# Patient Record
Sex: FEMALE | Race: OTHER | Employment: OTHER | URBAN - METROPOLITAN AREA
[De-identification: names, ages, dates, MRNs, and addresses within clinical notes are randomized per-mention and may not be internally consistent; named-entity substitution may affect disease eponyms.]

---

## 2021-07-21 ENCOUNTER — OFFICE VISIT (OUTPATIENT)
Dept: FAMILY MEDICINE CLINIC | Facility: CLINIC | Age: 51
End: 2021-07-21
Payer: COMMERCIAL

## 2021-07-21 VITALS
WEIGHT: 182.19 LBS | OXYGEN SATURATION: 97 % | DIASTOLIC BLOOD PRESSURE: 82 MMHG | HEART RATE: 76 BPM | HEIGHT: 61 IN | BODY MASS INDEX: 34.4 KG/M2 | SYSTOLIC BLOOD PRESSURE: 118 MMHG

## 2021-07-21 DIAGNOSIS — R73.09 ELEVATED GLUCOSE: ICD-10-CM

## 2021-07-21 DIAGNOSIS — Z11.59 NEED FOR HEPATITIS C SCREENING TEST: ICD-10-CM

## 2021-07-21 DIAGNOSIS — Z12.31 SCREENING MAMMOGRAM, ENCOUNTER FOR: ICD-10-CM

## 2021-07-21 DIAGNOSIS — R63.5 WEIGHT GAIN: ICD-10-CM

## 2021-07-21 DIAGNOSIS — Z78.0 POSTMENOPAUSAL: ICD-10-CM

## 2021-07-21 DIAGNOSIS — Z91.89 AT RISK FOR OSTEOPOROSIS: ICD-10-CM

## 2021-07-21 DIAGNOSIS — Z00.00 PREVENTATIVE HEALTH CARE: Primary | ICD-10-CM

## 2021-07-21 DIAGNOSIS — M54.16 LUMBAR RADICULOPATHY: ICD-10-CM

## 2021-07-21 DIAGNOSIS — E78.5 HYPERLIPIDEMIA, UNSPECIFIED HYPERLIPIDEMIA TYPE: ICD-10-CM

## 2021-07-21 PROCEDURE — 99205 OFFICE O/P NEW HI 60 MIN: CPT | Performed by: INTERNAL MEDICINE

## 2021-07-21 PROCEDURE — 3008F BODY MASS INDEX DOCD: CPT | Performed by: INTERNAL MEDICINE

## 2021-07-21 PROCEDURE — 3079F DIAST BP 80-89 MM HG: CPT | Performed by: INTERNAL MEDICINE

## 2021-07-21 PROCEDURE — 3074F SYST BP LT 130 MM HG: CPT | Performed by: INTERNAL MEDICINE

## 2021-07-21 RX ORDER — OMEPRAZOLE 20 MG/1
20 CAPSULE, DELAYED RELEASE ORAL DAILY
COMMUNITY
End: 2021-08-09

## 2021-07-21 RX ORDER — GEMFIBROZIL 600 MG/1
TABLET, FILM COATED ORAL
COMMUNITY
Start: 2021-03-17 | End: 2021-07-31

## 2021-07-21 RX ORDER — PSEUDOEPHED/ACETAMINOPH/DIPHEN 30MG-500MG
TABLET ORAL
COMMUNITY
Start: 2021-04-14 | End: 2021-09-21 | Stop reason: ALTCHOICE

## 2021-07-21 NOTE — PROGRESS NOTES
[facility] 8  New Patient History and Physical      HPI:   Patient presents with:  Leg Pain: Left leg pain since [address] is a 52year old female presenting for:  Establish care.    Has  has a pa taking: Reported on 7/21/2021)     • ACETAMINOPHEN EXTRA STRENGTH 500 MG Oral Tab DOS TABLETAS 2 VECES AL ALBA PARA DOLOR Y FIBRE (Patient not taking: Reported on 7/21/2021)        PMH:  Past Medical History:   Diagnosis Date   • Esophageal reflux    • High /82 (BP Location: Right arm, Patient Position: Sitting, Cuff Size: adult)   Pulse 76   Ht 5' 1\" (1.549 m)   Wt 182 lb 3.2 oz (82.6 kg)   SpO2 97%   BMI 34.43 kg/m²  Estimated body mass index is 34.43 kg/m² as calculated from the following:    Dalila Thought content normal.         Judgment: Judgment normal.             ASSESSMENT AND PLAN:   Patient is a 52year old female who presents primarily presents for:    (Z00.00) Preventative health care  (primary encounter diagnosis)  Plan: TSH+FREE T4, CBC W the above recommendations and agrees to the above plan. Reasurrance and education provided. All questions answered.   Notified to call with any questions, complications, allergies, or worsening or changing symptoms as well as any side effects or complicati

## 2021-07-22 ENCOUNTER — LAB ENCOUNTER (OUTPATIENT)
Dept: LAB | Facility: HOSPITAL | Age: 51
End: 2021-07-22
Attending: INTERNAL MEDICINE
Payer: COMMERCIAL

## 2021-07-22 DIAGNOSIS — Z91.89 AT RISK FOR OSTEOPOROSIS: ICD-10-CM

## 2021-07-22 DIAGNOSIS — R63.5 WEIGHT GAIN: ICD-10-CM

## 2021-07-22 DIAGNOSIS — Z78.0 POSTMENOPAUSAL: ICD-10-CM

## 2021-07-22 DIAGNOSIS — Z00.00 PREVENTATIVE HEALTH CARE: ICD-10-CM

## 2021-07-22 LAB
ALBUMIN SERPL-MCNC: 3.4 G/DL (ref 3.4–5)
ALBUMIN/GLOB SERPL: 0.9 {RATIO} (ref 1–2)
ALP LIVER SERPL-CCNC: 117 U/L
ALT SERPL-CCNC: 27 U/L
ANION GAP SERPL CALC-SCNC: 7 MMOL/L (ref 0–18)
AST SERPL-CCNC: 16 U/L (ref 15–37)
BASOPHILS # BLD AUTO: 0.05 X10(3) UL (ref 0–0.2)
BASOPHILS NFR BLD AUTO: 0.5 %
BILIRUB SERPL-MCNC: 0.4 MG/DL (ref 0.1–2)
BUN BLD-MCNC: 13 MG/DL (ref 7–18)
BUN/CREAT SERPL: 20.3 (ref 10–20)
CALCIUM BLD-MCNC: 8.4 MG/DL (ref 8.5–10.1)
CHLORIDE SERPL-SCNC: 108 MMOL/L (ref 98–112)
CHOLEST SMN-MCNC: 146 MG/DL (ref ?–200)
CO2 SERPL-SCNC: 26 MMOL/L (ref 21–32)
CREAT BLD-MCNC: 0.64 MG/DL
DEPRECATED RDW RBC AUTO: 39.4 FL (ref 35.1–46.3)
EOSINOPHIL # BLD AUTO: 0.12 X10(3) UL (ref 0–0.7)
EOSINOPHIL NFR BLD AUTO: 1.2 %
ERYTHROCYTE [DISTWIDTH] IN BLOOD BY AUTOMATED COUNT: 12.3 % (ref 11–15)
EST. AVERAGE GLUCOSE BLD GHB EST-MCNC: 105 MG/DL (ref 68–126)
GLOBULIN PLAS-MCNC: 3.8 G/DL (ref 2.8–4.4)
GLUCOSE BLD-MCNC: 83 MG/DL (ref 70–99)
HBA1C MFR BLD HPLC: 5.3 % (ref ?–5.7)
HCT VFR BLD AUTO: 39.8 %
HCV AB SERPL QL IA: NONREACTIVE
HDLC SERPL-MCNC: 34 MG/DL (ref 40–59)
HGB BLD-MCNC: 13.1 G/DL
IMM GRANULOCYTES # BLD AUTO: 0.06 X10(3) UL (ref 0–1)
IMM GRANULOCYTES NFR BLD: 0.6 %
LDLC SERPL CALC-MCNC: 55 MG/DL (ref ?–100)
LYMPHOCYTES # BLD AUTO: 2.38 X10(3) UL (ref 1–4)
LYMPHOCYTES NFR BLD AUTO: 23.4 %
M PROTEIN MFR SERPL ELPH: 7.2 G/DL (ref 6.4–8.2)
MCH RBC QN AUTO: 28.8 PG (ref 26–34)
MCHC RBC AUTO-ENTMCNC: 32.9 G/DL (ref 31–37)
MCV RBC AUTO: 87.5 FL
MONOCYTES # BLD AUTO: 0.57 X10(3) UL (ref 0.1–1)
MONOCYTES NFR BLD AUTO: 5.6 %
NEUTROPHILS # BLD AUTO: 7.01 X10 (3) UL (ref 1.5–7.7)
NEUTROPHILS # BLD AUTO: 7.01 X10(3) UL (ref 1.5–7.7)
NEUTROPHILS NFR BLD AUTO: 68.7 %
NONHDLC SERPL-MCNC: 112 MG/DL (ref ?–130)
OSMOLALITY SERPL CALC.SUM OF ELEC: 291 MOSM/KG (ref 275–295)
PATIENT FASTING Y/N/NP: YES
PATIENT FASTING Y/N/NP: YES
PLATELET # BLD AUTO: 429 10(3)UL (ref 150–450)
POTASSIUM SERPL-SCNC: 3.7 MMOL/L (ref 3.5–5.1)
RBC # BLD AUTO: 4.55 X10(6)UL
SODIUM SERPL-SCNC: 141 MMOL/L (ref 136–145)
T4 FREE SERPL-MCNC: 1 NG/DL (ref 0.8–1.7)
TRIGL SERPL-MCNC: 376 MG/DL (ref 30–149)
TSI SER-ACNC: 1.31 MIU/ML (ref 0.36–3.74)
VLDLC SERPL CALC-MCNC: 55 MG/DL (ref 0–30)
WBC # BLD AUTO: 10.2 X10(3) UL (ref 4–11)

## 2021-07-22 PROCEDURE — 82306 VITAMIN D 25 HYDROXY: CPT

## 2021-07-22 PROCEDURE — 86803 HEPATITIS C AB TEST: CPT | Performed by: INTERNAL MEDICINE

## 2021-07-22 PROCEDURE — 84443 ASSAY THYROID STIM HORMONE: CPT

## 2021-07-22 PROCEDURE — 80053 COMPREHEN METABOLIC PANEL: CPT

## 2021-07-22 PROCEDURE — 80061 LIPID PANEL: CPT | Performed by: INTERNAL MEDICINE

## 2021-07-22 PROCEDURE — 36415 COLL VENOUS BLD VENIPUNCTURE: CPT | Performed by: INTERNAL MEDICINE

## 2021-07-22 PROCEDURE — 83036 HEMOGLOBIN GLYCOSYLATED A1C: CPT | Performed by: INTERNAL MEDICINE

## 2021-07-22 PROCEDURE — 85025 COMPLETE CBC W/AUTO DIFF WBC: CPT | Performed by: INTERNAL MEDICINE

## 2021-07-22 PROCEDURE — 84439 ASSAY OF FREE THYROXINE: CPT

## 2021-07-23 LAB — 25(OH)D3 SERPL-MCNC: 19.9 NG/ML (ref 30–100)

## 2021-08-09 ENCOUNTER — OFFICE VISIT (OUTPATIENT)
Dept: FAMILY MEDICINE CLINIC | Facility: CLINIC | Age: 51
End: 2021-08-09
Payer: COMMERCIAL

## 2021-08-09 VITALS
DIASTOLIC BLOOD PRESSURE: 72 MMHG | BODY MASS INDEX: 34 KG/M2 | SYSTOLIC BLOOD PRESSURE: 118 MMHG | HEART RATE: 79 BPM | TEMPERATURE: 97 F | WEIGHT: 182 LBS | OXYGEN SATURATION: 97 %

## 2021-08-09 DIAGNOSIS — M79.89 LIMB SWELLING: ICD-10-CM

## 2021-08-09 DIAGNOSIS — R51.9 NONINTRACTABLE HEADACHE, UNSPECIFIED CHRONICITY PATTERN, UNSPECIFIED HEADACHE TYPE: Primary | ICD-10-CM

## 2021-08-09 PROCEDURE — 99214 OFFICE O/P EST MOD 30 MIN: CPT | Performed by: INTERNAL MEDICINE

## 2021-08-09 PROCEDURE — 3078F DIAST BP <80 MM HG: CPT | Performed by: INTERNAL MEDICINE

## 2021-08-09 PROCEDURE — 3074F SYST BP LT 130 MM HG: CPT | Performed by: INTERNAL MEDICINE

## 2021-08-09 RX ORDER — CYCLOBENZAPRINE HCL 10 MG
10 TABLET ORAL NIGHTLY PRN
Qty: 90 TABLET | Refills: 0 | Status: SHIPPED | OUTPATIENT
Start: 2021-08-09 | End: 2021-10-01

## 2021-08-09 RX ORDER — PANTOPRAZOLE SODIUM 20 MG/1
20 TABLET, DELAYED RELEASE ORAL
COMMUNITY
End: 2021-08-09

## 2021-08-09 RX ORDER — ROSUVASTATIN CALCIUM 40 MG/1
40 TABLET, COATED ORAL NIGHTLY
Qty: 90 TABLET | Refills: 0 | Status: SHIPPED | OUTPATIENT
Start: 2021-08-09 | End: 2021-11-09

## 2021-08-09 RX ORDER — MELATONIN
1000 DAILY
COMMUNITY

## 2021-08-09 RX ORDER — IBUPROFEN 200 MG
200 TABLET ORAL EVERY 6 HOURS PRN
COMMUNITY
End: 2021-09-21 | Stop reason: ALTCHOICE

## 2021-08-09 RX ORDER — PANTOPRAZOLE SODIUM 20 MG/1
20 TABLET, DELAYED RELEASE ORAL
Qty: 90 TABLET | Refills: 0 | Status: SHIPPED | OUTPATIENT
Start: 2021-08-09 | End: 2021-09-21

## 2021-08-09 RX ORDER — NAPROXEN 500 MG/1
500 TABLET ORAL 2 TIMES DAILY WITH MEALS
Qty: 60 TABLET | Refills: 0 | Status: SHIPPED | OUTPATIENT
Start: 2021-08-09 | End: 2021-10-01

## 2021-08-09 RX ORDER — ATORVASTATIN CALCIUM 10 MG/1
10 TABLET, FILM COATED ORAL NIGHTLY
COMMUNITY
End: 2021-08-09

## 2021-08-10 NOTE — PROGRESS NOTES
[address]  Return Patient      HPI:   Patient presents with:  Headache      August Prim is a 52year old female presenting for:  Follow up  Has  has a past medical history of Esophageal reflux, High cholesterol, and Lumba twitching. She reports that unable to tolerate many medications including NSAIDs and  Pain meds due to nausea and vomiting. Requesting a weight loss medication.       HLD: Was placed on statin and gemfibrozile combination pill while in [facility].    Total Hy every morning before breakfast. 90 tablet 0   • rosuvastatin (CRESTOR) 40 MG Oral Tab Take 1 tablet (40 mg total) by mouth nightly. 90 tablet 0   • cyclobenzaprine 10 MG Oral Tab Take 1 tablet (10 mg total) by mouth nightly as needed.  90 tablet 0   • napro asymmetry, speech difficulty, weakness, light-headedness, numbness and headaches. Hematological: Negative for adenopathy. Does not bruise/bleed easily. Psychiatric/Behavioral: Negative for behavioral problems, sleep disturbance and suicidal ideas.  The General: Skin is warm. Findings: No erythema or rash. Neurological:      Mental Status: She is alert and oriented to person, place, and time. Cranial Nerves: No cranial nerve deficit.       Deep Tendon Reflexes: Reflexes are normal and symmetric any questions, complications, allergies, or worsening or changing symptoms as well as any side effects or complications from the treatments . Red flags/ ER precautions discussed.     Total time spent was 30 minutes which includes: Preparation to see patizaki

## 2021-08-14 ENCOUNTER — HOSPITAL ENCOUNTER (OUTPATIENT)
Dept: MAMMOGRAPHY | Facility: HOSPITAL | Age: 51
Discharge: HOME | End: 2021-08-14
Attending: INTERNAL MEDICINE
Payer: COMMERCIAL

## 2021-08-14 DIAGNOSIS — Z12.31 SCREENING MAMMOGRAM, ENCOUNTER FOR: ICD-10-CM

## 2021-08-14 PROCEDURE — 77067 SCR MAMMO BI INCL CAD: CPT | Performed by: INTERNAL MEDICINE

## 2021-08-14 PROCEDURE — 77063 BREAST TOMOSYNTHESIS BI: CPT | Performed by: INTERNAL MEDICINE

## 2021-08-18 ENCOUNTER — TELEPHONE (OUTPATIENT)
Dept: FAMILY MEDICINE CLINIC | Facility: CLINIC | Age: 51
End: 2021-08-18

## 2021-08-18 NOTE — TELEPHONE ENCOUNTER
Results were discussed with patient and she verbalized understanding.      ----- Message from Yu Monreal MD sent at 8/17/2021 11:24 AM CDT -----  Please ensure that patient knows of mammogram finding. Benign finding. Yearly recommended.

## 2021-09-14 ENCOUNTER — TELEPHONE (OUTPATIENT)
Dept: FAMILY MEDICINE CLINIC | Facility: CLINIC | Age: 51
End: 2021-09-14

## 2021-09-14 NOTE — TELEPHONE ENCOUNTER
Results were discussed with patient ans she was already aware of them because she received the letter from the mammography dept.      ----- Message from Sara Fothergill, MD sent at 9/2/2021  3:54 PM CDT -----  Inform of addendum and benign comparison.   Ro

## 2021-09-21 ENCOUNTER — OFFICE VISIT (OUTPATIENT)
Dept: FAMILY MEDICINE CLINIC | Facility: CLINIC | Age: 51
End: 2021-09-21
Payer: COMMERCIAL

## 2021-09-21 VITALS
DIASTOLIC BLOOD PRESSURE: 70 MMHG | OXYGEN SATURATION: 97 % | WEIGHT: 179 LBS | TEMPERATURE: 98 F | HEART RATE: 76 BPM | SYSTOLIC BLOOD PRESSURE: 104 MMHG | BODY MASS INDEX: 34 KG/M2

## 2021-09-21 DIAGNOSIS — E78.1 HYPERTRIGLYCERIDEMIA: ICD-10-CM

## 2021-09-21 DIAGNOSIS — M25.50 ARTHRALGIA, UNSPECIFIED JOINT: Primary | ICD-10-CM

## 2021-09-21 PROCEDURE — 3078F DIAST BP <80 MM HG: CPT | Performed by: INTERNAL MEDICINE

## 2021-09-21 PROCEDURE — 3074F SYST BP LT 130 MM HG: CPT | Performed by: INTERNAL MEDICINE

## 2021-09-21 PROCEDURE — 99214 OFFICE O/P EST MOD 30 MIN: CPT | Performed by: INTERNAL MEDICINE

## 2021-09-21 RX ORDER — PANTOPRAZOLE SODIUM 20 MG/1
20 TABLET, DELAYED RELEASE ORAL
Qty: 90 TABLET | Refills: 0 | Status: SHIPPED | OUTPATIENT
Start: 2021-09-21 | End: 2021-11-09

## 2021-09-23 ENCOUNTER — LAB ENCOUNTER (OUTPATIENT)
Dept: LAB | Facility: HOSPITAL | Age: 51
End: 2021-09-23
Attending: INTERNAL MEDICINE
Payer: COMMERCIAL

## 2021-09-23 DIAGNOSIS — E78.1 HYPERTRIGLYCERIDEMIA: ICD-10-CM

## 2021-09-23 LAB
CHOLEST SERPL-MCNC: 100 MG/DL (ref ?–200)
HDLC SERPL-MCNC: 35 MG/DL (ref 40–59)
LDLC SERPL CALC-MCNC: 34 MG/DL (ref ?–100)
NONHDLC SERPL-MCNC: 65 MG/DL (ref ?–130)
PATIENT FASTING Y/N/NP: YES
TRIGL SERPL-MCNC: 194 MG/DL (ref 30–149)
VLDLC SERPL CALC-MCNC: 26 MG/DL (ref 0–30)

## 2021-09-23 PROCEDURE — 80061 LIPID PANEL: CPT

## 2021-09-23 PROCEDURE — 36415 COLL VENOUS BLD VENIPUNCTURE: CPT

## 2021-09-24 NOTE — PROGRESS NOTES
[address]  Return Patient      HPI:   Patient presents with:  Joint Pain      Lori Shaffer is a 54year old female presenting for:  Follow up  Has  has a past medical history of Esophageal reflux, High cholesterol, and Lum muscle twitching. She reports that unable to tolerate many medications including NSAIDs and  Pain meds due to nausea and vomiting. Requesting a weight loss medication.       HLD: Was placed on statin and gemfibrozile combination pill while in [facility].    T Take 1 tablet (10 mg total) by mouth nightly as needed.  (Patient not taking: Reported on 9/21/2021) 90 tablet 0      PMH:  Past Medical History:   Diagnosis Date   • Esophageal reflux    • High cholesterol    • Lumbar spondylosis          PSH:  Past Surgic PHYSICAL EXAM:   /70 (BP Location: Right arm, Patient Position: Sitting, Cuff Size: adult)   Pulse 76   Temp 97.5 °F (36.4 °C) (Temporal)   Wt 179 lb (81.2 kg)   SpO2 97%   BMI 33.82 kg/m²  Estimated body mass index is 33.82 kg/m² as calculated fro Psychiatric:         Behavior: Behavior normal.         Thought Content:  Thought content normal.         Judgment: Judgment normal.             ASSESSMENT AND PLAN:   Patient is a 54year old female who presents primarily presents for:    (M25.50) Pretty [place]

## 2021-10-01 ENCOUNTER — HOSPITAL ENCOUNTER (OUTPATIENT)
Dept: GENERAL RADIOLOGY | Facility: HOSPITAL | Age: 51
Discharge: HOME | End: 2021-10-01
Attending: PHYSICAL MEDICINE & REHABILITATION
Payer: COMMERCIAL

## 2021-10-01 ENCOUNTER — OFFICE VISIT (OUTPATIENT)
Dept: PHYSICAL MEDICINE AND REHAB | Facility: CLINIC | Age: 51
End: 2021-10-01
Payer: COMMERCIAL

## 2021-10-01 VITALS
SYSTOLIC BLOOD PRESSURE: 116 MMHG | BODY MASS INDEX: 31.53 KG/M2 | OXYGEN SATURATION: 98 % | HEIGHT: 61 IN | WEIGHT: 167 LBS | DIASTOLIC BLOOD PRESSURE: 84 MMHG | HEART RATE: 98 BPM

## 2021-10-01 DIAGNOSIS — R20.0 NUMBNESS IN BOTH HANDS: ICD-10-CM

## 2021-10-01 DIAGNOSIS — M25.522 BILATERAL ELBOW JOINT PAIN: ICD-10-CM

## 2021-10-01 DIAGNOSIS — K29.60 NSAID INDUCED GASTRITIS: ICD-10-CM

## 2021-10-01 DIAGNOSIS — T39.395A NSAID INDUCED GASTRITIS: ICD-10-CM

## 2021-10-01 DIAGNOSIS — G56.03 BILATERAL CARPAL TUNNEL SYNDROME: ICD-10-CM

## 2021-10-01 DIAGNOSIS — M77.00 MEDIAL EPICONDYLITIS OF ELBOW, UNSPECIFIED LATERALITY: ICD-10-CM

## 2021-10-01 DIAGNOSIS — M25.521 BILATERAL ELBOW JOINT PAIN: ICD-10-CM

## 2021-10-01 DIAGNOSIS — E78.5 HYPERLIPIDEMIA, UNSPECIFIED HYPERLIPIDEMIA TYPE: Primary | ICD-10-CM

## 2021-10-01 DIAGNOSIS — E78.5 HYPERLIPIDEMIA, UNSPECIFIED HYPERLIPIDEMIA TYPE: ICD-10-CM

## 2021-10-01 PROCEDURE — 73080 X-RAY EXAM OF ELBOW: CPT | Performed by: PHYSICAL MEDICINE & REHABILITATION

## 2021-10-01 PROCEDURE — 3074F SYST BP LT 130 MM HG: CPT | Performed by: PHYSICAL MEDICINE & REHABILITATION

## 2021-10-01 PROCEDURE — 3008F BODY MASS INDEX DOCD: CPT | Performed by: PHYSICAL MEDICINE & REHABILITATION

## 2021-10-01 PROCEDURE — 99244 OFF/OP CNSLTJ NEW/EST MOD 40: CPT | Performed by: PHYSICAL MEDICINE & REHABILITATION

## 2021-10-01 PROCEDURE — 3079F DIAST BP 80-89 MM HG: CPT | Performed by: PHYSICAL MEDICINE & REHABILITATION

## 2021-10-01 RX ORDER — DICLOFENAC SODIUM 75 MG/1
75 TABLET, DELAYED RELEASE ORAL 2 TIMES DAILY
Qty: 60 TABLET | Refills: 1 | Status: SHIPPED | OUTPATIENT
Start: 2021-10-01 | End: 2021-10-11

## 2021-10-01 NOTE — H&P
[address] H&P    Requesting Physician: Richar Metcalf MD    Chief Complaint (Reason for Visit):  Patient presents with:  Elbow Pain: New right handed patient comes in for bilateral elbow pain eligio History      Not on file    Tobacco Use      Smoking status: Never Smoker      Smokeless tobacco: Never Used    Substance and Sexual Activity      Alcohol use: Not Currently      Drug use: Not Currently      Sexual activity: Not on file      CURRENT MEDICA spontaneous speech intact  Motor:    Musculoskeletal:    ELBOW:  Inspection: no erythema, swelling, or obvious deformity. No muscle atrophy or asymmetrical hypertrophy. Distal biceps tendon intact. No bursitis visualized.    Palpation: Tender to palpation Calculated Osmolality 07/22/2021 291  275 - 295 mOsm/kg Final   • GFR, Non- 07/22/2021 106  >=60 Final   • GFR, -American 07/22/2021 122  >=60 Final   • ALT 07/22/2021 27  13 - 56 U/L Final   • AST 07/22/2021 16  15 - 37 U/L Final 4.00 x10(3) uL Final   • Monocyte Absolute 07/22/2021 0.57  0.10 - 1.00 x10(3) uL Final   • Eosinophil Absolute 07/22/2021 0.12  0.00 - 0.70 x10(3) uL Final   • Basophil Absolute 07/22/2021 0.05  0.00 - 0.20 x10(3) uL Final   • Immature Granulocyte Absolut questions were answered. There were no barriers to learning.        Hyperlipidemia, unspecified hyperlipidemia type  (primary encounter diagnosis)  NSAID induced gastritis  Medial epicondylitis of elbow, unspecified laterality  Bilateral elbow joint pain

## 2021-10-01 NOTE — PATIENT INSTRUCTIONS
1) Start Occupational therapy as soon as possible at [address] in [facility]  2) Purchase resting wrist splints (carpal tunnel splints) on [employer], [facility], [employer], etc and wear these all night while sleeping.  You can also purchase a

## 2021-10-11 ENCOUNTER — OFFICE VISIT (OUTPATIENT)
Dept: PHYSICAL MEDICINE AND REHAB | Facility: CLINIC | Age: 51
End: 2021-10-11
Payer: COMMERCIAL

## 2021-10-11 ENCOUNTER — TELEPHONE (OUTPATIENT)
Dept: NEUROLOGY | Facility: CLINIC | Age: 51
End: 2021-10-11

## 2021-10-11 VITALS
OXYGEN SATURATION: 99 % | SYSTOLIC BLOOD PRESSURE: 114 MMHG | HEART RATE: 77 BPM | WEIGHT: 167 LBS | BODY MASS INDEX: 31.53 KG/M2 | HEIGHT: 61 IN | DIASTOLIC BLOOD PRESSURE: 76 MMHG

## 2021-10-11 DIAGNOSIS — T39.395A NSAID INDUCED GASTRITIS: ICD-10-CM

## 2021-10-11 DIAGNOSIS — M25.521 BILATERAL ELBOW JOINT PAIN: ICD-10-CM

## 2021-10-11 DIAGNOSIS — R20.0 NUMBNESS IN BOTH HANDS: ICD-10-CM

## 2021-10-11 DIAGNOSIS — E78.5 HYPERLIPIDEMIA, UNSPECIFIED HYPERLIPIDEMIA TYPE: ICD-10-CM

## 2021-10-11 DIAGNOSIS — M25.522 BILATERAL ELBOW JOINT PAIN: ICD-10-CM

## 2021-10-11 DIAGNOSIS — M77.00 MEDIAL EPICONDYLITIS OF ELBOW, UNSPECIFIED LATERALITY: Primary | ICD-10-CM

## 2021-10-11 DIAGNOSIS — K29.60 NSAID INDUCED GASTRITIS: ICD-10-CM

## 2021-10-11 DIAGNOSIS — G56.03 BILATERAL CARPAL TUNNEL SYNDROME: ICD-10-CM

## 2021-10-11 PROCEDURE — 3074F SYST BP LT 130 MM HG: CPT | Performed by: PHYSICAL MEDICINE & REHABILITATION

## 2021-10-11 PROCEDURE — 99214 OFFICE O/P EST MOD 30 MIN: CPT | Performed by: PHYSICAL MEDICINE & REHABILITATION

## 2021-10-11 PROCEDURE — 20551 NJX 1 TENDON ORIGIN/INSJ: CPT | Performed by: PHYSICAL MEDICINE & REHABILITATION

## 2021-10-11 PROCEDURE — 3078F DIAST BP <80 MM HG: CPT | Performed by: PHYSICAL MEDICINE & REHABILITATION

## 2021-10-11 PROCEDURE — 76942 ECHO GUIDE FOR BIOPSY: CPT | Performed by: PHYSICAL MEDICINE & REHABILITATION

## 2021-10-11 PROCEDURE — 3008F BODY MASS INDEX DOCD: CPT | Performed by: PHYSICAL MEDICINE & REHABILITATION

## 2021-10-11 RX ORDER — TRIAMCINOLONE ACETONIDE 40 MG/ML
40 INJECTION, SUSPENSION INTRA-ARTICULAR; INTRAMUSCULAR ONCE
Status: COMPLETED | OUTPATIENT
Start: 2021-10-11 | End: 2021-10-11

## 2021-10-11 RX ORDER — NAPROXEN 500 MG/1
500 TABLET ORAL 2 TIMES DAILY WITH MEALS
Qty: 60 TABLET | Refills: 0 | Status: SHIPPED | OUTPATIENT
Start: 2021-10-11 | End: 2021-11-15

## 2021-10-11 RX ORDER — LIDOCAINE HYDROCHLORIDE 10 MG/ML
5 INJECTION, SOLUTION INFILTRATION; PERINEURAL ONCE
Status: COMPLETED | OUTPATIENT
Start: 2021-10-11 | End: 2021-10-11

## 2021-10-11 NOTE — TELEPHONE ENCOUNTER
[employer] for authorization of approval for LEFT medial epicondyle CSI under ultrasound guidance cpt codes 55746, [id number], [id number]. Authorization is not required. Transaction ID: [id number]  Will  inform Nursing.

## 2021-10-11 NOTE — PROGRESS NOTES
[address]  Progress Note    CHIEF COMPLAINT:  Patient presents with:  Elbow Pain: LOV 10/01/21. Patient is here because she states the diclofenac is not helping her.  She Purchased the wrist aplints and the needed. 60 tablet 0   • pantoprazole 20 MG Oral Tab EC Take 1 tablet (20 mg total) by mouth every morning before breakfast. 90 tablet 0   • Vitamin D3, Cholecalciferol, 25 MCG (1000 UT) Oral Tab Take 1,000 Units by mouth daily.      • rosuvastatin (CRESTOR) extremities  Sensation: Intact to light touch in all dermatomes of the upper extremities  Reflexes: 2/4 at C5, C6, C7  Tinels at the medial epicondyle and wrists: Negative  Valgus Stress: Normal  Milking for UCL sprain: Normal  Valgus and Extension: Normal g/dL Final   • Globulin  07/22/2021 3.8  2.8 - 4.4 g/dL Final   • A/G Ratio 07/22/2021 0.9* 1.0 - 2.0 Final   • FASTING 07/22/2021 Yes   Final   • Vitamin D, 25OH, Total 07/22/2021 19.9* 30.0 - 100.0 ng/mL Final   Office Visit on 07/21/2021   Component Fahad • Basophil % 07/22/2021 0.5  % Final   • Immature Granulocyte % 07/22/2021 0.6  % Final   ]      Radiology Imaging:  I reviewed with the patient her X-ray of the elbows bilateral from 10/1/21  XR ELBOW, COMPLETE (MIN 3 VIEWS), LEFT (CPT=73080)  Narrative occupational therapy and have also given her an order to start this at the [facility] as well as Drs. Of physical therapy. She will follow up with me in about 6 to 8 weeks.   At that point if her numbness and tingling her hands does not imp

## 2021-10-11 NOTE — PROCEDURES
[address]   Lateral Epicondyle Injection Procedure Note    CHIEF COMPLAINT:  Patient presents with:  Elbow Pain: LOV 10/01/21. Patient is here because she states the diclofenac is not helping her.  She Purch Total 07/22/2021 8.4* 8.5 - 10.1 mg/dL Final   • Calculated Osmolality 07/22/2021 291  275 - 295 mOsm/kg Final   • GFR, Non- 07/22/2021 106  >=60 Final   • GFR, -American 07/22/2021 122  >=60 Final   • ALT 07/22/2021 27  13 - 56 U/L • Lymphocyte Absolute 07/22/2021 2.38  1.00 - 4.00 x10(3) uL Final   • Monocyte Absolute 07/22/2021 0.57  0.10 - 1.00 x10(3) uL Final   • Eosinophil Absolute 07/22/2021 0.12  0.00 - 0.70 x10(3) uL Final   • Basophil Absolute 07/22/2021 0.05  0.00 - 0.20 pictures were saved in our PACS systeme. INSTRUCTIONS GIVEN TO PATIENT:    \"You will see an effect in the next 2-3 days.   Please contact me if you have fevers, worsening swelling, worsening pain, decreased range of motion, increased redness, chills,

## 2021-11-09 ENCOUNTER — OFFICE VISIT (OUTPATIENT)
Dept: FAMILY MEDICINE CLINIC | Facility: CLINIC | Age: 51
End: 2021-11-09
Payer: COMMERCIAL

## 2021-11-09 VITALS
HEIGHT: 61 IN | TEMPERATURE: 98 F | SYSTOLIC BLOOD PRESSURE: 98 MMHG | HEART RATE: 65 BPM | BODY MASS INDEX: 32.1 KG/M2 | WEIGHT: 170 LBS | DIASTOLIC BLOOD PRESSURE: 70 MMHG | OXYGEN SATURATION: 99 %

## 2021-11-09 DIAGNOSIS — Z00.00 ANNUAL PHYSICAL EXAM: Primary | ICD-10-CM

## 2021-11-09 DIAGNOSIS — Z12.11 ENCOUNTER FOR SCREENING COLONOSCOPY: ICD-10-CM

## 2021-11-09 PROCEDURE — 3074F SYST BP LT 130 MM HG: CPT | Performed by: INTERNAL MEDICINE

## 2021-11-09 PROCEDURE — 99396 PREV VISIT EST AGE 40-64: CPT | Performed by: INTERNAL MEDICINE

## 2021-11-09 PROCEDURE — 3078F DIAST BP <80 MM HG: CPT | Performed by: INTERNAL MEDICINE

## 2021-11-09 PROCEDURE — 3008F BODY MASS INDEX DOCD: CPT | Performed by: INTERNAL MEDICINE

## 2021-11-09 PROCEDURE — 90471 IMMUNIZATION ADMIN: CPT | Performed by: INTERNAL MEDICINE

## 2021-11-09 PROCEDURE — 90686 IIV4 VACC NO PRSV 0.5 ML IM: CPT | Performed by: INTERNAL MEDICINE

## 2021-11-09 RX ORDER — ROSUVASTATIN CALCIUM 40 MG/1
40 TABLET, COATED ORAL NIGHTLY
Qty: 90 TABLET | Refills: 0 | Status: SHIPPED | OUTPATIENT
Start: 2021-11-09 | End: 2021-11-09

## 2021-11-09 RX ORDER — PANTOPRAZOLE SODIUM 20 MG/1
20 TABLET, DELAYED RELEASE ORAL
Qty: 120 TABLET | Refills: 0 | Status: SHIPPED | OUTPATIENT
Start: 2021-11-09

## 2021-11-09 RX ORDER — GLUCOSAMINE SULFATE DIPOT CHLR 1000 MG
1 TABLET ORAL DAILY
Qty: 120 TABLET | Refills: 0 | Status: SHIPPED | OUTPATIENT
Start: 2021-11-09

## 2021-11-09 RX ORDER — GLUCOSAMINE SULFATE DIPOT CHLR 1000 MG
1 TABLET ORAL DAILY
Qty: 90 TABLET | Refills: 0 | Status: SHIPPED | OUTPATIENT
Start: 2021-11-09 | End: 2021-11-09

## 2021-11-09 RX ORDER — PANTOPRAZOLE SODIUM 20 MG/1
20 TABLET, DELAYED RELEASE ORAL
Qty: 90 TABLET | Refills: 0 | Status: SHIPPED | OUTPATIENT
Start: 2021-11-09 | End: 2021-11-09

## 2021-11-09 RX ORDER — ROSUVASTATIN CALCIUM 40 MG/1
40 TABLET, COATED ORAL NIGHTLY
Qty: 120 TABLET | Refills: 0 | Status: SHIPPED | OUTPATIENT
Start: 2021-11-09

## 2021-11-09 NOTE — PROGRESS NOTES
[address]  Return Patient      HPI:   Patient presents with:  Physical      Dax Escalante is a 54year old female presenting for:  Annual.  Has  has a past medical history of Esophageal reflux, High cholesterol, and Lumbar naproxen (NAPROSYN) 500 MG Oral Tab Take 1 tablet (500 mg total) by mouth 2 (two) times daily with meals. Take for 2 weeks as directed and then as needed.  60 tablet 0   • pantoprazole 20 MG Oral Tab EC Take 1 tablet (20 mg total) by mouth every morning bef Neurological: Negative for dizziness, seizures, facial asymmetry, speech difficulty, weakness, light-headedness, numbness and headaches. Hematological: Negative for adenopathy. Does not bruise/bleed easily.    Psychiatric/Behavioral: Negative for behavi Cervical: No cervical adenopathy. Skin:     General: Skin is warm. Findings: No erythema or rash. Neurological:      Mental Status: She is alert and oriented to person, place, and time. Cranial Nerves: No cranial nerve deficit.       Deep Tend chart review, reviewing appropriate medical history, counseling and education (diet and exercise), discussing treatment options, ordering appropriate diagnostic tests and documentation.     Mayi Howell MD  Internal Medicine/Primary Care  [place] [id number]

## 2021-11-10 ENCOUNTER — MED REC SCAN ONLY (OUTPATIENT)
Dept: PHYSICAL MEDICINE AND REHAB | Facility: CLINIC | Age: 51
End: 2021-11-10

## 2021-11-15 ENCOUNTER — MED REC SCAN ONLY (OUTPATIENT)
Dept: PHYSICAL MEDICINE AND REHAB | Facility: CLINIC | Age: 51
End: 2021-11-15

## 2021-11-15 ENCOUNTER — OFFICE VISIT (OUTPATIENT)
Dept: PHYSICAL MEDICINE AND REHAB | Facility: CLINIC | Age: 51
End: 2021-11-15
Payer: COMMERCIAL

## 2021-11-15 VITALS
BODY MASS INDEX: 32.1 KG/M2 | HEART RATE: 75 BPM | OXYGEN SATURATION: 99 % | HEIGHT: 61 IN | DIASTOLIC BLOOD PRESSURE: 82 MMHG | SYSTOLIC BLOOD PRESSURE: 104 MMHG | WEIGHT: 170 LBS

## 2021-11-15 DIAGNOSIS — K29.60 NSAID INDUCED GASTRITIS: ICD-10-CM

## 2021-11-15 DIAGNOSIS — R20.0 NUMBNESS IN BOTH HANDS: ICD-10-CM

## 2021-11-15 DIAGNOSIS — M47.816 LUMBAR SPONDYLOSIS: ICD-10-CM

## 2021-11-15 DIAGNOSIS — M77.00 MEDIAL EPICONDYLITIS OF ELBOW, UNSPECIFIED LATERALITY: Primary | ICD-10-CM

## 2021-11-15 DIAGNOSIS — M54.59 LUMBAR TRIGGER POINT SYNDROME: ICD-10-CM

## 2021-11-15 DIAGNOSIS — M48.061 LUMBAR FORAMINAL STENOSIS: ICD-10-CM

## 2021-11-15 DIAGNOSIS — M51.26 BULGE OF LUMBAR DISC WITHOUT MYELOPATHY: ICD-10-CM

## 2021-11-15 DIAGNOSIS — G56.03 BILATERAL CARPAL TUNNEL SYNDROME: ICD-10-CM

## 2021-11-15 DIAGNOSIS — M25.522 BILATERAL ELBOW JOINT PAIN: ICD-10-CM

## 2021-11-15 DIAGNOSIS — M51.37 DDD (DEGENERATIVE DISC DISEASE), LUMBOSACRAL: ICD-10-CM

## 2021-11-15 DIAGNOSIS — M25.521 BILATERAL ELBOW JOINT PAIN: ICD-10-CM

## 2021-11-15 DIAGNOSIS — T39.395A NSAID INDUCED GASTRITIS: ICD-10-CM

## 2021-11-15 DIAGNOSIS — E78.5 HYPERLIPIDEMIA, UNSPECIFIED HYPERLIPIDEMIA TYPE: ICD-10-CM

## 2021-11-15 DIAGNOSIS — M47.816 FACET SYNDROME, LUMBAR: ICD-10-CM

## 2021-11-15 DIAGNOSIS — M51.16 LUMBAR DISC HERNIATION WITH RADICULOPATHY: ICD-10-CM

## 2021-11-15 DIAGNOSIS — M54.59 MECHANICAL LOW BACK PAIN: ICD-10-CM

## 2021-11-15 PROBLEM — M51.379 DDD (DEGENERATIVE DISC DISEASE), LUMBOSACRAL: Status: ACTIVE | Noted: 2021-11-15

## 2021-11-15 PROBLEM — M51.36 BULGE OF LUMBAR DISC WITHOUT MYELOPATHY: Status: ACTIVE | Noted: 2021-11-15

## 2021-11-15 PROBLEM — M51.369 BULGE OF LUMBAR DISC WITHOUT MYELOPATHY: Status: ACTIVE | Noted: 2021-11-15

## 2021-11-15 PROCEDURE — 99215 OFFICE O/P EST HI 40 MIN: CPT | Performed by: PHYSICAL MEDICINE & REHABILITATION

## 2021-11-15 PROCEDURE — 3079F DIAST BP 80-89 MM HG: CPT | Performed by: PHYSICAL MEDICINE & REHABILITATION

## 2021-11-15 PROCEDURE — 3074F SYST BP LT 130 MM HG: CPT | Performed by: PHYSICAL MEDICINE & REHABILITATION

## 2021-11-15 PROCEDURE — 3008F BODY MASS INDEX DOCD: CPT | Performed by: PHYSICAL MEDICINE & REHABILITATION

## 2021-11-15 RX ORDER — NAPROXEN 500 MG/1
500 TABLET ORAL 2 TIMES DAILY WITH MEALS
Qty: 60 TABLET | Refills: 0 | Status: SHIPPED | OUTPATIENT
Start: 2021-11-15 | End: 2021-11-22

## 2021-11-15 NOTE — PATIENT INSTRUCTIONS
1) Please get records of the injections performed in the low back  2) Consider a LEFt L5 and LEFT S1 TFESI depending on what the records state of how many epidurals you have had this year and how much cortisone yo have had this year  3) If the TFESI are no

## 2021-11-15 NOTE — PROGRESS NOTES
[address]  Progress Note    CHIEF COMPLAINT:  Patient presents with:  Elbow Pain: 10/11/21 LOV and  LEFT medial epicondyle CSI. States 90% improvement after injection.  She still has some soreness in left el SURGICAL HISTORY:  Past Surgical History:   Procedure Laterality Date   • HYSTERECTOMY  2017       SOCIAL HISTORY:   Social History    Occupational History      Not on file    Tobacco Use      Smoking status: Never Smoker      Smokeless tobacco: Lilly Burgess deformities  Mouth: No lesions or ulcerations  Heart: peripheral pulses intact. Normal capillary refill. Lungs: Non-labored respirations  Abdomen: No abdominal guarding  Extremities: No lower extremity edema bilaterally   Skin: No lesions noted.    Cognit 07/22/2021 141  136 - 145 mmol/L Final   • Potassium 07/22/2021 3.7  3.5 - 5.1 mmol/L Final   • Chloride 07/22/2021 108  98 - 112 mmol/L Final   • CO2 07/22/2021 26.0  21.0 - 32.0 mmol/L Final   • Anion Gap 07/22/2021 7  0 - 18 mmol/L Final   • BUN 07/22/2 100.0 fL Final   • MCH 07/22/2021 28.8  26.0 - 34.0 pg Final   • MCHC 07/22/2021 32.9  31.0 - 37.0 g/dL Final   • RDW-SD 07/22/2021 39.4  35.1 - 46.3 fL Final   • RDW 07/22/2021 12.3  11.0 - 15.0 % Final   • PLT 07/22/2021 429.0  150.0 - 450.0 10(3)Yao Sears in her symptoms and only mild tenderness over the injection site. She has done very well and is very satisfied with this.   She is also coming in with complaints of low back pain with radiation down both legs and cramping in the medial gastrocs associated syndrome  DDD (degenerative disc disease), lumbosacral  Lumbar spondylosis  Lumbar foraminal stenosis  Bulge of lumbar disc without myelopathy  Lumbar disc herniation with radiculopathy  Facet syndrome, lumbar  Mechanical low back pain  Lumbar trigger poin

## 2021-11-22 ENCOUNTER — OFFICE VISIT (OUTPATIENT)
Dept: PHYSICAL MEDICINE AND REHAB | Facility: CLINIC | Age: 51
End: 2021-11-22
Payer: COMMERCIAL

## 2021-11-22 ENCOUNTER — TELEPHONE (OUTPATIENT)
Dept: PHYSICAL MEDICINE AND REHAB | Facility: CLINIC | Age: 51
End: 2021-11-22

## 2021-11-22 VITALS
DIASTOLIC BLOOD PRESSURE: 84 MMHG | HEART RATE: 71 BPM | BODY MASS INDEX: 32.1 KG/M2 | OXYGEN SATURATION: 98 % | HEIGHT: 61 IN | SYSTOLIC BLOOD PRESSURE: 120 MMHG | WEIGHT: 170 LBS

## 2021-11-22 DIAGNOSIS — M99.9 BIOMECHANICAL LESION: ICD-10-CM

## 2021-11-22 DIAGNOSIS — M54.59 MECHANICAL LOW BACK PAIN: ICD-10-CM

## 2021-11-22 DIAGNOSIS — M25.512 CHRONIC LEFT SHOULDER PAIN: ICD-10-CM

## 2021-11-22 DIAGNOSIS — M51.16 LUMBAR DISC HERNIATION WITH RADICULOPATHY: ICD-10-CM

## 2021-11-22 DIAGNOSIS — G89.29 CHRONIC LEFT SHOULDER PAIN: ICD-10-CM

## 2021-11-22 DIAGNOSIS — M47.816 LUMBAR SPONDYLOSIS: ICD-10-CM

## 2021-11-22 DIAGNOSIS — M54.59 LUMBAR TRIGGER POINT SYNDROME: ICD-10-CM

## 2021-11-22 DIAGNOSIS — M67.919 TENDINOPATHY OF ROTATOR CUFF, UNSPECIFIED LATERALITY: ICD-10-CM

## 2021-11-22 DIAGNOSIS — M47.816 FACET SYNDROME, LUMBAR: ICD-10-CM

## 2021-11-22 DIAGNOSIS — M54.2 TRIGGER POINT OF NECK: ICD-10-CM

## 2021-11-22 DIAGNOSIS — M48.061 LUMBAR FORAMINAL STENOSIS: ICD-10-CM

## 2021-11-22 DIAGNOSIS — M79.10 MYALGIA: Primary | ICD-10-CM

## 2021-11-22 DIAGNOSIS — M51.26 BULGE OF LUMBAR DISC WITHOUT MYELOPATHY: ICD-10-CM

## 2021-11-22 DIAGNOSIS — M51.37 DDD (DEGENERATIVE DISC DISEASE), LUMBOSACRAL: ICD-10-CM

## 2021-11-22 DIAGNOSIS — G25.89 SCAPULAR DYSKINESIS: ICD-10-CM

## 2021-11-22 PROCEDURE — 3074F SYST BP LT 130 MM HG: CPT | Performed by: PHYSICAL MEDICINE & REHABILITATION

## 2021-11-22 PROCEDURE — 3008F BODY MASS INDEX DOCD: CPT | Performed by: PHYSICAL MEDICINE & REHABILITATION

## 2021-11-22 PROCEDURE — 99214 OFFICE O/P EST MOD 30 MIN: CPT | Performed by: PHYSICAL MEDICINE & REHABILITATION

## 2021-11-22 PROCEDURE — 3079F DIAST BP 80-89 MM HG: CPT | Performed by: PHYSICAL MEDICINE & REHABILITATION

## 2021-11-22 RX ORDER — SERTRALINE HYDROCHLORIDE 100 MG/1
100 TABLET, FILM COATED ORAL DAILY
COMMUNITY

## 2021-11-22 NOTE — TELEPHONE ENCOUNTER
Initiated authorization for LEFT L5 and LEFT S1 TFESIs CPT 76069+22925 dx:M51.16 to be done at [facility] with AIM online    Status: Approved-with order #[id number] valid 11/24/21-12/23/21

## 2021-11-22 NOTE — PATIENT INSTRUCTIONS
1) My office will call you to schedule the LEFT L5 and LEFT S1 TFESI under local anesthesia once the procedure is approved by your insurance carrier.     2) Follow up with me 2 weeks after the procedure  3) We will contact Dr. Lisa Law ([phone])

## 2021-11-23 NOTE — PROGRESS NOTES
[address]  Progress Note    CHIEF COMPLAINT:  Patient presents with: Follow - Up: Patient is here to f/u. Still waiting on injections list. No new or worsening symptoms.  Reports throbbing and spams in bila nightly. 120 tablet 0   • pantoprazole 20 MG Oral Tab EC Take 1 tablet (20 mg total) by mouth every morning before breakfast. 120 tablet 0   • B Complex-Biotin-FA (COMPLEX B-100) Oral Tab CR Take 1 tablet by mouth daily.  120 tablet 0   • Vitamin D3, Cholec motion of the lumbar spine with pain during extension  Motor: 5/5 in all myotomes of the BILATERAL lower extremities except 4/5 with BILATERAL GTE (L5)  Sensation: Intact to light touch in all dermatomes of the lower extremities   Reflexes: 2/4 at L4 and 1 • Total Protein 07/22/2021 7.2  6.4 - 8.2 g/dL Final   • Albumin 07/22/2021 3.4  3.4 - 5.0 g/dL Final   • Globulin  07/22/2021 3.8  2.8 - 4.4 g/dL Final   • A/G Ratio 07/22/2021 0.9* 1.0 - 2.0 Final   • FASTING 07/22/2021 Yes   Final   • Vitamin D, 25OH, 23.4  % Final   • Monocyte % 07/22/2021 5.6  % Final   • Eosinophil % 07/22/2021 1.2  % Final   • Basophil % 07/22/2021 0.5  % Final   • Immature Granulocyte % 07/22/2021 0.6  % Final   ]      Radiology Imaging:  I reviewed with the patient her MRI of the laterality  Mechanical low back pain  Lumbar foraminal stenosis  Lumbar spondylosis  Lumbar trigger point syndrome  Facet syndrome, lumbar  DDD (degenerative disc disease), lumbosacral  Bulge of lumbar disc without myelopathy  Lumbar disc herniation with r

## 2021-11-24 NOTE — TELEPHONE ENCOUNTER
Patient has been scheduled for EFT L5 and LEFT S1 transforaminal epidural steroid injections on 12/15/21 at the [facility] with Dr. Gama Watkins.   -Anesthesia type: Local.  -If receiving MAC or IVC sedation patient will need to get COVID tested 3 days prior even if al

## 2021-12-09 NOTE — TELEPHONE ENCOUNTER
Chandni Palumbo spoke with PAT, would like to be sedated. Informed Dr. Rc Morales and adjusted on Casetabs. Will also call patient for protocol on this sedation. 1. Fasting midnight of procedure. 2. Covid testing 3 days prior   3.  needed.

## 2021-12-12 ENCOUNTER — APPOINTMENT (OUTPATIENT)
Dept: LAB | Facility: HOSPITAL | Age: 51
End: 2021-12-12
Attending: PHYSICAL MEDICINE & REHABILITATION
Payer: COMMERCIAL

## 2021-12-12 ENCOUNTER — LAB REQUISITION (OUTPATIENT)
Dept: SURGERY | Age: 51
End: 2021-12-12
Payer: COMMERCIAL

## 2021-12-12 DIAGNOSIS — Z01.818 PREOP EXAMINATION: ICD-10-CM

## 2021-12-15 ENCOUNTER — OFFICE VISIT (OUTPATIENT)
Dept: SURGERY | Facility: CLINIC | Age: 51
End: 2021-12-15

## 2021-12-15 DIAGNOSIS — M54.59 LUMBAR TRIGGER POINT SYNDROME: ICD-10-CM

## 2021-12-15 DIAGNOSIS — M51.37 DDD (DEGENERATIVE DISC DISEASE), LUMBOSACRAL: ICD-10-CM

## 2021-12-15 DIAGNOSIS — M51.16 LUMBAR DISC HERNIATION WITH RADICULOPATHY: ICD-10-CM

## 2021-12-15 DIAGNOSIS — M54.59 MECHANICAL LOW BACK PAIN: Primary | ICD-10-CM

## 2021-12-15 DIAGNOSIS — M47.816 LUMBAR SPONDYLOSIS: ICD-10-CM

## 2021-12-15 DIAGNOSIS — M51.26 BULGE OF LUMBAR DISC WITHOUT MYELOPATHY: ICD-10-CM

## 2021-12-15 DIAGNOSIS — M47.816 FACET SYNDROME, LUMBAR: ICD-10-CM

## 2021-12-15 DIAGNOSIS — M48.061 LUMBAR FORAMINAL STENOSIS: ICD-10-CM

## 2021-12-15 PROCEDURE — 64483 NJX AA&/STRD TFRM EPI L/S 1: CPT | Performed by: PHYSICAL MEDICINE & REHABILITATION

## 2021-12-15 PROCEDURE — 64484 NJX AA&/STRD TFRM EPI L/S EA: CPT | Performed by: PHYSICAL MEDICINE & REHABILITATION

## 2021-12-15 NOTE — PROCEDURES
[address]    2-LEVEL LUMBAR TRANSFORAMINAL   NAME:  Alexandra Hanson    MR #:    [id number] :  1970     PHYSICIAN:  Shayy Klein MD        Operative Report    DATE OF PROCEDURE: 12/15/2021   PREOPERATIVE DIAGNOSES: 1. time, Omnipaque-240 contrast was used to obtain a good epidurogram indicating correct needle placement at each level. Then, aspiration was performed. No blood, fluid, or air was aspirated.   Then, the patient was injected with a 4 cc solution of 2 cc of 6

## 2022-01-12 ENCOUNTER — TELEPHONE (OUTPATIENT)
Dept: PHYSICAL MEDICINE AND REHAB | Facility: CLINIC | Age: 52
End: 2022-01-12

## 2022-01-12 DIAGNOSIS — M54.59 MECHANICAL LOW BACK PAIN: Primary | ICD-10-CM

## 2022-01-12 NOTE — TELEPHONE ENCOUNTER
No current order for Left L5-SI TFESI.      Previous authorization obtain for similar exam on 11/22/21 with valid date of 11/24/21 thru 12/23/21    TFESI Procedure completed on 12/15/21

## 2022-01-12 NOTE — TELEPHONE ENCOUNTER
Pt had a video appt w/ Dr. Radha Sandoavl on 12/31/2021. Dr. Radha Sandoval had order a LEFT l5 and LEFT S1 TFESI under local anesthesia\".    [facility]          FYI: do we need a order or a prior authorization first .  Please assist

## 2022-01-12 NOTE — TELEPHONE ENCOUNTER
Patient denied video/follow up appointment and insisted that Dr. Annabella Russo would be giving  an injection. LOV: 12/15/21 [facility]  NOV: None. Have messaged Dr. Annabella Russo and currently waiting on response.

## 2022-01-13 ENCOUNTER — TELEPHONE (OUTPATIENT)
Dept: PHYSICAL MEDICINE AND REHAB | Facility: CLINIC | Age: 52
End: 2022-01-13

## 2022-01-13 NOTE — TELEPHONE ENCOUNTER
Initiated authorization for LEFT L5 and LEFT S1 TFESIs   CPT 19068+96150+24664 dx:M54.59 to be done at [facility] with AIM online    Status: Approved-with order #[id number] valid 1/19/22-2/17/22

## 2022-01-13 NOTE — TELEPHONE ENCOUNTER
Patient has been scheduled for LEFT L5 and LEFT S1 TFESIs on 1/19/22 at the [facility] with Dr.Behar.   -Anesthesia type: Local.  -If receiving MAC or IVC sedation patient will need to get COVID tested 3 days prior even if already vaccinated (order placed by [facility]

## 2022-01-19 ENCOUNTER — OFFICE VISIT (OUTPATIENT)
Dept: SURGERY | Facility: CLINIC | Age: 52
End: 2022-01-19

## 2022-01-19 DIAGNOSIS — M51.37 DDD (DEGENERATIVE DISC DISEASE), LUMBOSACRAL: ICD-10-CM

## 2022-01-19 DIAGNOSIS — M51.26 BULGE OF LUMBAR DISC WITHOUT MYELOPATHY: ICD-10-CM

## 2022-01-19 DIAGNOSIS — M47.816 FACET SYNDROME, LUMBAR: ICD-10-CM

## 2022-01-19 DIAGNOSIS — M48.061 LUMBAR FORAMINAL STENOSIS: ICD-10-CM

## 2022-01-19 DIAGNOSIS — M47.816 LUMBAR SPONDYLOSIS: ICD-10-CM

## 2022-01-19 DIAGNOSIS — M54.59 MECHANICAL LOW BACK PAIN: Primary | ICD-10-CM

## 2022-01-19 DIAGNOSIS — M54.59 LUMBAR TRIGGER POINT SYNDROME: ICD-10-CM

## 2022-01-19 DIAGNOSIS — M51.16 LUMBAR DISC HERNIATION WITH RADICULOPATHY: ICD-10-CM

## 2022-01-19 PROCEDURE — 64484 NJX AA&/STRD TFRM EPI L/S EA: CPT | Performed by: PHYSICAL MEDICINE & REHABILITATION

## 2022-01-19 PROCEDURE — 64483 NJX AA&/STRD TFRM EPI L/S 1: CPT | Performed by: PHYSICAL MEDICINE & REHABILITATION

## 2022-01-19 NOTE — PROCEDURES
[address]    2-LEVEL LUMBAR TRANSFORAMINAL   NAME:  Ekaterina Childs    MR #:    [id number] :  1970     PHYSICIAN:  Yecenia Miller MD        Operative Report    DATE OF PROCEDURE: 2022   PREOPERATIVE DIAGNOSES: 1.  Shelbie Melton time, Omnipaque-240 contrast was used to obtain a good epidurogram indicating correct needle placement at each level. Then, aspiration was performed. No blood, fluid, or air was aspirated.   Then, the patient was injected with a 4 cc solution of 2 cc of 6

## 2022-03-15 ENCOUNTER — OFFICE VISIT (OUTPATIENT)
Dept: FAMILY MEDICINE CLINIC | Facility: CLINIC | Age: 52
End: 2022-03-15
Payer: COMMERCIAL

## 2022-03-15 VITALS
TEMPERATURE: 99 F | WEIGHT: 171 LBS | SYSTOLIC BLOOD PRESSURE: 94 MMHG | BODY MASS INDEX: 32 KG/M2 | HEART RATE: 80 BPM | OXYGEN SATURATION: 98 % | DIASTOLIC BLOOD PRESSURE: 62 MMHG

## 2022-03-15 DIAGNOSIS — E55.9 VITAMIN D DEFICIENCY: ICD-10-CM

## 2022-03-15 DIAGNOSIS — Z12.31 SCREENING MAMMOGRAM, ENCOUNTER FOR: ICD-10-CM

## 2022-03-15 DIAGNOSIS — Z00.00 PREVENTATIVE HEALTH CARE: Primary | ICD-10-CM

## 2022-03-15 DIAGNOSIS — Z12.11 ENCOUNTER FOR SCREENING COLONOSCOPY: ICD-10-CM

## 2022-03-15 DIAGNOSIS — E78.5 HYPERLIPIDEMIA, UNSPECIFIED HYPERLIPIDEMIA TYPE: ICD-10-CM

## 2022-03-15 PROCEDURE — 3074F SYST BP LT 130 MM HG: CPT | Performed by: INTERNAL MEDICINE

## 2022-03-15 PROCEDURE — 99214 OFFICE O/P EST MOD 30 MIN: CPT | Performed by: INTERNAL MEDICINE

## 2022-03-15 PROCEDURE — 3078F DIAST BP <80 MM HG: CPT | Performed by: INTERNAL MEDICINE

## 2022-03-15 RX ORDER — ROSUVASTATIN CALCIUM 40 MG/1
40 TABLET, COATED ORAL NIGHTLY
Qty: 120 TABLET | Refills: 0 | Status: SHIPPED | OUTPATIENT
Start: 2022-03-15

## 2022-03-15 RX ORDER — PANTOPRAZOLE SODIUM 20 MG/1
20 TABLET, DELAYED RELEASE ORAL
Qty: 120 TABLET | Refills: 0 | Status: SHIPPED | OUTPATIENT
Start: 2022-03-15

## 2022-03-15 RX ORDER — MELOXICAM 15 MG/1
15 TABLET ORAL DAILY PRN
Qty: 30 TABLET | Refills: 0 | Status: SHIPPED | OUTPATIENT
Start: 2022-03-15

## 2022-04-08 ENCOUNTER — TELEPHONE (OUTPATIENT)
Dept: GASTROENTEROLOGY | Facility: CLINIC | Age: 52
End: 2022-04-08

## 2022-04-08 ENCOUNTER — LAB ENCOUNTER (OUTPATIENT)
Dept: LAB | Facility: HOSPITAL | Age: 52
End: 2022-04-08
Attending: INTERNAL MEDICINE
Payer: COMMERCIAL

## 2022-04-08 ENCOUNTER — OFFICE VISIT (OUTPATIENT)
Dept: GASTROENTEROLOGY | Facility: CLINIC | Age: 52
End: 2022-04-08
Payer: COMMERCIAL

## 2022-04-08 VITALS
HEIGHT: 61 IN | DIASTOLIC BLOOD PRESSURE: 64 MMHG | BODY MASS INDEX: 32.85 KG/M2 | WEIGHT: 174 LBS | HEART RATE: 70 BPM | TEMPERATURE: 99 F | SYSTOLIC BLOOD PRESSURE: 105 MMHG

## 2022-04-08 DIAGNOSIS — E55.9 VITAMIN D DEFICIENCY: ICD-10-CM

## 2022-04-08 DIAGNOSIS — Z00.00 PREVENTATIVE HEALTH CARE: ICD-10-CM

## 2022-04-08 DIAGNOSIS — K59.04 CHRONIC IDIOPATHIC CONSTIPATION: Primary | ICD-10-CM

## 2022-04-08 DIAGNOSIS — K21.9 GASTROESOPHAGEAL REFLUX DISEASE, UNSPECIFIED WHETHER ESOPHAGITIS PRESENT: ICD-10-CM

## 2022-04-08 LAB
ALBUMIN SERPL-MCNC: 3.4 G/DL (ref 3.4–5)
ALBUMIN/GLOB SERPL: 1 {RATIO} (ref 1–2)
ALP LIVER SERPL-CCNC: 100 U/L
ALT SERPL-CCNC: 23 U/L
ANION GAP SERPL CALC-SCNC: 5 MMOL/L (ref 0–18)
AST SERPL-CCNC: 14 U/L (ref 15–37)
BASOPHILS # BLD AUTO: 0.04 X10(3) UL (ref 0–0.2)
BASOPHILS NFR BLD AUTO: 0.5 %
BILIRUB SERPL-MCNC: 0.5 MG/DL (ref 0.1–2)
BUN BLD-MCNC: 14 MG/DL (ref 7–18)
BUN/CREAT SERPL: 24.1 (ref 10–20)
CALCIUM BLD-MCNC: 8.9 MG/DL (ref 8.5–10.1)
CHLORIDE SERPL-SCNC: 109 MMOL/L (ref 98–112)
CHOLEST SERPL-MCNC: 127 MG/DL (ref ?–200)
CO2 SERPL-SCNC: 29 MMOL/L (ref 21–32)
CREAT BLD-MCNC: 0.58 MG/DL
DEPRECATED RDW RBC AUTO: 40.9 FL (ref 35.1–46.3)
EOSINOPHIL # BLD AUTO: 0.12 X10(3) UL (ref 0–0.7)
EOSINOPHIL NFR BLD AUTO: 1.4 %
ERYTHROCYTE [DISTWIDTH] IN BLOOD BY AUTOMATED COUNT: 12.3 % (ref 11–15)
FASTING PATIENT LIPID ANSWER: YES
FASTING STATUS PATIENT QL REPORTED: YES
GLOBULIN PLAS-MCNC: 3.3 G/DL (ref 2.8–4.4)
GLUCOSE BLD-MCNC: 75 MG/DL (ref 70–99)
HCT VFR BLD AUTO: 40.9 %
HDLC SERPL-MCNC: 55 MG/DL (ref 40–59)
HGB BLD-MCNC: 13.2 G/DL
IMM GRANULOCYTES # BLD AUTO: 0.04 X10(3) UL (ref 0–1)
IMM GRANULOCYTES NFR BLD: 0.5 %
LDLC SERPL CALC-MCNC: 53 MG/DL (ref ?–100)
LYMPHOCYTES # BLD AUTO: 1.5 X10(3) UL (ref 1–4)
LYMPHOCYTES NFR BLD AUTO: 17 %
MCH RBC QN AUTO: 29.3 PG (ref 26–34)
MCHC RBC AUTO-ENTMCNC: 32.3 G/DL (ref 31–37)
MCV RBC AUTO: 90.9 FL
MONOCYTES # BLD AUTO: 0.53 X10(3) UL (ref 0.1–1)
MONOCYTES NFR BLD AUTO: 6 %
NEUTROPHILS # BLD AUTO: 6.58 X10 (3) UL (ref 1.5–7.7)
NEUTROPHILS # BLD AUTO: 6.58 X10(3) UL (ref 1.5–7.7)
NEUTROPHILS NFR BLD AUTO: 74.6 %
NONHDLC SERPL-MCNC: 72 MG/DL (ref ?–130)
OSMOLALITY SERPL CALC.SUM OF ELEC: 295 MOSM/KG (ref 275–295)
PLATELET # BLD AUTO: 392 10(3)UL (ref 150–450)
POTASSIUM SERPL-SCNC: 4.3 MMOL/L (ref 3.5–5.1)
PROT SERPL-MCNC: 6.7 G/DL (ref 6.4–8.2)
RBC # BLD AUTO: 4.5 X10(6)UL
SODIUM SERPL-SCNC: 143 MMOL/L (ref 136–145)
TRIGL SERPL-MCNC: 106 MG/DL (ref 30–149)
VIT D+METAB SERPL-MCNC: 17.6 NG/ML (ref 30–100)
VLDLC SERPL CALC-MCNC: 15 MG/DL (ref 0–30)
WBC # BLD AUTO: 8.8 X10(3) UL (ref 4–11)

## 2022-04-08 PROCEDURE — 85025 COMPLETE CBC W/AUTO DIFF WBC: CPT | Performed by: INTERNAL MEDICINE

## 2022-04-08 PROCEDURE — 80053 COMPREHEN METABOLIC PANEL: CPT

## 2022-04-08 PROCEDURE — 82306 VITAMIN D 25 HYDROXY: CPT

## 2022-04-08 PROCEDURE — 36415 COLL VENOUS BLD VENIPUNCTURE: CPT | Performed by: INTERNAL MEDICINE

## 2022-04-08 PROCEDURE — 99243 OFF/OP CNSLTJ NEW/EST LOW 30: CPT | Performed by: INTERNAL MEDICINE

## 2022-04-08 PROCEDURE — 3074F SYST BP LT 130 MM HG: CPT | Performed by: INTERNAL MEDICINE

## 2022-04-08 PROCEDURE — 3078F DIAST BP <80 MM HG: CPT | Performed by: INTERNAL MEDICINE

## 2022-04-08 PROCEDURE — 3008F BODY MASS INDEX DOCD: CPT | Performed by: INTERNAL MEDICINE

## 2022-04-08 PROCEDURE — 80061 LIPID PANEL: CPT | Performed by: INTERNAL MEDICINE

## 2022-04-08 RX ORDER — PANTOPRAZOLE SODIUM 20 MG/1
20 TABLET, DELAYED RELEASE ORAL
Qty: 90 TABLET | Refills: 3 | Status: SHIPPED | OUTPATIENT
Start: 2022-04-08 | End: 2022-07-07

## 2022-04-08 NOTE — PATIENT INSTRUCTIONS
1. Schedule colonoscopy with MAC [Diagnosis: screening]    2.  bowel prep from pharmacy (single dose Trilyte - [place] Islander)  -Buy over the counter dulcolax laxative, and take one tablet daily for 3 days prior to drinking the bowel prep. 3. Continue all medications for procedure    4. Read all bowel prep instructions carefully    5. AVOID seeds, nuts, popcorn, raw fruits and vegetables (cooked is okay) for 2-3 days before procedure    6. You MAY need to go for COVID testing 72 hours before procedure depending on if you are having any respiratory symptoms. The testing team will call you a few days before your procedure to discuss with you if testing is required. If you are asked to go for COVID testing and do not completed the test, the procedure cannot be performed. 7. If you start any NEW medication after your visit today, please notify us. Certain medications will need to be held before the procedure, or the procedure cannot be performed. 8. Start metamucil powder once a day, add dulcolax 3x a week (Mon, Wed and Sat)    9. Avoid caffeine, chocolate, peppermint, and alcohol.  Also avoid lying down flat or in a recumbent position for 3 hours after a meal.

## 2022-04-08 NOTE — TELEPHONE ENCOUNTER
Scheduled for:  Colonoscopy 08885  Provider Name:  Dr Leeanna Amado  Date:  7/5/2022  Location:  [facility]  Sedation:  MAC  Time:  9:30am (pt is aware to arrive at 8:30am)  Prep:  Single dose trilyte - 1 laxative daily for three days before the start of the prep date  Meds/Allergies Reconciled?:  Physician reviewed   Diagnosis with codes:  Colon screening Z12.11  Was patient informed to call insurance with codes (Y/N):  Yes  Referral sent?:  Referral was sent at the time of electronic surgical scheduling. [facility] or [facility]  notified?:  I sent an electronic request to Endo Scheduling and received a confirmation today. Medication Orders:   This patient verbally confirmed that she is not taking:  Iron, blood thinners, BP meds, and is not diabetic  Not latex allergy, Not PCN allergy and does not have a pacemaker  Pt is aware to NOT take iron pills, herbal meds and diet supplements for 7 days before exam. Also to NOT take any form of alcohol, recreational drugs and any forms of ED meds 24 hours before exam.       Misc Orders:  Patient is aware that the ENDO dept will call to schedule a COVID 19 test before the procedure      Further instructions given by staff:   I discussed the prep instructions with the patient at the time of the appointment which she verbally understood

## 2022-04-16 RX ORDER — VITAMIN B COMPLEX
1 TABLET ORAL DAILY
Qty: 90 TABLET | Refills: 2 | Status: SHIPPED | OUTPATIENT
Start: 2022-04-16 | End: 2022-05-16

## 2022-04-19 ENCOUNTER — TELEPHONE (OUTPATIENT)
Dept: INTERNAL MEDICINE CLINIC | Facility: CLINIC | Age: 52
End: 2022-04-19

## 2022-04-19 NOTE — TELEPHONE ENCOUNTER
Patient was contacted regarding her lab results and is aware she should use Vit D supplements and to continue using Crestor. ----- Message from Esther Posey MD sent at 4/16/2022 12:01 AM CDT -----  Please inform her that labs reviewed. Lipid panel with improvement. Continue statin. Vit D is low. Recommend she continue taking 1000 units daily.   Refill sent      Rest of labs normal.

## 2022-05-02 ENCOUNTER — OFFICE VISIT (OUTPATIENT)
Dept: PHYSICAL MEDICINE AND REHAB | Facility: CLINIC | Age: 52
End: 2022-05-02
Payer: COMMERCIAL

## 2022-05-02 ENCOUNTER — TELEPHONE (OUTPATIENT)
Dept: NEUROLOGY | Facility: CLINIC | Age: 52
End: 2022-05-02

## 2022-05-02 VITALS
HEIGHT: 61 IN | SYSTOLIC BLOOD PRESSURE: 102 MMHG | DIASTOLIC BLOOD PRESSURE: 64 MMHG | WEIGHT: 174 LBS | HEART RATE: 77 BPM | OXYGEN SATURATION: 98 % | BODY MASS INDEX: 32.85 KG/M2

## 2022-05-02 DIAGNOSIS — M67.919 TENDINOPATHY OF ROTATOR CUFF, UNSPECIFIED LATERALITY: ICD-10-CM

## 2022-05-02 DIAGNOSIS — R20.0 NUMBNESS IN BOTH HANDS: ICD-10-CM

## 2022-05-02 DIAGNOSIS — M51.16 LUMBAR DISC HERNIATION WITH RADICULOPATHY: ICD-10-CM

## 2022-05-02 DIAGNOSIS — M77.00 MEDIAL EPICONDYLITIS OF ELBOW, UNSPECIFIED LATERALITY: ICD-10-CM

## 2022-05-02 DIAGNOSIS — M48.061 LUMBAR FORAMINAL STENOSIS: ICD-10-CM

## 2022-05-02 DIAGNOSIS — M25.512 CHRONIC LEFT SHOULDER PAIN: ICD-10-CM

## 2022-05-02 DIAGNOSIS — M51.26 BULGE OF LUMBAR DISC WITHOUT MYELOPATHY: ICD-10-CM

## 2022-05-02 DIAGNOSIS — M25.522 BILATERAL ELBOW JOINT PAIN: ICD-10-CM

## 2022-05-02 DIAGNOSIS — G56.03 BILATERAL CARPAL TUNNEL SYNDROME: ICD-10-CM

## 2022-05-02 DIAGNOSIS — M51.37 DDD (DEGENERATIVE DISC DISEASE), LUMBOSACRAL: ICD-10-CM

## 2022-05-02 DIAGNOSIS — M54.2 TRIGGER POINT OF NECK: ICD-10-CM

## 2022-05-02 DIAGNOSIS — M54.59 LUMBAR TRIGGER POINT SYNDROME: ICD-10-CM

## 2022-05-02 DIAGNOSIS — M47.816 FACET SYNDROME, LUMBAR: ICD-10-CM

## 2022-05-02 DIAGNOSIS — M79.10 MYALGIA: ICD-10-CM

## 2022-05-02 DIAGNOSIS — M99.9 BIOMECHANICAL LESION: ICD-10-CM

## 2022-05-02 DIAGNOSIS — G89.29 CHRONIC LEFT SHOULDER PAIN: ICD-10-CM

## 2022-05-02 DIAGNOSIS — M47.816 LUMBAR SPONDYLOSIS: ICD-10-CM

## 2022-05-02 DIAGNOSIS — M54.59 MECHANICAL LOW BACK PAIN: Primary | ICD-10-CM

## 2022-05-02 DIAGNOSIS — T39.395A NSAID INDUCED GASTRITIS: ICD-10-CM

## 2022-05-02 DIAGNOSIS — M25.521 BILATERAL ELBOW JOINT PAIN: ICD-10-CM

## 2022-05-02 DIAGNOSIS — K29.60 NSAID INDUCED GASTRITIS: ICD-10-CM

## 2022-05-02 DIAGNOSIS — G25.89 SCAPULAR DYSKINESIS: ICD-10-CM

## 2022-05-02 NOTE — PATIENT INSTRUCTIONS
1) My office will call you to schedule the LEFT L5 and LEFT and S1 TFESI under local anesthesia once the procedure is approved by your insurance carrier. 2) If limited improvement, then would recommend BILATERAL L4-L5 and L5-S1 facet joint injections  3) Continue with your home exercise program twice per day  4) Follow up with me 2 weeks after the procedure  5) Tylenol 500-1000 mg every 6-8 hours as needed for pain. No more than 3000 mg daily.

## 2022-05-02 NOTE — TELEPHONE ENCOUNTER
[employer] for authorization of approval forLEFt L5 and LEFt S1 TFESI  Cpt codes 93418-WX, 53881-DG, 67836. Authorization # [id number] valid 05/11/22-06/09/22. Will inform Nursing. Aline Wagoner

## 2022-05-03 NOTE — TELEPHONE ENCOUNTER
Patient has been scheduled for LEFt L5 and LEFt S1 TFESI on 5/11/22 at the [facility] with Addy Found. -Anesthesia type: LOCAL. -If receiving MAC or IVC sedation patient will need to get COVID tested 3 days prior even if already vaccinated (order placed by [facility].)  -If scheduling [facility] covid testing required for all procedures whether patient is vaccinated or not. -Patient informed not to eat or drink anything after midnight the night prior to the procedure, if being sedated. -Patient was advised that if he/she does receive the covid vaccine it needs to be at least 2 weeks before or after the injection. -Medications and allergies reviewed. -Patient reminded to hold NSAIDs (Ibuprofen, ASA 81, Aleve, Naproxen, Mobic etc.) for 3 days prior to [facility]  if BMI is greater than 35. For Cervical injections only hold multivitamins, Vitamin E, Fish Oil, Phentermine (Lomaira) for 7 days prior to injection and NSAIDS.  mg to be held for 7 days prior to injections.  -If patient is receiving MAC/IVCS Phentermine Gaylia Few) will need to be held for 7 days prior to injection.  -If on blood thinner clearance has been received to hold this medication by provider.   -Patient informed he/she will need a  to and from procedure. -[facility] is located in the [facility] 1st floor. Patient may park in the yellow parking. Patient verbalized understanding and agrees with plan.  -----> Scheduled in Epic: Yes  -----> Scheduled in Casetabs:  Yes

## 2022-05-11 ENCOUNTER — OFFICE VISIT (OUTPATIENT)
Dept: SURGERY | Facility: CLINIC | Age: 52
End: 2022-05-11

## 2022-05-11 DIAGNOSIS — M48.061 LUMBAR FORAMINAL STENOSIS: ICD-10-CM

## 2022-05-11 DIAGNOSIS — M54.16 LUMBAR RADICULOPATHY: ICD-10-CM

## 2022-05-11 DIAGNOSIS — M47.816 LUMBAR SPONDYLOSIS: ICD-10-CM

## 2022-05-11 DIAGNOSIS — M54.59 MECHANICAL LOW BACK PAIN: ICD-10-CM

## 2022-05-11 DIAGNOSIS — M54.59 LUMBAR TRIGGER POINT SYNDROME: ICD-10-CM

## 2022-05-11 DIAGNOSIS — M51.37 DDD (DEGENERATIVE DISC DISEASE), LUMBOSACRAL: Primary | ICD-10-CM

## 2022-05-11 DIAGNOSIS — M51.16 LUMBAR DISC HERNIATION WITH RADICULOPATHY: ICD-10-CM

## 2022-05-11 DIAGNOSIS — M51.26 BULGE OF LUMBAR DISC WITHOUT MYELOPATHY: ICD-10-CM

## 2022-05-11 DIAGNOSIS — M47.816 FACET SYNDROME, LUMBAR: ICD-10-CM

## 2022-05-11 PROCEDURE — 64484 NJX AA&/STRD TFRM EPI L/S EA: CPT | Performed by: PHYSICAL MEDICINE & REHABILITATION

## 2022-05-11 PROCEDURE — 64483 NJX AA&/STRD TFRM EPI L/S 1: CPT | Performed by: PHYSICAL MEDICINE & REHABILITATION

## 2022-05-11 NOTE — PROCEDURES
[facility] [address]    2-LEVEL LUMBAR TRANSFORAMINAL   NAME:  Ramos Hooker    MR #:    [id number] :  1970     PHYSICIAN:  Pretty Ray MD        Operative Report    DATE OF PROCEDURE: 2022   PREOPERATIVE DIAGNOSES: 1. DDD (degenerative disc disease), lumbosacral    2. Mechanical low back pain    3. Facet syndrome, lumbar    4. Bulge of lumbar disc without myelopathy    5. Lumbar spondylosis    6. Lumbar trigger point syndrome    7. Lumbar foraminal stenosis    8. Lumbar disc herniation with radiculopathy    9. Lumbar radiculopathy        POSTOPERATIVE DIAGNOSES:   1. DDD (degenerative disc disease), lumbosacral    2. Mechanical low back pain    3. Facet syndrome, lumbar    4. Bulge of lumbar disc without myelopathy    5. Lumbar spondylosis    6. Lumbar trigger point syndrome    7. Lumbar foraminal stenosis    8. Lumbar disc herniation with radiculopathy    9. Lumbar radiculopathy        PROCEDURES: Left L5 and S1 transforaminal epidural steroid injections done under fluoroscopic guidance with contrast enhancement. SURGEON: Pretty Ray MD   ANESTHESIA: Local   INDICATIONS:      OPERATIVE PROCEDURE:  Written consent was obtained from the patient. The patient was brought into the operating room and placed in the prone position on the fluoroscopy table with pillow underneath the abdomen. The patient's skin was cleaned and draped in a normal sterile fashion. Using AP fluoroscopy, all five lumbar vertebrae were identified. When the Left L5 and S1 vertebrae were identified, fluoroscopy was right anterior obliqued opening up the Left L5 and S1 intervertebral foramen. At this point in time, each site was anesthetized with 1% PF lidocaine without epinephrine. Then, 5 inch, 22-gauge spinal needles were inserted and directed towards the Left L5 and S1 intervertebral foramen.   When they felt to be in good position, AP fluoroscopy was used to advance the needles to the 6 o'clock position on the Left L5 and S1  pedicles. At this point in time, Omnipaque-240 contrast was used to obtain a good epidurogram indicating correct needle placement at each level. Then, aspiration was performed. No blood, fluid, or air was aspirated. Then, the patient was injected with a 4 cc solution of 2 cc of 6 mg/cc of Betamethasone and 2 cc of 1% PF lidocaine without epinephrine at each site. After this, the needles were removed. Each site was cleaned. Band-Aids were applied. The patient was transferred to the cart and into [facility]. The patient was given discharge instructions and will follow up in the clinic as scheduled. Throughout the whole procedure, the patient's pulse oximetry and vital signs were monitored and they remained completely stable. Also, throughout the whole procedure, prior to injection of any medication, aspiration was performed. No blood, fluid, or air was aspirated at anytime. Kaity Guy.  Antonella Eubanks MD, [address] [facility]  Physical Medicine and Rehabilitation/Sports Medicine  [place] [address]

## 2022-05-11 NOTE — PATIENT INSTRUCTIONS
Post Injection Instructions     1. Please do not do anything strenuous over the next 24 hours (if you had a knee injection do not walk more than 2 city blocks, do not attend any aerobic classes, do not run, no heavy lifting, no prolong standing). 2. You may resume your day to day activities after your injection. 3. You may experience some mild amount of swelling and discomfort after the procedure. 4. Please ice the area that was injected at least 5-6 times a day (15 minutes) for two days after (this will help prevent worsening pain that sometimes occurs after an injection). 5. Only take tylenol if needed for pain for the first few days. 6. Watch for signs of infection which include redness, warmth, worsening pain, fevers or chills. If you develop any of these signs call the office immediately at [phone]    7. My office will call you in 2 days to check in and see how you are feeling. 8. Follow up with me in the office 2 weeks after your injection. Everyone responds differently to injections, but you can expect your peak effects a few weeks after your last injection. Shara Hunter.  Rupali Mccurdy MD  Physical Medicine and Rehabilitation/Sports Medicine  MEDICAL CENTER [facility]

## 2022-06-02 ENCOUNTER — OFFICE VISIT (OUTPATIENT)
Dept: PHYSICAL MEDICINE AND REHAB | Facility: CLINIC | Age: 52
End: 2022-06-02
Payer: COMMERCIAL

## 2022-06-02 ENCOUNTER — TELEPHONE (OUTPATIENT)
Dept: NEUROLOGY | Facility: CLINIC | Age: 52
End: 2022-06-02

## 2022-06-02 VITALS — WEIGHT: 174 LBS | OXYGEN SATURATION: 98 % | HEART RATE: 79 BPM | BODY MASS INDEX: 32.85 KG/M2 | HEIGHT: 61 IN

## 2022-06-02 DIAGNOSIS — R20.0 NUMBNESS IN BOTH HANDS: ICD-10-CM

## 2022-06-02 DIAGNOSIS — M77.00 MEDIAL EPICONDYLITIS OF ELBOW, UNSPECIFIED LATERALITY: ICD-10-CM

## 2022-06-02 DIAGNOSIS — M25.521 ELBOW PAIN, CHRONIC, RIGHT: ICD-10-CM

## 2022-06-02 DIAGNOSIS — E78.5 HYPERLIPIDEMIA, UNSPECIFIED HYPERLIPIDEMIA TYPE: ICD-10-CM

## 2022-06-02 DIAGNOSIS — M47.816 LUMBAR SPONDYLOSIS: ICD-10-CM

## 2022-06-02 DIAGNOSIS — M51.16 LUMBAR DISC HERNIATION WITH RADICULOPATHY: ICD-10-CM

## 2022-06-02 DIAGNOSIS — G89.29 CHRONIC LEFT SHOULDER PAIN: ICD-10-CM

## 2022-06-02 DIAGNOSIS — M79.10 MYALGIA: ICD-10-CM

## 2022-06-02 DIAGNOSIS — G89.29 ELBOW PAIN, CHRONIC, RIGHT: ICD-10-CM

## 2022-06-02 DIAGNOSIS — M99.9 BIOMECHANICAL LESION: ICD-10-CM

## 2022-06-02 DIAGNOSIS — G25.89 SCAPULAR DYSKINESIS: ICD-10-CM

## 2022-06-02 DIAGNOSIS — M25.512 CHRONIC LEFT SHOULDER PAIN: ICD-10-CM

## 2022-06-02 DIAGNOSIS — M48.061 LUMBAR FORAMINAL STENOSIS: ICD-10-CM

## 2022-06-02 DIAGNOSIS — M51.26 BULGE OF LUMBAR DISC WITHOUT MYELOPATHY: ICD-10-CM

## 2022-06-02 DIAGNOSIS — M54.59 MECHANICAL LOW BACK PAIN: Primary | ICD-10-CM

## 2022-06-02 DIAGNOSIS — M51.37 DDD (DEGENERATIVE DISC DISEASE), LUMBOSACRAL: ICD-10-CM

## 2022-06-02 DIAGNOSIS — M47.816 FACET SYNDROME, LUMBAR: ICD-10-CM

## 2022-06-02 DIAGNOSIS — M54.2 TRIGGER POINT OF NECK: ICD-10-CM

## 2022-06-02 DIAGNOSIS — M54.59 LUMBAR TRIGGER POINT SYNDROME: ICD-10-CM

## 2022-06-02 PROCEDURE — 3008F BODY MASS INDEX DOCD: CPT | Performed by: PHYSICAL MEDICINE & REHABILITATION

## 2022-06-02 PROCEDURE — 99214 OFFICE O/P EST MOD 30 MIN: CPT | Performed by: PHYSICAL MEDICINE & REHABILITATION

## 2022-06-02 RX ORDER — GABAPENTIN 100 MG/1
100 CAPSULE ORAL 3 TIMES DAILY
Qty: 90 CAPSULE | Refills: 0 | Status: SHIPPED | OUTPATIENT
Start: 2022-06-02

## 2022-06-02 NOTE — TELEPHONE ENCOUNTER
[employer] for authorization of approval for RIGHT medial epicondyle CSI under ultrasound guidance cpt code [address] [id number], [id number]. Authorization is ot required,Transaction ID: [id number]. Will inform Nursing. Lucio Tirado

## 2022-06-02 NOTE — PATIENT INSTRUCTIONS
1) Start gabapentin 100 mg three times per day. If limited improvement, then would increase to 200 mg three times per day. 2) My office will call you to schedule the RIGHT medial epicondyle CSI under ultrasound guidance once the procedure is approved by your insurance carrier.     3) Continue home exercise program for the lumbar spine and right elbow

## 2022-06-30 NOTE — PAT NURSING NOTE
Patient currently denies any COVID symptoms. No COVID test needed. Patient instructed to call GI office if any new COVID symptoms or contacts to [place] before procedure.

## 2022-07-05 ENCOUNTER — HOSPITAL ENCOUNTER (OUTPATIENT)
Facility: HOSPITAL | Age: 52
Setting detail: HOSPITAL OUTPATIENT SURGERY
Discharge: HOME | End: 2022-07-05
Attending: INTERNAL MEDICINE | Admitting: INTERNAL MEDICINE
Payer: COMMERCIAL

## 2022-07-05 ENCOUNTER — ANESTHESIA (OUTPATIENT)
Dept: ENDOSCOPY | Facility: HOSPITAL | Age: 52
End: 2022-07-05
Payer: COMMERCIAL

## 2022-07-05 ENCOUNTER — ANESTHESIA EVENT (OUTPATIENT)
Dept: ENDOSCOPY | Facility: HOSPITAL | Age: 52
End: 2022-07-05
Payer: COMMERCIAL

## 2022-07-05 VITALS
BODY MASS INDEX: 33.99 KG/M2 | DIASTOLIC BLOOD PRESSURE: 60 MMHG | SYSTOLIC BLOOD PRESSURE: 101 MMHG | RESPIRATION RATE: 15 BRPM | TEMPERATURE: 98 F | WEIGHT: 180 LBS | HEART RATE: 65 BPM | OXYGEN SATURATION: 94 % | HEIGHT: 61 IN

## 2022-07-05 DIAGNOSIS — Z12.11 COLON CANCER SCREENING: ICD-10-CM

## 2022-07-05 PROCEDURE — 0DJD8ZZ INSPECTION OF LOWER INTESTINAL TRACT, VIA NATURAL OR ARTIFICIAL OPENING ENDOSCOPIC: ICD-10-PCS | Performed by: INTERNAL MEDICINE

## 2022-07-05 PROCEDURE — 45378 DIAGNOSTIC COLONOSCOPY: CPT | Performed by: INTERNAL MEDICINE

## 2022-07-05 RX ORDER — LIDOCAINE HYDROCHLORIDE 10 MG/ML
INJECTION, SOLUTION EPIDURAL; INFILTRATION; INTRACAUDAL; PERINEURAL AS NEEDED
Status: DISCONTINUED | OUTPATIENT
Start: 2022-07-05 | End: 2022-07-05 | Stop reason: SURG

## 2022-07-05 RX ORDER — SODIUM CHLORIDE, SODIUM LACTATE, POTASSIUM CHLORIDE, CALCIUM CHLORIDE 600; 310; 30; 20 MG/100ML; MG/100ML; MG/100ML; MG/100ML
INJECTION, SOLUTION INTRAVENOUS CONTINUOUS
Status: DISCONTINUED | OUTPATIENT
Start: 2022-07-05 | End: 2022-07-05

## 2022-07-05 RX ADMIN — SODIUM CHLORIDE, SODIUM LACTATE, POTASSIUM CHLORIDE, CALCIUM CHLORIDE: 600; 310; 30; 20 INJECTION, SOLUTION INTRAVENOUS at 09:41:00

## 2022-07-05 RX ADMIN — LIDOCAINE HYDROCHLORIDE 50 MG: 10 INJECTION, SOLUTION EPIDURAL; INFILTRATION; INTRACAUDAL; PERINEURAL at 09:24:00

## 2022-07-05 RX ADMIN — SODIUM CHLORIDE, SODIUM LACTATE, POTASSIUM CHLORIDE, CALCIUM CHLORIDE: 600; 310; 30; 20 INJECTION, SOLUTION INTRAVENOUS at 09:21:00

## 2022-07-11 ENCOUNTER — OFFICE VISIT (OUTPATIENT)
Dept: PHYSICAL MEDICINE AND REHAB | Facility: CLINIC | Age: 52
End: 2022-07-11
Payer: COMMERCIAL

## 2022-07-11 DIAGNOSIS — M25.521 BILATERAL ELBOW JOINT PAIN: Primary | ICD-10-CM

## 2022-07-11 DIAGNOSIS — G89.29 ELBOW PAIN, CHRONIC, RIGHT: ICD-10-CM

## 2022-07-11 DIAGNOSIS — M67.919 TENDINOPATHY OF ROTATOR CUFF, UNSPECIFIED LATERALITY: ICD-10-CM

## 2022-07-11 DIAGNOSIS — M25.521 ELBOW PAIN, CHRONIC, RIGHT: ICD-10-CM

## 2022-07-11 DIAGNOSIS — M25.522 BILATERAL ELBOW JOINT PAIN: Primary | ICD-10-CM

## 2022-07-11 NOTE — PATIENT INSTRUCTIONS
Post Injection Instructions     1. Please do not do anything strenuous over the next two days (if you had a knee injection do not walk more than 2 city blocks, do not attend any aerobic classes, do not run, no heavy lifting, no prolong standing). 2. You may resume your day to day activities after your injection. 3. You may experience some mild amount of swelling after the procedure. 4. Please ice your joint that was injected at least 5-6 times a day (15 minutes) for two days after (this will help prevent worsening pain that sometimes occurs after an injection). 5. Only take tylenol if needed for pain for the first few days. 6. Watch for signs of infection which include redness, warmth, worsening pain, fevers or chills. If you develop any of these signs call the office immediately at [phone]    Everyone responds differently to injections, but you can expect your peak effects a few weeks after your last injection. Yessica Soto.  Dora Chandler MD  Physical Medicine and Rehabilitation/Sports Medicine  MEDICAL CENTER [facility]

## 2022-07-18 ENCOUNTER — TELEPHONE (OUTPATIENT)
Dept: GASTROENTEROLOGY | Facility: CLINIC | Age: 52
End: 2022-07-18

## 2022-07-18 NOTE — TELEPHONE ENCOUNTER
Health Maintenance Updated. 10 year colonoscopy recall entered into patient outreach in [address] [place] [address] Rd. Next colonoscopy will be due 7/5/2032.

## 2022-07-21 ENCOUNTER — LAB ENCOUNTER (OUTPATIENT)
Dept: LAB | Facility: REFERENCE LAB | Age: 52
End: 2022-07-21
Attending: INTERNAL MEDICINE
Payer: COMMERCIAL

## 2022-07-21 ENCOUNTER — OFFICE VISIT (OUTPATIENT)
Dept: INTERNAL MEDICINE CLINIC | Facility: CLINIC | Age: 52
End: 2022-07-21
Payer: COMMERCIAL

## 2022-07-21 VITALS
HEART RATE: 99 BPM | SYSTOLIC BLOOD PRESSURE: 116 MMHG | OXYGEN SATURATION: 99 % | DIASTOLIC BLOOD PRESSURE: 70 MMHG | BODY MASS INDEX: 33 KG/M2 | WEIGHT: 177 LBS | TEMPERATURE: 98 F

## 2022-07-21 DIAGNOSIS — G43.901 MIGRAINE WITH STATUS MIGRAINOSUS, NOT INTRACTABLE, UNSPECIFIED MIGRAINE TYPE: Primary | ICD-10-CM

## 2022-07-21 DIAGNOSIS — R53.83 OTHER FATIGUE: ICD-10-CM

## 2022-07-21 LAB
BASOPHILS # BLD AUTO: 0.06 X10(3) UL (ref 0–0.2)
BASOPHILS NFR BLD AUTO: 0.5 %
DEPRECATED RDW RBC AUTO: 41.4 FL (ref 35.1–46.3)
EOSINOPHIL # BLD AUTO: 0.15 X10(3) UL (ref 0–0.7)
EOSINOPHIL NFR BLD AUTO: 1.3 %
ERYTHROCYTE [DISTWIDTH] IN BLOOD BY AUTOMATED COUNT: 12.6 % (ref 11–15)
HCT VFR BLD AUTO: 38.4 %
HGB BLD-MCNC: 12.7 G/DL
IMM GRANULOCYTES # BLD AUTO: 0.08 X10(3) UL (ref 0–1)
IMM GRANULOCYTES NFR BLD: 0.7 %
LYMPHOCYTES # BLD AUTO: 2.68 X10(3) UL (ref 1–4)
LYMPHOCYTES NFR BLD AUTO: 23.8 %
MCH RBC QN AUTO: 29.5 PG (ref 26–34)
MCHC RBC AUTO-ENTMCNC: 33.1 G/DL (ref 31–37)
MCV RBC AUTO: 89.1 FL
MONOCYTES # BLD AUTO: 0.65 X10(3) UL (ref 0.1–1)
MONOCYTES NFR BLD AUTO: 5.8 %
NEUTROPHILS # BLD AUTO: 7.65 X10 (3) UL (ref 1.5–7.7)
NEUTROPHILS # BLD AUTO: 7.65 X10(3) UL (ref 1.5–7.7)
NEUTROPHILS NFR BLD AUTO: 67.9 %
PLATELET # BLD AUTO: 412 10(3)UL (ref 150–450)
RBC # BLD AUTO: 4.31 X10(6)UL
TSI SER-ACNC: 0.79 MIU/ML (ref 0.36–3.74)
WBC # BLD AUTO: 11.3 X10(3) UL (ref 4–11)

## 2022-07-21 PROCEDURE — 3078F DIAST BP <80 MM HG: CPT | Performed by: INTERNAL MEDICINE

## 2022-07-21 PROCEDURE — 36415 COLL VENOUS BLD VENIPUNCTURE: CPT

## 2022-07-21 PROCEDURE — 99214 OFFICE O/P EST MOD 30 MIN: CPT | Performed by: INTERNAL MEDICINE

## 2022-07-21 PROCEDURE — 85025 COMPLETE CBC W/AUTO DIFF WBC: CPT | Performed by: INTERNAL MEDICINE

## 2022-07-21 PROCEDURE — 3074F SYST BP LT 130 MM HG: CPT | Performed by: INTERNAL MEDICINE

## 2022-07-21 PROCEDURE — 84443 ASSAY THYROID STIM HORMONE: CPT

## 2022-07-21 RX ORDER — CYCLOBENZAPRINE HCL 10 MG
10 TABLET ORAL 3 TIMES DAILY
Qty: 90 TABLET | Refills: 0 | Status: SHIPPED | OUTPATIENT
Start: 2022-07-21

## 2022-07-21 RX ORDER — AZELASTINE 1 MG/ML
1 SPRAY, METERED NASAL 2 TIMES DAILY
Qty: 1 EACH | Refills: 1 | Status: SHIPPED | OUTPATIENT
Start: 2022-07-21

## 2022-07-21 RX ORDER — ROSUVASTATIN CALCIUM 40 MG/1
40 TABLET, COATED ORAL NIGHTLY
Qty: 120 TABLET | Refills: 0 | Status: SHIPPED | OUTPATIENT
Start: 2022-07-21

## 2022-07-21 RX ORDER — BUTALBITAL, ACETAMINOPHEN, CAFFEINE AND CODEINE PHOSPHATE 300; 50; 40; 30 MG/1; MG/1; MG/1; MG/1
1 CAPSULE ORAL 2 TIMES DAILY PRN
Qty: 60 CAPSULE | Refills: 0 | Status: SHIPPED | OUTPATIENT
Start: 2022-07-21

## 2022-08-03 ENCOUNTER — HOSPITAL ENCOUNTER (EMERGENCY)
Facility: HOSPITAL | Age: 52
Discharge: HOME | End: 2022-08-03
Payer: COMMERCIAL

## 2022-08-03 ENCOUNTER — APPOINTMENT (OUTPATIENT)
Dept: ULTRASOUND IMAGING | Facility: HOSPITAL | Age: 52
End: 2022-08-03
Attending: NURSE PRACTITIONER
Payer: COMMERCIAL

## 2022-08-03 ENCOUNTER — APPOINTMENT (OUTPATIENT)
Dept: GENERAL RADIOLOGY | Facility: HOSPITAL | Age: 52
End: 2022-08-03
Attending: NURSE PRACTITIONER
Payer: COMMERCIAL

## 2022-08-03 VITALS
SYSTOLIC BLOOD PRESSURE: 106 MMHG | WEIGHT: 175 LBS | BODY MASS INDEX: 33.04 KG/M2 | HEIGHT: 61 IN | DIASTOLIC BLOOD PRESSURE: 70 MMHG | RESPIRATION RATE: 16 BRPM | TEMPERATURE: 98 F | OXYGEN SATURATION: 98 % | HEART RATE: 88 BPM

## 2022-08-03 DIAGNOSIS — R10.13 EPIGASTRIC PAIN: Primary | ICD-10-CM

## 2022-08-03 LAB
ALBUMIN SERPL-MCNC: 3.5 G/DL (ref 3.4–5)
ALP LIVER SERPL-CCNC: 114 U/L
ALT SERPL-CCNC: 25 U/L
ANION GAP SERPL CALC-SCNC: 6 MMOL/L (ref 0–18)
AST SERPL-CCNC: 15 U/L (ref 15–37)
B-HCG UR QL: NEGATIVE
BASOPHILS # BLD AUTO: 0.01 X10(3) UL (ref 0–0.2)
BASOPHILS NFR BLD AUTO: 0.1 %
BILIRUB DIRECT SERPL-MCNC: 0.2 MG/DL (ref 0–0.2)
BILIRUB SERPL-MCNC: 0.6 MG/DL (ref 0.1–2)
BUN BLD-MCNC: 19 MG/DL (ref 7–18)
BUN/CREAT SERPL: 28.4 (ref 10–20)
CALCIUM BLD-MCNC: 8.7 MG/DL (ref 8.5–10.1)
CHLORIDE SERPL-SCNC: 106 MMOL/L (ref 98–112)
CO2 SERPL-SCNC: 29 MMOL/L (ref 21–32)
CREAT BLD-MCNC: 0.67 MG/DL
DEPRECATED RDW RBC AUTO: 42.9 FL (ref 35.1–46.3)
EOSINOPHIL # BLD AUTO: 0.03 X10(3) UL (ref 0–0.7)
EOSINOPHIL NFR BLD AUTO: 0.3 %
ERYTHROCYTE [DISTWIDTH] IN BLOOD BY AUTOMATED COUNT: 13 % (ref 11–15)
GFR SERPLBLD BASED ON 1.73 SQ M-ARVRAT: 107 ML/MIN/1.73M2 (ref 60–?)
GLUCOSE BLD-MCNC: 98 MG/DL (ref 70–99)
HCT VFR BLD AUTO: 43.8 %
HGB BLD-MCNC: 14.3 G/DL
IMM GRANULOCYTES # BLD AUTO: 0.03 X10(3) UL (ref 0–1)
IMM GRANULOCYTES NFR BLD: 0.3 %
LIPASE SERPL-CCNC: 89 U/L (ref 73–393)
LYMPHOCYTES # BLD AUTO: 0.64 X10(3) UL (ref 1–4)
LYMPHOCYTES NFR BLD AUTO: 6.4 %
MCH RBC QN AUTO: 29.5 PG (ref 26–34)
MCHC RBC AUTO-ENTMCNC: 32.6 G/DL (ref 31–37)
MCV RBC AUTO: 90.5 FL
MONOCYTES # BLD AUTO: 0.31 X10(3) UL (ref 0.1–1)
MONOCYTES NFR BLD AUTO: 3.1 %
NEUTROPHILS # BLD AUTO: 8.95 X10 (3) UL (ref 1.5–7.7)
NEUTROPHILS # BLD AUTO: 8.95 X10(3) UL (ref 1.5–7.7)
NEUTROPHILS NFR BLD AUTO: 89.8 %
OSMOLALITY SERPL CALC.SUM OF ELEC: 294 MOSM/KG (ref 275–295)
PLATELET # BLD AUTO: 352 10(3)UL (ref 150–450)
POTASSIUM SERPL-SCNC: 3.1 MMOL/L (ref 3.5–5.1)
PROT SERPL-MCNC: 7.6 G/DL (ref 6.4–8.2)
RBC # BLD AUTO: 4.84 X10(6)UL
SARS-COV-2 RNA RESP QL NAA+PROBE: NOT DETECTED
SODIUM SERPL-SCNC: 141 MMOL/L (ref 136–145)
TROPONIN I HIGH SENSITIVITY: <3 NG/L
WBC # BLD AUTO: 10 X10(3) UL (ref 4–11)

## 2022-08-03 PROCEDURE — 76705 ECHO EXAM OF ABDOMEN: CPT | Performed by: NURSE PRACTITIONER

## 2022-08-03 PROCEDURE — 96375 TX/PRO/DX INJ NEW DRUG ADDON: CPT

## 2022-08-03 PROCEDURE — 80076 HEPATIC FUNCTION PANEL: CPT | Performed by: NURSE PRACTITIONER

## 2022-08-03 PROCEDURE — 99284 EMERGENCY DEPT VISIT MOD MDM: CPT

## 2022-08-03 PROCEDURE — 80048 BASIC METABOLIC PNL TOTAL CA: CPT

## 2022-08-03 PROCEDURE — 85025 COMPLETE CBC W/AUTO DIFF WBC: CPT

## 2022-08-03 PROCEDURE — 71045 X-RAY EXAM CHEST 1 VIEW: CPT | Performed by: NURSE PRACTITIONER

## 2022-08-03 PROCEDURE — 93005 ELECTROCARDIOGRAM TRACING: CPT

## 2022-08-03 PROCEDURE — 84484 ASSAY OF TROPONIN QUANT: CPT | Performed by: NURSE PRACTITIONER

## 2022-08-03 PROCEDURE — 93010 ELECTROCARDIOGRAM REPORT: CPT | Performed by: NURSE PRACTITIONER

## 2022-08-03 PROCEDURE — 96361 HYDRATE IV INFUSION ADD-ON: CPT

## 2022-08-03 PROCEDURE — 96374 THER/PROPH/DIAG INJ IV PUSH: CPT

## 2022-08-03 PROCEDURE — 81025 URINE PREGNANCY TEST: CPT

## 2022-08-03 PROCEDURE — 83690 ASSAY OF LIPASE: CPT | Performed by: NURSE PRACTITIONER

## 2022-08-03 RX ORDER — NICOTINE POLACRILEX 4 MG/1
20 GUM, CHEWING ORAL DAILY
Qty: 30 TABLET | Refills: 0 | Status: SHIPPED | OUTPATIENT
Start: 2022-08-03 | End: 2022-09-02

## 2022-08-03 RX ORDER — MORPHINE SULFATE 4 MG/ML
4 INJECTION, SOLUTION INTRAMUSCULAR; INTRAVENOUS ONCE
Status: COMPLETED | OUTPATIENT
Start: 2022-08-03 | End: 2022-08-03

## 2022-08-03 RX ORDER — ONDANSETRON 2 MG/ML
4 INJECTION INTRAMUSCULAR; INTRAVENOUS ONCE
Status: COMPLETED | OUTPATIENT
Start: 2022-08-03 | End: 2022-08-03

## 2022-08-03 NOTE — ED INITIAL ASSESSMENT (HPI)
Patient arrives ambulatory through triage with abdominal pain with vomiting, body aches, chills x1 day.

## 2022-08-11 ENCOUNTER — OFFICE VISIT (OUTPATIENT)
Dept: INTERNAL MEDICINE CLINIC | Facility: CLINIC | Age: 52
End: 2022-08-11
Payer: COMMERCIAL

## 2022-08-11 VITALS
BODY MASS INDEX: 34.17 KG/M2 | SYSTOLIC BLOOD PRESSURE: 112 MMHG | HEIGHT: 61 IN | WEIGHT: 181 LBS | HEART RATE: 65 BPM | DIASTOLIC BLOOD PRESSURE: 76 MMHG

## 2022-08-11 DIAGNOSIS — R10.9 ABDOMINAL PAIN, UNSPECIFIED ABDOMINAL LOCATION: Primary | ICD-10-CM

## 2022-08-11 PROCEDURE — 3074F SYST BP LT 130 MM HG: CPT | Performed by: INTERNAL MEDICINE

## 2022-08-11 PROCEDURE — 3078F DIAST BP <80 MM HG: CPT | Performed by: INTERNAL MEDICINE

## 2022-08-11 PROCEDURE — 99214 OFFICE O/P EST MOD 30 MIN: CPT | Performed by: INTERNAL MEDICINE

## 2022-08-11 PROCEDURE — 3008F BODY MASS INDEX DOCD: CPT | Performed by: INTERNAL MEDICINE

## 2022-09-13 ENCOUNTER — TELEPHONE (OUTPATIENT)
Dept: INTERNAL MEDICINE CLINIC | Facility: CLINIC | Age: 52
End: 2022-09-13

## 2022-09-13 NOTE — TELEPHONE ENCOUNTER
Pt is calling stating that has been having on and off headache for a week.       Please call and advise

## 2022-09-13 NOTE — TELEPHONE ENCOUNTER
Pt has known migraines. If she is persisting with these headaches despite medication, she needs to follow-up in office; ok to use SDA in a week or so. Please contact and assist patient with scheduling an appointment. Thank you.

## 2022-09-15 ENCOUNTER — HOSPITAL ENCOUNTER (EMERGENCY)
Facility: HOSPITAL | Age: 52
Discharge: HOME | End: 2022-09-15
Attending: EMERGENCY MEDICINE

## 2022-09-15 VITALS
DIASTOLIC BLOOD PRESSURE: 68 MMHG | OXYGEN SATURATION: 98 % | SYSTOLIC BLOOD PRESSURE: 108 MMHG | RESPIRATION RATE: 18 BRPM | TEMPERATURE: 98 F | HEART RATE: 63 BPM

## 2022-09-15 DIAGNOSIS — G43.109 MIGRAINE WITH AURA AND WITHOUT STATUS MIGRAINOSUS, NOT INTRACTABLE: Primary | ICD-10-CM

## 2022-09-15 LAB
ANION GAP SERPL CALC-SCNC: 7 MMOL/L (ref 0–18)
BASOPHILS # BLD AUTO: 0.03 X10(3) UL (ref 0–0.2)
BASOPHILS NFR BLD AUTO: 0.4 %
BUN BLD-MCNC: 13 MG/DL (ref 7–18)
BUN/CREAT SERPL: 21.3 (ref 10–20)
CALCIUM BLD-MCNC: 8.5 MG/DL (ref 8.5–10.1)
CHLORIDE SERPL-SCNC: 107 MMOL/L (ref 98–112)
CO2 SERPL-SCNC: 26 MMOL/L (ref 21–32)
CREAT BLD-MCNC: 0.61 MG/DL
DEPRECATED RDW RBC AUTO: 41.1 FL (ref 35.1–46.3)
EOSINOPHIL # BLD AUTO: 0.11 X10(3) UL (ref 0–0.7)
EOSINOPHIL NFR BLD AUTO: 1.4 %
ERYTHROCYTE [DISTWIDTH] IN BLOOD BY AUTOMATED COUNT: 12.6 % (ref 11–15)
GFR SERPLBLD BASED ON 1.73 SQ M-ARVRAT: 110 ML/MIN/1.73M2 (ref 60–?)
GLUCOSE BLD-MCNC: 106 MG/DL (ref 70–99)
HCT VFR BLD AUTO: 39.4 %
HGB BLD-MCNC: 13.1 G/DL
IMM GRANULOCYTES # BLD AUTO: 0.05 X10(3) UL (ref 0–1)
IMM GRANULOCYTES NFR BLD: 0.6 %
LYMPHOCYTES # BLD AUTO: 1.88 X10(3) UL (ref 1–4)
LYMPHOCYTES NFR BLD AUTO: 23.5 %
MCH RBC QN AUTO: 29.6 PG (ref 26–34)
MCHC RBC AUTO-ENTMCNC: 33.2 G/DL (ref 31–37)
MCV RBC AUTO: 89.1 FL
MONOCYTES # BLD AUTO: 0.4 X10(3) UL (ref 0.1–1)
MONOCYTES NFR BLD AUTO: 5 %
NEUTROPHILS # BLD AUTO: 5.52 X10 (3) UL (ref 1.5–7.7)
NEUTROPHILS # BLD AUTO: 5.52 X10(3) UL (ref 1.5–7.7)
NEUTROPHILS NFR BLD AUTO: 69.1 %
OSMOLALITY SERPL CALC.SUM OF ELEC: 291 MOSM/KG (ref 275–295)
PLATELET # BLD AUTO: 380 10(3)UL (ref 150–450)
POTASSIUM SERPL-SCNC: 4.5 MMOL/L (ref 3.5–5.1)
RBC # BLD AUTO: 4.42 X10(6)UL
SODIUM SERPL-SCNC: 140 MMOL/L (ref 136–145)
WBC # BLD AUTO: 8 X10(3) UL (ref 4–11)

## 2022-09-15 PROCEDURE — 99284 EMERGENCY DEPT VISIT MOD MDM: CPT

## 2022-09-15 PROCEDURE — 96361 HYDRATE IV INFUSION ADD-ON: CPT

## 2022-09-15 PROCEDURE — 96375 TX/PRO/DX INJ NEW DRUG ADDON: CPT

## 2022-09-15 PROCEDURE — 80048 BASIC METABOLIC PNL TOTAL CA: CPT

## 2022-09-15 PROCEDURE — 85025 COMPLETE CBC W/AUTO DIFF WBC: CPT

## 2022-09-15 PROCEDURE — 96374 THER/PROPH/DIAG INJ IV PUSH: CPT

## 2022-09-15 RX ORDER — ONDANSETRON 2 MG/ML
4 INJECTION INTRAMUSCULAR; INTRAVENOUS ONCE
Status: COMPLETED | OUTPATIENT
Start: 2022-09-15 | End: 2022-09-15

## 2022-09-15 RX ORDER — KETOROLAC TROMETHAMINE 15 MG/ML
15 INJECTION, SOLUTION INTRAMUSCULAR; INTRAVENOUS ONCE
Status: COMPLETED | OUTPATIENT
Start: 2022-09-15 | End: 2022-09-15

## 2022-09-15 RX ORDER — KETOROLAC TROMETHAMINE 10 MG/1
10 TABLET, FILM COATED ORAL EVERY 6 HOURS PRN
Qty: 15 TABLET | Refills: 0 | Status: SHIPPED | OUTPATIENT
Start: 2022-09-15 | End: 2022-09-22

## 2022-09-15 RX ORDER — ONDANSETRON 4 MG/1
4 TABLET, ORALLY DISINTEGRATING ORAL EVERY 4 HOURS PRN
Qty: 12 TABLET | Refills: 0 | Status: SHIPPED | OUTPATIENT
Start: 2022-09-15 | End: 2022-09-22

## 2022-09-17 ENCOUNTER — PATIENT MESSAGE (OUTPATIENT)
Dept: INTERNAL MEDICINE CLINIC | Facility: CLINIC | Age: 52
End: 2022-09-17

## 2022-09-19 RX ORDER — BUTALBITAL, ACETAMINOPHEN, CAFFEINE AND CODEINE PHOSPHATE 300; 50; 40; 30 MG/1; MG/1; MG/1; MG/1
1 CAPSULE ORAL 2 TIMES DAILY PRN
Qty: 20 CAPSULE | Refills: 0 | Status: SHIPPED | OUTPATIENT
Start: 2022-09-19

## 2022-09-19 NOTE — TELEPHONE ENCOUNTER
Pt reported that she'll be going to [facility] today. Wanted refill of migraine medication. This RN relayed to Kin Nathaniel whom answered the call that refill will be sent (do not take med daily) and patient needs to be seen upon her return. Has appt set up on 10/11 with Dr. Leticia Bradley.

## 2022-09-19 NOTE — TELEPHONE ENCOUNTER
From: Nikhil Miles  To: Lucas Saleh MD  Sent: 9/17/2022 9:03 PM CDT  Subject: Neck pain    Hi, I went to the ER Friday for migraine and neck pain. I've had the migraine for a week and I still feel the pain. I was wondering if I can get a refill for the medication or if you are able to see me Monday morning.

## 2022-10-03 ENCOUNTER — TELEPHONE (OUTPATIENT)
Dept: PHYSICAL MEDICINE AND REHAB | Facility: CLINIC | Age: 52
End: 2022-10-03

## 2022-10-03 ENCOUNTER — HOSPITAL ENCOUNTER (OUTPATIENT)
Dept: GENERAL RADIOLOGY | Facility: HOSPITAL | Age: 52
Discharge: HOME | End: 2022-10-03
Attending: PHYSICAL MEDICINE & REHABILITATION
Payer: COMMERCIAL

## 2022-10-03 ENCOUNTER — OFFICE VISIT (OUTPATIENT)
Dept: PHYSICAL MEDICINE AND REHAB | Facility: CLINIC | Age: 52
End: 2022-10-03
Payer: COMMERCIAL

## 2022-10-03 VITALS
DIASTOLIC BLOOD PRESSURE: 70 MMHG | OXYGEN SATURATION: 97 % | SYSTOLIC BLOOD PRESSURE: 120 MMHG | HEART RATE: 82 BPM | BODY MASS INDEX: 34.36 KG/M2 | WEIGHT: 182 LBS | HEIGHT: 61 IN

## 2022-10-03 DIAGNOSIS — M47.816 FACET SYNDROME, LUMBAR: ICD-10-CM

## 2022-10-03 DIAGNOSIS — M54.59 MECHANICAL LOW BACK PAIN: ICD-10-CM

## 2022-10-03 DIAGNOSIS — M47.816 LUMBAR SPONDYLOSIS: ICD-10-CM

## 2022-10-03 DIAGNOSIS — M51.16 LUMBAR DISC HERNIATION WITH RADICULOPATHY: ICD-10-CM

## 2022-10-03 DIAGNOSIS — R20.0 NUMBNESS IN BOTH HANDS: ICD-10-CM

## 2022-10-03 DIAGNOSIS — M48.061 LUMBAR FORAMINAL STENOSIS: ICD-10-CM

## 2022-10-03 DIAGNOSIS — M51.37 DDD (DEGENERATIVE DISC DISEASE), LUMBOSACRAL: ICD-10-CM

## 2022-10-03 DIAGNOSIS — M99.9 BIOMECHANICAL LESION: ICD-10-CM

## 2022-10-03 DIAGNOSIS — M54.2 TRIGGER POINT OF NECK: ICD-10-CM

## 2022-10-03 DIAGNOSIS — M77.02 MEDIAL EPICONDYLITIS OF ELBOW, LEFT: ICD-10-CM

## 2022-10-03 DIAGNOSIS — M54.2 NECK PAIN: ICD-10-CM

## 2022-10-03 DIAGNOSIS — M51.36 BULGE OF LUMBAR DISC WITHOUT MYELOPATHY: ICD-10-CM

## 2022-10-03 DIAGNOSIS — M77.00 MEDIAL EPICONDYLITIS OF ELBOW, UNSPECIFIED LATERALITY: ICD-10-CM

## 2022-10-03 DIAGNOSIS — M79.10 MYALGIA: ICD-10-CM

## 2022-10-03 DIAGNOSIS — M54.59 LUMBAR TRIGGER POINT SYNDROME: ICD-10-CM

## 2022-10-03 DIAGNOSIS — M77.02 MEDIAL EPICONDYLITIS OF ELBOW, LEFT: Primary | ICD-10-CM

## 2022-10-03 PROCEDURE — 72050 X-RAY EXAM NECK SPINE 4/5VWS: CPT | Performed by: PHYSICAL MEDICINE & REHABILITATION

## 2022-10-03 NOTE — PATIENT INSTRUCTIONS
1 My office will call you to schedule the L5-S1 ILESI under IVCS once the procedure is approved by your insurance carrier. 2 Start home exercise program for medial epicondylitis and neck pain  3) Follow up with me 2 weeks after the procedure  4) Please get X-rays of the Cervical spine today on your way out. 5) Continue Naprosyn.  Stop Meloxicam, gabapentin, and cyclobenzaprine  6) Start ice cup massage and home exercise program for the plantar fascitis

## 2022-10-03 NOTE — TELEPHONE ENCOUNTER
Initiated authorization for L5-S1 ILESI CPT 19109 dx:M51.16 to be done at [facility] with [employer] online  [facility], Nurse reviewer at [facility] who Approved request  Status: Approved w/ order #[id number] valid 10/26/22-11/24/22    pls schedule under IVCS as directed

## 2022-10-03 NOTE — TELEPHONE ENCOUNTER
Patient has been scheduled for  L5-S1 ILESI  on 10/26/22 at the [facility]  with Joel Oates. -Anesthesia type: IVCS. -If scheduling [facility] covid testing required for all procedures whether patient is vaccinated or not. -Patient informed not to eat or drink anything after midnight the night prior to the procedure, if being sedated. -Patient was advised that if he/she does receive the covid vaccine it needs to be at least 2 weeks before or after the injection. -Medications and allergies reviewed. -Patient reminded to hold NSAIDs (Ibuprofen, ASA 81, Aleve, Naproxen, Mobic, Diclofenac, Etodolac, Celebrex etc.) for 3 days prior to [facility]  if BMI is greater than 35. For Cervical injections only hold multivitamins, Vitamin E, Fish Oil, Phentermine (Lomaira) for 7 days prior to injection and NSAIDS.  mg to be held for 7 days prior to injections.  -If patient is receiving MAC/IVCS Phentermine Waushara Romberg) will need to be held for 7 days prior to injection.  -If on blood thinner clearance has been received to hold this medication by provider.   -Patient informed he/she will need a  to and from procedure. -[facility] is located in the [facility] 1st floor. Patient may park in the yellow parking. Patient verbalized understanding and agrees with plan.  -----> Scheduled in Epic: Yes  -----> Scheduled in [employer]:  Yes

## 2022-10-11 ENCOUNTER — OFFICE VISIT (OUTPATIENT)
Dept: INTERNAL MEDICINE CLINIC | Facility: CLINIC | Age: 52
End: 2022-10-11
Payer: COMMERCIAL

## 2022-10-11 VITALS
BODY MASS INDEX: 35 KG/M2 | HEART RATE: 69 BPM | WEIGHT: 184 LBS | SYSTOLIC BLOOD PRESSURE: 108 MMHG | DIASTOLIC BLOOD PRESSURE: 70 MMHG | TEMPERATURE: 98 F | OXYGEN SATURATION: 97 %

## 2022-10-11 DIAGNOSIS — G44.59 OTHER COMPLICATED HEADACHE SYNDROME: Primary | ICD-10-CM

## 2022-10-11 DIAGNOSIS — G89.29 OTHER CHRONIC PAIN: ICD-10-CM

## 2022-10-11 PROCEDURE — 3074F SYST BP LT 130 MM HG: CPT | Performed by: INTERNAL MEDICINE

## 2022-10-11 PROCEDURE — 90686 IIV4 VACC NO PRSV 0.5 ML IM: CPT | Performed by: INTERNAL MEDICINE

## 2022-10-11 PROCEDURE — 3078F DIAST BP <80 MM HG: CPT | Performed by: INTERNAL MEDICINE

## 2022-10-11 PROCEDURE — 99214 OFFICE O/P EST MOD 30 MIN: CPT | Performed by: INTERNAL MEDICINE

## 2022-10-11 RX ORDER — CYCLOBENZAPRINE HCL 10 MG
10 TABLET ORAL NIGHTLY PRN
Qty: 90 TABLET | Refills: 0 | Status: SHIPPED | OUTPATIENT
Start: 2022-10-11 | End: 2022-10-31

## 2022-10-11 RX ORDER — OMEPRAZOLE 20 MG/1
20 CAPSULE, DELAYED RELEASE ORAL DAILY
Qty: 90 CAPSULE | Refills: 0 | Status: SHIPPED | OUTPATIENT
Start: 2022-10-11

## 2022-10-11 RX ORDER — ROSUVASTATIN CALCIUM 40 MG/1
40 TABLET, COATED ORAL NIGHTLY
Qty: 120 TABLET | Refills: 0 | Status: SHIPPED | OUTPATIENT
Start: 2022-10-11

## 2022-10-11 RX ORDER — NAPROXEN 500 MG/1
500 TABLET ORAL 2 TIMES DAILY WITH MEALS
Qty: 60 TABLET | Refills: 1 | Status: SHIPPED | OUTPATIENT
Start: 2022-10-11

## 2022-10-26 ENCOUNTER — TELEPHONE (OUTPATIENT)
Dept: PHYSICAL MEDICINE AND REHAB | Facility: CLINIC | Age: 52
End: 2022-10-26

## 2022-10-26 ENCOUNTER — OFFICE VISIT (OUTPATIENT)
Dept: SURGERY | Facility: CLINIC | Age: 52
End: 2022-10-26

## 2022-10-26 DIAGNOSIS — M54.59 MECHANICAL LOW BACK PAIN: ICD-10-CM

## 2022-10-26 DIAGNOSIS — M51.16 LUMBAR DISC HERNIATION WITH RADICULOPATHY: ICD-10-CM

## 2022-10-26 DIAGNOSIS — M51.37 DDD (DEGENERATIVE DISC DISEASE), LUMBOSACRAL: Primary | ICD-10-CM

## 2022-10-26 DIAGNOSIS — M54.59 LUMBAR TRIGGER POINT SYNDROME: ICD-10-CM

## 2022-10-26 DIAGNOSIS — M47.816 FACET SYNDROME, LUMBAR: ICD-10-CM

## 2022-10-26 DIAGNOSIS — M51.36 BULGE OF LUMBAR DISC WITHOUT MYELOPATHY: ICD-10-CM

## 2022-10-26 DIAGNOSIS — M48.061 LUMBAR FORAMINAL STENOSIS: ICD-10-CM

## 2022-10-26 DIAGNOSIS — M47.816 LUMBAR SPONDYLOSIS: ICD-10-CM

## 2022-10-26 DIAGNOSIS — M54.16 LUMBAR RADICULOPATHY: ICD-10-CM

## 2022-10-26 PROCEDURE — 99152 MOD SED SAME PHYS/QHP 5/>YRS: CPT | Performed by: PHYSICAL MEDICINE & REHABILITATION

## 2022-10-26 PROCEDURE — 62323 NJX INTERLAMINAR LMBR/SAC: CPT | Performed by: PHYSICAL MEDICINE & REHABILITATION

## 2022-10-26 NOTE — PATIENT INSTRUCTIONS
Post Injection Instructions     Please do not do anything strenuous over the next 24 hours (if you had a knee injection do not walk more than 2 city blocks, do not attend any aerobic classes, do not run, no heavy lifting, no prolong standing). You may resume your day to day activities after your injection. You may experience some mild amount of swelling and discomfort after the procedure. Please ice the area that was injected at least 5-6 times a day (15 minutes) for two days after (this will help prevent worsening pain that sometimes occurs after an injection). Only take tylenol if needed for pain for the first few days. Watch for signs of infection which include redness, warmth, worsening pain, fevers or chills. If you develop any of these signs call the office immediately at [phone]    My office will call you in 2 days to check in and see how you are feeling. Follow up with me in the office 2 weeks after your injection. Everyone responds differently to injections, but you can expect your peak effects a few weeks after your last injection. Bhavya Clubs.  Manuel Celis MD  Physical Medicine and Rehabilitation/Sports Medicine  MEDICAL CENTER [facility]

## 2022-10-26 NOTE — TELEPHONE ENCOUNTER
Called patient for condition update and she states she is fine but feeling a bit dizzy when going from sit to stand position and feeling a bit nauseous. Patient advised to drink lots of water since she was under IVCS and call us tomorrow if her symptoms don't go away. Dr. Justa Scott notified.

## 2022-10-26 NOTE — PROCEDURES
[facility] [address]    LUMBAR INTERLAMINAR  NAME:  Paulie Kelly    MR #:    [id number]  :  1970     PHYSICIAN:  Elana Betancourt        Operative Report    DATE OF PROCEDURE: 2022     PREOPERATIVE DIAGNOSES: 1. DDD (degenerative disc disease), lumbosacral    2. Lumbar radiculopathy    3. Mechanical low back pain    4. Lumbar spondylosis    5. Lumbar foraminal stenosis    6. Bulge of lumbar disc without myelopathy    7. Lumbar disc herniation with radiculopathy    8. Facet syndrome, lumbar    9. Lumbar trigger point syndrome               POSTOPERATIVE DIAGNOSES:   1. DDD (degenerative disc disease), lumbosacral    2. Lumbar radiculopathy    3. Mechanical low back pain    4. Lumbar spondylosis    5. Lumbar foraminal stenosis    6. Bulge of lumbar disc without myelopathy    7. Lumbar disc herniation with radiculopathy    8. Facet syndrome, lumbar    9. Lumbar trigger point syndrome               PROCEDURES: Left L5-S1 interlaminar epidural steroid injection done under fluoroscopic guidance with contrast enhancement. SURGEON: Elana Betancourt   ANESTHESIA: IV conscious sedation   INDICATIONS:       OPERATIVE PROCEDURE:  Written consent was obtained from the patient. The patient was brought into the operating room and placed in the prone position on the fluoroscopy table with pillow underneath the chest and shoulders. The patient's skin was cleaned and draped in a normal sterile fashion. Using AP fluoroscopy, all 5 lumbar vertebrae were identified. The Left L5 lamina was identified. Skin was anesthetized with 1% PF lidocaine without epinephrine. Then, a 3-1/2 inch, 18-gauge Tuohy needle was inserted and directed towards the Left L5 lamina. When it was engaged, it was walked inferiorly and medially until it was felt to drop off the inferiormedial aspect. Then, Omnipaque-240 contrast was used to obtain a good epidurogram indicating correct needle placement.   Then, aspiration was performed. A small amount of pink fluid was seen coming out of the Touhy needle. The needle was then repositioned and the position was checked again with Omnipaque. A normal epidurogram was seen. Then, the patient was injected with a  9cc solution of 3 cc of normal sterile saline and 3 cc of 1% PF lidocaine without epinephrine, and 3 cc of 6 mg/cc of Celestone Soluspan. Then, the needle was removed. The patient's skin was cleaned. A Band-Aid was applied. The patient was transferred to the cart and into [facility]. The patient was given discharge instructions and will follow up in the clinic as scheduled. Throughout the whole procedure, the patient's pulse oximetry and vital signs were monitored and they remained completely stable. Also, throughout the whole procedure, prior to injection of any medication, aspiration was performed. No blood, fluid, or air was aspirated at anytime. Yessica Soto.  Dora Chandler MD, [address] [facility]  Physical Medicine and Rehabilitation/Sports Medicine  [facility]

## 2022-12-08 ENCOUNTER — OFFICE VISIT (OUTPATIENT)
Dept: PHYSICAL MEDICINE AND REHAB | Facility: CLINIC | Age: 52
End: 2022-12-08
Payer: COMMERCIAL

## 2022-12-08 ENCOUNTER — HOSPITAL ENCOUNTER (OUTPATIENT)
Dept: GENERAL RADIOLOGY | Facility: HOSPITAL | Age: 52
Discharge: HOME | End: 2022-12-08
Attending: PHYSICAL MEDICINE & REHABILITATION
Payer: COMMERCIAL

## 2022-12-08 VITALS — WEIGHT: 185 LBS | HEART RATE: 84 BPM | OXYGEN SATURATION: 97 % | BODY MASS INDEX: 35 KG/M2

## 2022-12-08 DIAGNOSIS — M79.10 MYALGIA: ICD-10-CM

## 2022-12-08 DIAGNOSIS — R20.0 NUMBNESS IN BOTH HANDS: ICD-10-CM

## 2022-12-08 DIAGNOSIS — M77.00 MEDIAL EPICONDYLITIS OF ELBOW, UNSPECIFIED LATERALITY: ICD-10-CM

## 2022-12-08 DIAGNOSIS — M25.521 BILATERAL ELBOW JOINT PAIN: ICD-10-CM

## 2022-12-08 DIAGNOSIS — M47.816 FACET SYNDROME, LUMBAR: ICD-10-CM

## 2022-12-08 DIAGNOSIS — M51.36 BULGE OF LUMBAR DISC WITHOUT MYELOPATHY: ICD-10-CM

## 2022-12-08 DIAGNOSIS — M72.2 PLANTAR FASCIITIS OF RIGHT FOOT: ICD-10-CM

## 2022-12-08 DIAGNOSIS — M48.061 LUMBAR FORAMINAL STENOSIS: ICD-10-CM

## 2022-12-08 DIAGNOSIS — M99.9 BIOMECHANICAL LESION: ICD-10-CM

## 2022-12-08 DIAGNOSIS — M54.59 LUMBAR TRIGGER POINT SYNDROME: ICD-10-CM

## 2022-12-08 DIAGNOSIS — M47.816 LUMBAR SPONDYLOSIS: ICD-10-CM

## 2022-12-08 DIAGNOSIS — M25.522 BILATERAL ELBOW JOINT PAIN: ICD-10-CM

## 2022-12-08 DIAGNOSIS — M54.59 MECHANICAL LOW BACK PAIN: ICD-10-CM

## 2022-12-08 DIAGNOSIS — M51.37 DDD (DEGENERATIVE DISC DISEASE), LUMBOSACRAL: ICD-10-CM

## 2022-12-08 DIAGNOSIS — E78.5 HYPERLIPIDEMIA, UNSPECIFIED HYPERLIPIDEMIA TYPE: ICD-10-CM

## 2022-12-08 DIAGNOSIS — M77.02 MEDIAL EPICONDYLITIS OF ELBOW, LEFT: ICD-10-CM

## 2022-12-08 DIAGNOSIS — M77.02 MEDIAL EPICONDYLITIS OF ELBOW, LEFT: Primary | ICD-10-CM

## 2022-12-08 DIAGNOSIS — M51.16 LUMBAR DISC HERNIATION WITH RADICULOPATHY: ICD-10-CM

## 2022-12-08 DIAGNOSIS — M54.2 NECK PAIN: ICD-10-CM

## 2022-12-08 PROCEDURE — 99214 OFFICE O/P EST MOD 30 MIN: CPT | Performed by: PHYSICAL MEDICINE & REHABILITATION

## 2022-12-08 PROCEDURE — 73630 X-RAY EXAM OF FOOT: CPT | Performed by: PHYSICAL MEDICINE & REHABILITATION

## 2022-12-08 RX ORDER — CYCLOBENZAPRINE HCL 5 MG
TABLET ORAL
Qty: 90 TABLET | Refills: 0 | Status: SHIPPED | OUTPATIENT
Start: 2022-12-08

## 2022-12-08 NOTE — PATIENT INSTRUCTIONS
1) Please call and schedule your MRI at [phone]. Once you have your MRI scheduled, then call my office again to schedule a follow-up visit soon after your MRI so we may review the images together. 2) Please get X-rays of the RIGHT foot today on your way out. 3) Take Naprosyn 500 mg 1 tablet twice per day with food for the next two weeks and then as needed but no more than 2 tablets per day. Do not take with any other NSAIDS (Ibuprofen, Advil, Aleve, etc). OK to take Tylenol 500 mg every 6 hours as needed for pain. If you develop any side effects including stomach aches, nausea, vomiting, or other gastrointestinal symptoms, stop the medication and call my office. 4) Start cyclobenzaprine 5 mg 1-2 tablets three times per day as needed for spasms. Do not operate heavy machinery while on this medication as it may make you sleepy. 5) Follow up with me to review the MRI of the lumbar spine and elbow.    6) Continue home exercise program

## 2023-01-05 ENCOUNTER — HOSPITAL ENCOUNTER (OUTPATIENT)
Dept: MRI IMAGING | Facility: HOSPITAL | Age: 53
Discharge: HOME | End: 2023-01-05
Attending: PHYSICAL MEDICINE & REHABILITATION
Payer: COMMERCIAL

## 2023-01-05 ENCOUNTER — HOSPITAL ENCOUNTER (OUTPATIENT)
Dept: MRI IMAGING | Facility: HOSPITAL | Age: 53
End: 2023-01-05
Attending: PHYSICAL MEDICINE & REHABILITATION
Payer: COMMERCIAL

## 2023-01-05 ENCOUNTER — HOSPITAL ENCOUNTER (OUTPATIENT)
Dept: MAMMOGRAPHY | Facility: HOSPITAL | Age: 53
Discharge: HOME | End: 2023-01-05
Attending: INTERNAL MEDICINE
Payer: COMMERCIAL

## 2023-01-05 DIAGNOSIS — M72.2 PLANTAR FASCIITIS OF RIGHT FOOT: ICD-10-CM

## 2023-01-05 DIAGNOSIS — M51.36 BULGE OF LUMBAR DISC WITHOUT MYELOPATHY: ICD-10-CM

## 2023-01-05 DIAGNOSIS — M47.816 FACET SYNDROME, LUMBAR: ICD-10-CM

## 2023-01-05 DIAGNOSIS — M77.00 MEDIAL EPICONDYLITIS OF ELBOW, UNSPECIFIED LATERALITY: ICD-10-CM

## 2023-01-05 DIAGNOSIS — M25.522 BILATERAL ELBOW JOINT PAIN: ICD-10-CM

## 2023-01-05 DIAGNOSIS — M48.061 LUMBAR FORAMINAL STENOSIS: ICD-10-CM

## 2023-01-05 DIAGNOSIS — E78.5 HYPERLIPIDEMIA, UNSPECIFIED HYPERLIPIDEMIA TYPE: ICD-10-CM

## 2023-01-05 DIAGNOSIS — M25.521 BILATERAL ELBOW JOINT PAIN: ICD-10-CM

## 2023-01-05 DIAGNOSIS — M77.02 MEDIAL EPICONDYLITIS OF ELBOW, LEFT: ICD-10-CM

## 2023-01-05 DIAGNOSIS — M79.10 MYALGIA: ICD-10-CM

## 2023-01-05 DIAGNOSIS — M54.2 NECK PAIN: ICD-10-CM

## 2023-01-05 DIAGNOSIS — Z12.31 SCREENING MAMMOGRAM, ENCOUNTER FOR: ICD-10-CM

## 2023-01-05 DIAGNOSIS — R20.0 NUMBNESS IN BOTH HANDS: ICD-10-CM

## 2023-01-05 DIAGNOSIS — M99.9 BIOMECHANICAL LESION: ICD-10-CM

## 2023-01-05 DIAGNOSIS — M54.59 MECHANICAL LOW BACK PAIN: ICD-10-CM

## 2023-01-05 DIAGNOSIS — M51.16 LUMBAR DISC HERNIATION WITH RADICULOPATHY: ICD-10-CM

## 2023-01-05 DIAGNOSIS — M54.59 LUMBAR TRIGGER POINT SYNDROME: ICD-10-CM

## 2023-01-05 DIAGNOSIS — M47.816 LUMBAR SPONDYLOSIS: ICD-10-CM

## 2023-01-05 DIAGNOSIS — M51.37 DDD (DEGENERATIVE DISC DISEASE), LUMBOSACRAL: ICD-10-CM

## 2023-01-05 PROCEDURE — 77067 SCR MAMMO BI INCL CAD: CPT | Performed by: INTERNAL MEDICINE

## 2023-01-05 PROCEDURE — 77063 BREAST TOMOSYNTHESIS BI: CPT | Performed by: INTERNAL MEDICINE

## 2023-01-05 PROCEDURE — 73221 MRI JOINT UPR EXTREM W/O DYE: CPT | Performed by: PHYSICAL MEDICINE & REHABILITATION

## 2023-01-16 ENCOUNTER — TELEPHONE (OUTPATIENT)
Dept: PHYSICAL MEDICINE AND REHAB | Facility: CLINIC | Age: 53
End: 2023-01-16

## 2023-01-16 ENCOUNTER — OFFICE VISIT (OUTPATIENT)
Dept: PHYSICAL MEDICINE AND REHAB | Facility: CLINIC | Age: 53
End: 2023-01-16
Payer: COMMERCIAL

## 2023-01-16 VITALS
HEIGHT: 61 IN | WEIGHT: 184 LBS | DIASTOLIC BLOOD PRESSURE: 74 MMHG | HEART RATE: 80 BPM | SYSTOLIC BLOOD PRESSURE: 120 MMHG | BODY MASS INDEX: 34.74 KG/M2 | OXYGEN SATURATION: 97 %

## 2023-01-16 DIAGNOSIS — S46.912A ELBOW STRAIN, LEFT, INITIAL ENCOUNTER: ICD-10-CM

## 2023-01-16 DIAGNOSIS — G89.29 ELBOW PAIN, CHRONIC, RIGHT: Primary | ICD-10-CM

## 2023-01-16 DIAGNOSIS — M25.521 ELBOW PAIN, CHRONIC, RIGHT: Primary | ICD-10-CM

## 2023-01-16 DIAGNOSIS — M72.2 PLANTAR FASCIITIS OF RIGHT FOOT: ICD-10-CM

## 2023-01-16 DIAGNOSIS — M77.02 MEDIAL EPICONDYLITIS OF ELBOW, LEFT: ICD-10-CM

## 2023-01-16 PROBLEM — S56.912A ELBOW STRAIN, LEFT, INITIAL ENCOUNTER: Status: ACTIVE | Noted: 2023-01-16

## 2023-01-16 PROCEDURE — 3078F DIAST BP <80 MM HG: CPT | Performed by: PHYSICAL MEDICINE & REHABILITATION

## 2023-01-16 PROCEDURE — 3074F SYST BP LT 130 MM HG: CPT | Performed by: PHYSICAL MEDICINE & REHABILITATION

## 2023-01-16 PROCEDURE — 99214 OFFICE O/P EST MOD 30 MIN: CPT | Performed by: PHYSICAL MEDICINE & REHABILITATION

## 2023-01-16 PROCEDURE — 3008F BODY MASS INDEX DOCD: CPT | Performed by: PHYSICAL MEDICINE & REHABILITATION

## 2023-01-16 RX ORDER — HYDROCODONE BITARTRATE AND ACETAMINOPHEN 5; 325 MG/1; MG/1
1 TABLET ORAL EVERY 6 HOURS PRN
Qty: 30 TABLET | Refills: 0 | Status: SHIPPED | OUTPATIENT
Start: 2023-01-16

## 2023-01-16 NOTE — PATIENT INSTRUCTIONS
1) My office will call you to schedule the LEFT medial epicondyle PRP injection under ultrasound guidance once the procedure is approved by your insurance carrier. 2) No NSAIDS 2 weeks before or after the procedure  3) Start physical therapy 1 week after the procedure  4) Norco 10/325 every 6 hours as needed for pain. Take 1 tablet 1 hour prior to procedure  5) OK to use ice before and after procedure and can also use tylenol before or after procedure  6) Purchase sling and use for 1 week after the procedure.    7) For the plantar fascitis, continue ice massage with frozen lemon or water bottle as we discussed

## 2023-01-16 NOTE — TELEPHONE ENCOUNTER
Pt. informed insurance denied coverage. The cost of LEFT medial epicondyle and common flexor tendon PRP under ultrasound guidance s $705. Payment is expected at time of service. She agreed.  Scheduled for procedure on 02/09/23 at 4:40 pm.

## 2023-01-16 NOTE — TELEPHONE ENCOUNTER
Initiated authorization for LEFT medical epicondyle and common flexor tendon PRP under ultrasound guidance CPT 0232T with [employer]  Transaction ID: [id number]  Status: Denied-not a covered benefit/considered experimental/investigational

## 2023-02-01 ENCOUNTER — OFFICE VISIT (OUTPATIENT)
Dept: INTERNAL MEDICINE CLINIC | Facility: CLINIC | Age: 53
End: 2023-02-01
Payer: COMMERCIAL

## 2023-02-01 VITALS
OXYGEN SATURATION: 100 % | WEIGHT: 184 LBS | HEART RATE: 87 BPM | BODY MASS INDEX: 35 KG/M2 | DIASTOLIC BLOOD PRESSURE: 70 MMHG | SYSTOLIC BLOOD PRESSURE: 106 MMHG | TEMPERATURE: 98 F

## 2023-02-01 DIAGNOSIS — K21.9 GASTROESOPHAGEAL REFLUX DISEASE, UNSPECIFIED WHETHER ESOPHAGITIS PRESENT: ICD-10-CM

## 2023-02-01 DIAGNOSIS — M54.50 LUMBAR BACK PAIN: Primary | ICD-10-CM

## 2023-02-01 PROCEDURE — 99214 OFFICE O/P EST MOD 30 MIN: CPT | Performed by: INTERNAL MEDICINE

## 2023-02-01 PROCEDURE — 3074F SYST BP LT 130 MM HG: CPT | Performed by: INTERNAL MEDICINE

## 2023-02-01 PROCEDURE — 3078F DIAST BP <80 MM HG: CPT | Performed by: INTERNAL MEDICINE

## 2023-02-01 RX ORDER — OMEPRAZOLE 20 MG/1
20 CAPSULE, DELAYED RELEASE ORAL DAILY
Qty: 90 CAPSULE | Refills: 0 | Status: SHIPPED | OUTPATIENT
Start: 2023-02-01

## 2023-02-09 ENCOUNTER — OFFICE VISIT (OUTPATIENT)
Dept: PHYSICAL MEDICINE AND REHAB | Facility: CLINIC | Age: 53
End: 2023-02-09
Payer: COMMERCIAL

## 2023-02-09 DIAGNOSIS — G89.29 ELBOW PAIN, CHRONIC, RIGHT: ICD-10-CM

## 2023-02-09 DIAGNOSIS — M25.521 ELBOW PAIN, CHRONIC, RIGHT: ICD-10-CM

## 2023-02-09 DIAGNOSIS — S46.912A ELBOW STRAIN, LEFT, INITIAL ENCOUNTER: ICD-10-CM

## 2023-02-09 DIAGNOSIS — M77.02 MEDIAL EPICONDYLITIS OF ELBOW, LEFT: Primary | ICD-10-CM

## 2023-02-09 PROCEDURE — 0232T NJX PLATELET PLASMA: CPT | Performed by: PHYSICAL MEDICINE & REHABILITATION

## 2023-02-09 RX ORDER — LIDOCAINE HYDROCHLORIDE 10 MG/ML
5 INJECTION, SOLUTION INFILTRATION; PERINEURAL ONCE
Status: SHIPPED | OUTPATIENT
Start: 2023-02-09

## 2023-02-20 ENCOUNTER — TELEPHONE (OUTPATIENT)
Dept: PHYSICAL THERAPY | Facility: HOSPITAL | Age: 53
End: 2023-02-20

## 2023-02-22 ENCOUNTER — OFFICE VISIT (OUTPATIENT)
Dept: OCCUPATIONAL MEDICINE | Facility: HOSPITAL | Age: 53
End: 2023-02-22
Attending: PHYSICAL MEDICINE & REHABILITATION
Payer: COMMERCIAL

## 2023-02-22 DIAGNOSIS — M72.2 PLANTAR FASCIITIS OF RIGHT FOOT: ICD-10-CM

## 2023-02-22 DIAGNOSIS — M25.521 ELBOW PAIN, CHRONIC, RIGHT: ICD-10-CM

## 2023-02-22 DIAGNOSIS — M77.02 MEDIAL EPICONDYLITIS OF ELBOW, LEFT: ICD-10-CM

## 2023-02-22 DIAGNOSIS — S46.912A ELBOW STRAIN, LEFT, INITIAL ENCOUNTER: ICD-10-CM

## 2023-02-22 DIAGNOSIS — G89.29 ELBOW PAIN, CHRONIC, RIGHT: ICD-10-CM

## 2023-02-22 PROCEDURE — 97166 OT EVAL MOD COMPLEX 45 MIN: CPT | Performed by: OCCUPATIONAL THERAPIST

## 2023-02-22 PROCEDURE — 97110 THERAPEUTIC EXERCISES: CPT | Performed by: OCCUPATIONAL THERAPIST

## 2023-02-22 NOTE — PROGRESS NOTES
OCCUPATIONAL THERAPY UPPER EXTREMITY EVALUATION:   Referring Physician: Charisma Medley  Date of onset: Feb.2022  Diagnosis:  Elbow pain, chronic, right (M25.521,G89.29)  Medial epicondylitis of elbow, left (M77.02)  Elbow strain, left, initial encounter (B69.222E)  Plantar fasciitis of right foot (M72.2)   Date of Service: 02/22/23  Date of Surgery: PRP procedure left elbow 02/09/23     PATIENT SUMMARY:   Whit Obrien is a 50year old y/o Mongolian speaking female who presents to therapy today with complaints of bilateral medial elbow pain with gripping. Pt describes pain level: 7/10 pain in left elbow and 6/10 in right elbow  History of current condition: Patient reports she began experiencing pain in bilateral medial elbows beginning about 1 year ago. She can not attribute the increase in pain to a particular event. Pain became progressively worse. She was referred to Physiatrist who ordered MRI which indicated tendonosis. Patient underwent PRP injection in left medial elbow   Current functional limitations include: carry bag of groceries, opening containers, sweeping  Jose Morel  describes prior level of function: using both hands independently for all self care, work and homemaking tasks  Employment: Temporary leave  Hand Dominance: right  Living Situation: w/ spouse  Imaging/Tests: MRI Of elbow results from   Mild tendinosis of the common flexor tendon (\"medial epicondylitis\"). Pt goals include: to reduce pain in both elbows so she can resume working  Past medical history was reviewed with Jose Morel. Significant findings include: GERD, hypercholesteremia        ASSESSMENT:   Whit Obrien is a pleasant middle age French speaking woman who came to therapy accompanied by her son who assisted in interpreting. Jose Morel complains of bilateral medial elbow pain which increases with elbow extension and gripping. She works in housekeeping, lives with her  in a house. Also will babysit her 3 yr old grandson on occasion. Assessment indicates decreased active bilateral wrist flexion and bilateral weakness in  strength. She is positive bilaterally for the following provocative tests: Tenderness at medial epicondyle, Resisted wrist flexion, Resisted digit flexion and Resisted pronation. Given the above noted deficits in ROM, pain, and strength, patient presents with impairments in occupation- based task performance for self care skills, work and leisure skills. Thomas Proctor could benefit from continued skilled OT to address the subcomponents of ROM, strength and pain to allow her the ability to regain above- noted skills. Based on clinical rationale this evaluation involved  Moderate complexity decision making due to 3 performance deficits, as well as no modifications of tasks or assistance. Also involves a brief review of occupational profile and medical and therapy history. Precautions: None        OBJECTIVE:   OBSERVATION: Patient see for OT Evaluation treatment and HEP instruction. ORTHOTICS: bilateral wrist orthosis      CIRCUMFERENTIAL EDEMA (cm):  Right Elbow crease: 28.5  Left Elbow crease: 28.5    AROM: bilateral shoulder, elbow and forearm are WNL  Wrist   Flexion: R 65, L 60  Extension: R 70, L 65       DIGIT ROM: Bilateral digit WHITMAN are WNL      Strength (lbs) Right            Trial          1           2            3          Average Left               Trial           1           2            3          Average   : *23       *5         2 pt Pinch:                 3 pt Pinch: 14       10         Lateral Pinch: 16       14           *only one trial performed due to pain    Today's Treatment: Patient education provided on diagnosis, POC and HEP.  Pt was instructed in and issued a HEP for:   Date 02/22/23                 Visit # 1                                    Evaluation x                Manual                   STM medial elbows 5 min                                                         Ther ex Prayer stretch 10 sec x 5                 Elbow active flexion/ext x10                  self wrist flexion stretch  10 sec x 5                                                                                                 HEP instruction   See below                                     Therapeutic Activity                                       Neuromuscular Re-education                                                             Modalities                    Moist heat  3 min                                           Charges: OT Eval x1, TE    Total Timed Treatment: 15 min     Total Treatment Time: 45 min       PLAN OF CARE:   Goals:      Pt complaints of pain in medial elbows will decrease at worst to 2/10. Pt will be independent and compliant with comprehensive HEP to maintain progress achieved in OT. Patient will demonstrate increase in left  strength to at least 25 lbs for ease in picking up cleaning bucket. Patient will demonstrate increase in left wrist WHITMAN by 10 degrees for ease in opening jars. Frequency / Duration: Patient will be seen for 2 x/week or a total of 8 visits over a 60 day period. Treatment will include: Manual Therapy, Soft tissue mobilization, AROM, PROM, Strengthening, Therapeutic Activity, Moist heat, cryotherapy, Ultrasound, Whirlpool (fluidotherapy), Paraffin, Electrical Stim, Orthosis,  Neuromuscular Re-education, Patient education, Home exercise program.    Education or treatment limitation: None  Rehab Potential:good     Quick Dash: 70  (normal population 11)  Patient was advised of these findings, precautions, and treatment options and has agreed to actively participate in planning and for this course of care. Thank you for your referral. Please co-sign or sign and return this letter via fax as soon as possible to [phone].  If you have any questions, please contact me at Dept: [phone]    Sincerely,  Electronically signed by therapist: PAOLA Calloway/ARTEMIO, T    Physician's certification required: Yes  I certify the need for these services furnished under this plan of treatment and while under my care.         X______________________________________________ Date________________  Certification From: 83/22/65      To: 04/21/23

## 2023-03-01 ENCOUNTER — APPOINTMENT (OUTPATIENT)
Dept: OCCUPATIONAL MEDICINE | Facility: HOSPITAL | Age: 53
End: 2023-03-01
Attending: PHYSICAL MEDICINE & REHABILITATION
Payer: COMMERCIAL

## 2023-03-03 ENCOUNTER — OFFICE VISIT (OUTPATIENT)
Dept: OCCUPATIONAL MEDICINE | Facility: HOSPITAL | Age: 53
End: 2023-03-03
Attending: PHYSICAL MEDICINE & REHABILITATION
Payer: COMMERCIAL

## 2023-03-03 PROCEDURE — 97110 THERAPEUTIC EXERCISES: CPT | Performed by: OCCUPATIONAL THERAPIST

## 2023-03-03 NOTE — PROGRESS NOTES
Dx:     Elbow pain, chronic, right (M25.521,G89.29)  Medial epicondylitis of elbow, left (M77.02)  Elbow strain, left, initial encounter (K58.043K)  Plantar fasciitis of right foot (M72.2)      Authorized # of Visits:  8        Next MD visit: none scheduled  Fall Risk: standard         Precautions: None          Medication Changes since last visit?: No  Subjective: Patient reports less pain in left elbow today, down to 3/10 with movement.     Objective: Treatment began with moist heat follow by IASTM along flexors, elbow AROM, wrist AROM, functional reaching    Date 02/22/23 03/03/23               Visit # 1  2                                  Evaluation x                Manual                   STM medial elbows 5 min  IASTM of forearm flexors 5 min                                                       Ther ex                   Prayer stretch 10 sec x 5  Pulley overhead stretch x 20               Elbow active flexion/ext x10  yellow swiss ball  Zig zag 20 feet across wall x 20                self wrist flexion stretch  10 sec x 5  red web stretch 10 sec x 5, flex/ext                dowel nate 2# wt, elbow flex/ext, elbow extension stretch    x10                functional reaching into cabinet for cones    x28               Intrinsic hand strengthening exercise with rubber band  x20         Red putty, IP , pinch, pull  5 min          two point pinch to remove velcro checkers    x40               Pronation/supination with 1 wt   x20          three point pinch of green clothespin to  velcro checkers    x40               HEP instruction   See below  Reviewed and issued intrinsic hand strengthening exercis with rubber band x20, three times/day                                   Therapeutic Activity                                       Neuromuscular Re-education                                                             Modalities                    Moist heat  3 min  3 min Assessment: Introduced reaching and grasping activities in clinic today. Patient reporting mild pain in medial elbow with gripping activities. Reviewed and issued intrinsic hand rubber band strengthening exercises as HEP. Goals:   Pt complaints of pain in medial elbows will decrease at worst to 2/10. Pt will be independent and compliant with comprehensive HEP to maintain progress achieved in OT. Patient will demonstrate increase in left  strength to at least 25 lbs for ease in picking up cleaning bucket. Patient will demonstrate increase in left wrist WHITMAN by 10 degrees for ease in opening jars. Plan: Continue to work towards managing pain, increase wrist AROM and strengthen.       Charges: TE3 Total Timed Treatment: 40 min  Total Treatment Time: 45 min

## 2023-03-07 ENCOUNTER — OFFICE VISIT (OUTPATIENT)
Dept: OCCUPATIONAL MEDICINE | Facility: HOSPITAL | Age: 53
End: 2023-03-07
Attending: PHYSICAL MEDICINE & REHABILITATION
Payer: COMMERCIAL

## 2023-03-07 PROCEDURE — 97140 MANUAL THERAPY 1/> REGIONS: CPT | Performed by: OCCUPATIONAL THERAPIST

## 2023-03-07 PROCEDURE — 97035 APP MDLTY 1+ULTRASOUND EA 15: CPT | Performed by: OCCUPATIONAL THERAPIST

## 2023-03-07 PROCEDURE — 97110 THERAPEUTIC EXERCISES: CPT | Performed by: OCCUPATIONAL THERAPIST

## 2023-03-07 NOTE — PROGRESS NOTES
Dx:     Elbow pain, chronic, right (M25.521,G89.29)  Medial epicondylitis of elbow, left (M77.02)  Elbow strain, left, initial encounter (W67.092C)  Plantar fasciitis of right foot (M72.2)      Authorized # of Visits:  8        Next MD visit: none scheduled  Fall Risk: standard         Precautions: None          Medication Changes since last visit?: No  Subjective: Patient complains of increased pain in left elbow as well as swelling after performing rubber band exercises    Objective: Treatment began with moist heat follow by IASTM along flexors, elbow AROM, wrist AROM, functional reaching, ultrasound pulsed, see flow sheet for details.     Date 02/22/23 03/03/23 03/07/23             Visit # 1  2  3                                Evaluation x                Manual                   STM medial elbows 5 min  IASTM of forearm flexors 5 min  IASTM of forearm flexors 5 min              Passive wrist extension with elbow extended in neutral and supinated      10 sec x 5             kinesiotape            MFR left UE in supine      6 min             Ther ex                   Prayer stretch 10 sec x 5  Pulley overhead stretch x 20  exerstick  2 min             Elbow active flexion/ext x10  yellow swiss ball  Zig zag 20 feet across wall x 20  yellow swiss ball  Zig zag 20 feet across wall x 20              self wrist flexion stretch  10 sec x 5  red web stretch 10 sec x 5, flex/ext  pronaiton/supination with 0 wt x 20              dowel nate 2# wt, elbow flex/ext, elbow extension stretch    x10                functional reaching into cabinet for cones    x28               Intrinsic hand strengthening exercise with rubber band  x20         Red putty, IP , pinch, pull  5 min          two point pinch to remove velcro checkers    x40               Pronation/supination with 1 wt   x20 x20         three point pinch of green clothespin to  velcro checkers    x40               HEP instruction   See below  Reviewed and issued intrinsic hand strengthening exercis with rubber band x20, three times/day                                   Therapeutic Activity                                       Neuromuscular Re-education                                                             Modalities                    Moist heat  3 min  3 min  3 min              Pulsed Ultrasound 50%, 3.0 MHZ, 1.2 W/CM2      8 min                 Assessment: Patient reports more pain after last session, also has returned to work but feels rubberband exercises provoked pain. Instruct patient to avoid rubber band exercises for now, downgrade resistive exercises today, focus on stretching and pain relief. Goals:   Pt complaints of pain in medial elbows will decrease at worst to 2/10. Pt will be independent and compliant with comprehensive HEP to maintain progress achieved in OT. Patient will demonstrate increase in left  strength to at least 25 lbs for ease in picking up cleaning bucket. Patient will demonstrate increase in left wrist WHITMAN by 10 degrees for ease in opening jars. Plan: Continue to work towards managing pain, increase wrist AROM and strengthen.       Charges: MT, TE,US Total Timed Treatment: 40 min  Total Treatment Time: 45 min

## 2023-03-10 ENCOUNTER — OFFICE VISIT (OUTPATIENT)
Dept: OCCUPATIONAL MEDICINE | Facility: HOSPITAL | Age: 53
End: 2023-03-10
Attending: PHYSICAL MEDICINE & REHABILITATION
Payer: COMMERCIAL

## 2023-03-10 PROCEDURE — 97140 MANUAL THERAPY 1/> REGIONS: CPT | Performed by: OCCUPATIONAL THERAPIST

## 2023-03-10 PROCEDURE — 97110 THERAPEUTIC EXERCISES: CPT | Performed by: OCCUPATIONAL THERAPIST

## 2023-03-10 PROCEDURE — 97035 APP MDLTY 1+ULTRASOUND EA 15: CPT | Performed by: OCCUPATIONAL THERAPIST

## 2023-03-10 NOTE — PROGRESS NOTES
Dx:     Elbow pain, chronic, right (M25.521,G89.29)  Medial epicondylitis of elbow, left (M77.02)  Elbow strain, left, initial encounter (L33.784Y)  Plantar fasciitis of right foot (M72.2)      Authorized # of Visits:  8        Next MD visit: none scheduled  Fall Risk: standard         Precautions: None          Medication Changes since last visit?: No  Subjective: \"Not so much pain today, better,\"  4/10    Objective: Treatment began with moist heat follow by IASTM along flexors, elbow AROM, wrist AROM, functional reaching, ultrasound pulsed, see flow sheet for details.     Date 02/22/23  03/03/23  03/07/23  03/10/23           Visit # 1  2  3  4                              Evaluation x                Manual                   STM medial elbows 5 min  IASTM of forearm flexors 5 min  IASTM of forearm flexors 5 min  IASTM of forearm flexors 5 min            Passive wrist extension with elbow extended in neutral and supinated      10 sec x 5  10sec x 5           kinesiotape            MFR left UE in supine      6 min             Ther ex                   Prayer stretch 10 sec x 5  Pulley overhead stretch x 20  exerstick  2 min  exerstick  2 min           Elbow active flexion/ext x10  yellow swiss ball  Zig zag 20 feet across wall x 20  yellow swiss ball  Zig zag 20 feet across wall x 20  yellow swiss ball  Zig zag 20 feet across wall x 20            self wrist flexion stretch  10 sec x 5  red web stretch 10 sec x 5, flex/ext  pronaiton/supination with 0 wt x 20  red web stretch 10 sec x 5, flex/ext            dowel nate 2# wt, elbow flex/ext, elbow extension stretch    x10                functional reaching into cabinet for cones    x28               Intrinsic hand strengthening exercise with rubber band  x20         Red putty, IP , pinch, pull  5 min  5 min        two point pinch to remove velcro checkers    x40    x40           Pronation/supination with 1 wt   x20 x20 x20        three point pinch of green clothespin to  velcro checkers    x40    x40           HEP instruction   See below  Reviewed and issued intrinsic hand strengthening exercis with rubber band x20, three times/day                                   Therapeutic Activity                                       Neuromuscular Re-education                                                             Modalities                    Moist heat  3 min  3 min  3 min  3 mi            Pulsed Ultrasound 50%, 3.0 MHZ, 1.2 W/CM2      8 min  8 min               Assessment: Patient reports significant decrease in pain after last session. Upgrade resistive exercises today, reporting mild median pain. Reapply kinesiotape along forearm flexors. Goals:   Pt complaints of pain in medial elbows will decrease at worst to 2/10. Pt will be independent and compliant with comprehensive HEP to maintain progress achieved in OT. Patient will demonstrate increase in left  strength to at least 25 lbs for ease in picking up cleaning bucket. Patient will demonstrate increase in left wrist WHITMAN by 10 degrees for ease in opening jars. Plan: Continue to work towards managing pain, increase wrist AROM and strengthen.       Charges: MT, TE,US Total Timed Treatment: 40 min  Total Treatment Time: 45 min

## 2023-03-14 ENCOUNTER — OFFICE VISIT (OUTPATIENT)
Dept: OCCUPATIONAL MEDICINE | Facility: HOSPITAL | Age: 53
End: 2023-03-14
Attending: PHYSICAL MEDICINE & REHABILITATION
Payer: COMMERCIAL

## 2023-03-14 PROCEDURE — 97110 THERAPEUTIC EXERCISES: CPT | Performed by: OCCUPATIONAL THERAPIST

## 2023-03-14 PROCEDURE — 97035 APP MDLTY 1+ULTRASOUND EA 15: CPT | Performed by: OCCUPATIONAL THERAPIST

## 2023-03-14 PROCEDURE — 97140 MANUAL THERAPY 1/> REGIONS: CPT | Performed by: OCCUPATIONAL THERAPIST

## 2023-03-14 NOTE — PROGRESS NOTES
Dx:     Elbow pain, chronic, right (M25.521,G89.29)  Medial epicondylitis of elbow, left (M77.02)  Elbow strain, left, initial encounter (D77.315H)  Plantar fasciitis of right foot (M72.2)      Authorized # of Visits:  8        Next MD visit: none scheduled  Fall Risk: standard         Precautions: None          Medication Changes since last visit?: No  Subjective: \"More pain in my elbow today, therapy not helping that much. \"    Objective: Treatment began with moist heat follow by IASTM along flexors, elbow AROM, wrist AROM, functional reaching, ultrasound pulsed, see flow sheet for details.       Date 02/22/23  03/03/23  03/07/23  03/10/23  03/14/23         Visit # 1  2  3  4  5/8                            Evaluation x                Manual                   STM medial elbows 5 min  IASTM of forearm flexors 5 min  IASTM of forearm flexors 5 min  IASTM of forearm flexors 5 min  IASTM of forearm flexors 8 min          Passive wrist extension with elbow extended in neutral and supinated      10 sec x 5  10sec x 5  10 sec x 5         kinesiotape            MFR left UE in supine      6 min             Ther ex                   Prayer stretch 10 sec x 5  Pulley overhead stretch x 20  exerstick  2 min  exerstick  2 min  flexbar wrist flex/ext x 20         Elbow active flexion/ext x10  yellow swiss ball  Zig zag 20 feet across wall x 20  yellow swiss ball  Zig zag 20 feet across wall x 20  yellow swiss ball  Zig zag 20 feet across wall x 20            self wrist flexion stretch  10 sec x 5  red web stretch 10 sec x 5, flex/ext  pronaiton/supination with 0 wt x 20  red web stretch 10 sec x 5, flex/ext  Blue web wrist flexor stretches 10 sec x 10          dowel nate 2# wt, elbow flex/ext, elbow extension stretch    x10      wrist ext, UD/RD PRE 2# x 15          functional reaching into cabinet for cones    x28      wrist flexion  eccentric PRE with 2# wt          Intrinsic hand strengthening exercise with rubber band  x20 Red putty, IP , pinch, pull  5 min  5 min 5 min       two point pinch to remove velcro checkers    x40    x40           Pronation/supination with 1 wt   x20 x20 x20 Eccentric        three point pinch of green clothespin to  velcro checkers    x40    x40  x40         HEP instruction   See below  Reviewed and issued intrinsic hand strengthening exercis with rubber band x20, three times/day                                   Therapeutic Activity                                       Neuromuscular Re-education                                                             Modalities                    Moist heat  3 min  3 min  3 min  3 mi  3 min          Pulsed Ultrasound 50%, 3.0 MHZ, 1.2 W/CM2      8 min  8 min  8 min             Assessment: Patient has been wearing kinesiotape since last session. Reports increase in pain in median elbow since last week. 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 Reevaluate next visit, trial counterforce brace with gripping activities next visit. Goals:   Pt complaints of pain in medial elbows will decrease at worst to 2/10. Pt will be independent and compliant with comprehensive HEP to maintain progress achieved in OT. Patient will demonstrate increase in left  strength to at least 25 lbs for ease in picking up cleaning bucket. Patient will demonstrate increase in left wrist WHITMAN by 10 degrees for ease in opening jars. Plan: Continue to work towards managing pain, increase wrist AROM and strengthen.       Charges: MT, TE,US Total Timed Treatment: 40 min  Total Treatment Time: 45 min

## 2023-03-16 ENCOUNTER — OFFICE VISIT (OUTPATIENT)
Dept: OCCUPATIONAL MEDICINE | Facility: HOSPITAL | Age: 53
End: 2023-03-16
Attending: PHYSICAL MEDICINE & REHABILITATION
Payer: COMMERCIAL

## 2023-03-16 PROCEDURE — 97110 THERAPEUTIC EXERCISES: CPT | Performed by: OCCUPATIONAL THERAPIST

## 2023-03-16 PROCEDURE — 97140 MANUAL THERAPY 1/> REGIONS: CPT | Performed by: OCCUPATIONAL THERAPIST

## 2023-03-16 NOTE — PROGRESS NOTES
Dx:     Elbow pain, chronic, right (M25.521,G89.29)  Medial epicondylitis of elbow, left (M77.02)  Elbow strain, left, initial encounter (R16.188P)  Plantar fasciitis of right foot (M72.2)      Authorized # of Visits:  8        Next MD visit: none scheduled  Fall Risk: standard         Precautions: None          Medication Changes since last visit?: No  Subjective: \"More pain in my elbow today, therapy not helping that much. \"    Objective: Treatment began with moist heat follow by STM along flexors, elbow AROM, wrist AROM, functional reaching, ultrasound pulsed, see flow sheet for details.       Date 02/22/23  03/03/23  03/07/23  03/10/23  03/14/23  03/16/23       Visit # 1  2  3  4  5/8 6/8                          Evaluation x                Manual                   STM medial elbows 5 min  IASTM of forearm flexors 5 min  IASTM of forearm flexors 5 min  IASTM of forearm flexors 5 min  IASTM of forearm flexors 8 min  STM of forearm flexors 8 min        Passive wrist extension with elbow extended in neutral and supinated      10 sec x 5  10sec x 5  10 sec x 5  20 sec x 5       kinesiotape      Reevalaution      MFR left UE in supine      6 min      Patient education on donning counterforce brace 5 min       Ther ex                   Prayer stretch 10 sec x 5  Pulley overhead stretch x 20  exerstick  2 min  exerstick  2 min  flexbar wrist flex/ext x 20  exerstick  2 min       Elbow active flexion/ext x10  yellow swiss ball  Zig zag 20 feet across wall x 20  yellow swiss ball  Zig zag 20 feet across wall x 20  yellow swiss ball  Zig zag 20 feet across wall x 20    yellow swiss ball  Zig zag 20 feet across wall x 20        self wrist flexion stretch  10 sec x 5  red web stretch 10 sec x 5, flex/ext  pronaiton/supination with 0 wt x 20  red web stretch 10 sec x 5, flex/ext  Blue web wrist flexor stretches 10 sec x 10  red web  x 20        dowel nate 2# wt, elbow flex/ext, elbow extension stretch    x10      wrist ext, UD/RD PRE 2# x 15  wrist ext, UD/RD PRE 2# x 15        functional reaching into cabinet for cones    x28      wrist flexion  eccentric PRE with 2# wt   wrist flexion  eccentric PRE with 2# wt  X 15       Intrinsic hand strengthening exercise with rubber band  x20    x20     Red putty, IP , pinch, pull  5 min  5 min 5 min 5 min      two point pinch to remove velcro checkers    x40    x40           Pronation/supination with 1 wt   x20 x20 x20 Eccentric  x20      three point pinch of green clothespin to  velcro checkers    x40    x40  x40         HEP instruction   See below  Reviewed and issued intrinsic hand strengthening exercis with rubber band x20, three times/day                                   Therapeutic Activity                                       Neuromuscular Re-education                                                             Modalities                    Moist heat  3 min  3 min  3 min  3 mi  3 min  3 min        Pulsed Ultrasound 50%, 3.0 MHZ, 1.2 W/CM2      8 min  8 min  8 min             Assessment: Patient reports she is experiencing same level of pain in medial elbow with gripping and lifting as prior to PRP injection. Performed therapeutic exercises using counterforce brace, provided some pain relief. Instruct patient to try on in store prior to purchasing. See OT progress note below for details. Goals: See OT progress note below for details    Plan: See OT progress note below for details    Charges: MT2,TE Total Timed Treatment: 40 min  Total Treatment Time: 45 min              OT Progress Summary    Pt has attended 6, cancelled 0, and no shown 0 visits in Occupational Therapy. Subjective: \"The pain is still there just like before the injection. \"  Reports 5-6/10 pain in medial elbow with gripping    Assessment: Patient initially reporting minimal pain in medial elbow however since last week pain intensity in left medial elbow has increased.  Patient reports she has returned to work and has been trying to minimize use of left hand. Reviewed and trial use of counter force brace to reduce pain during work activities. Patient made minimal progress since starting OT, improved left wrist WHITMAN slightly, no improvement in left  strength. Recommend continue with OT. Objective: See below for objective measurements    AROM left wrist flexion: 65 degrees, Extension: 65 degrees, RD 20 degrees, UD 35 degrees    Right  strength AVG: 15 lbs     Left  strength AV lbs  Right key pinch: 16 lbs      Left key pinch: 10 lbs  Right three point pinch: 15 lbs    Left three point pinch: 10 lbs    Goals:   Pt complaints of pain in medial elbows will decrease at worst to 2/10. ...(ongoing)  Pt will be independent and compliant with comprehensive HEP to maintain progress achieved in OT. ....(ongoing)  Patient will demonstrate increase in left  strength to at least 25 lbs for ease in picking up cleaning bucket. ....(ongoing)  Patient will demonstrate increase in left wrist WHITMAN by 10 degrees for ease in opening jars. ..(made progress toward)      Rehab Potential: fair    Plan: Continue skilled Occupational Therapy 1-2 x/week or a total of 6 visits over a 60 day period. Treatment will include: modalities, manual therapy, therapeutic exercise. Patient was advised of these findings, precautions, and treatment options and has agreed to actively participate in planning and for this course of care. Thank you for your referral. If you have any questions, please contact me at Dept: [phone]. Sincerely,  Electronically signed by therapist: Marcell Sacks, OTR/ARTEMIO, BRIAN    Physician's certification required: Yes  Please co-sign or sign and return this letter via fax as soon as possible to [phone]. I certify the need for these services furnished under this plan of treatment and while under my care.     X___________________________________________________ Date____________________    Certification From: 0/[id number]/[id number]  To:5/14/2023

## 2023-03-20 ENCOUNTER — OFFICE VISIT (OUTPATIENT)
Dept: PHYSICAL MEDICINE AND REHAB | Facility: CLINIC | Age: 53
End: 2023-03-20
Payer: COMMERCIAL

## 2023-03-20 VITALS
WEIGHT: 186 LBS | SYSTOLIC BLOOD PRESSURE: 120 MMHG | HEIGHT: 61 IN | BODY MASS INDEX: 35.12 KG/M2 | DIASTOLIC BLOOD PRESSURE: 70 MMHG

## 2023-03-20 DIAGNOSIS — M77.02 MEDIAL EPICONDYLITIS OF ELBOW, LEFT: ICD-10-CM

## 2023-03-20 DIAGNOSIS — M25.522 BILATERAL ELBOW JOINT PAIN: ICD-10-CM

## 2023-03-20 DIAGNOSIS — M25.521 BILATERAL ELBOW JOINT PAIN: ICD-10-CM

## 2023-03-20 DIAGNOSIS — M25.521 ELBOW PAIN, CHRONIC, RIGHT: ICD-10-CM

## 2023-03-20 DIAGNOSIS — S46.912A ELBOW STRAIN, LEFT, INITIAL ENCOUNTER: ICD-10-CM

## 2023-03-20 DIAGNOSIS — M99.9 BIOMECHANICAL LESION: ICD-10-CM

## 2023-03-20 DIAGNOSIS — G89.29 ELBOW PAIN, CHRONIC, RIGHT: ICD-10-CM

## 2023-03-20 DIAGNOSIS — M79.10 MYALGIA: ICD-10-CM

## 2023-03-20 DIAGNOSIS — R20.0 NUMBNESS IN BOTH HANDS: ICD-10-CM

## 2023-03-20 DIAGNOSIS — M77.00 MEDIAL EPICONDYLITIS OF ELBOW, UNSPECIFIED LATERALITY: Primary | ICD-10-CM

## 2023-03-20 DIAGNOSIS — M72.2 PLANTAR FASCIITIS OF RIGHT FOOT: ICD-10-CM

## 2023-03-20 PROCEDURE — 3078F DIAST BP <80 MM HG: CPT | Performed by: PHYSICAL MEDICINE & REHABILITATION

## 2023-03-20 PROCEDURE — 3074F SYST BP LT 130 MM HG: CPT | Performed by: PHYSICAL MEDICINE & REHABILITATION

## 2023-03-20 PROCEDURE — 99214 OFFICE O/P EST MOD 30 MIN: CPT | Performed by: PHYSICAL MEDICINE & REHABILITATION

## 2023-03-20 PROCEDURE — 3008F BODY MASS INDEX DOCD: CPT | Performed by: PHYSICAL MEDICINE & REHABILITATION

## 2023-03-20 NOTE — PATIENT INSTRUCTIONS
1) Make an anjana[ointment with Dr. Fransico Nieves (Orthopedics upper extremity specialist) for the elbow pain  2) For plantar fasciitis, start using Cam walking boot at all times while not in physical therapy. This includes sleeping with it. 3) Ice massage 4-5 times per day (frozen water bottle or frozen lemon) for about 5-7 minutes  4) Follow up with me in about 6 weeks.

## 2023-03-22 ENCOUNTER — TELEPHONE (OUTPATIENT)
Dept: ORTHOPEDICS CLINIC | Facility: CLINIC | Age: 53
End: 2023-03-22

## 2023-03-22 NOTE — TELEPHONE ENCOUNTER
Patient scheduled with Dr. Eric Tee for left elbow. Patient has MRI in epic, no xray. Advised to arrive early for xray.      Future Appointments   Date Time Provider Deion Zapien   3/27/2023  1:40 PM Mike Bae MD [facility] [id number]   3/29/2023 10:30 AM Eric Aguirre OT [facility] OT EM [facility]   5/15/2023  4:40 PM Ovidio Rodríguez MD [facility]

## 2023-03-24 ENCOUNTER — TELEPHONE (OUTPATIENT)
Dept: PHYSICAL THERAPY | Facility: HOSPITAL | Age: 53
End: 2023-03-24

## 2023-03-27 ENCOUNTER — OFFICE VISIT (OUTPATIENT)
Dept: ORTHOPEDICS CLINIC | Facility: CLINIC | Age: 53
End: 2023-03-27
Payer: COMMERCIAL

## 2023-03-27 VITALS — WEIGHT: 186 LBS | HEIGHT: 61 IN | BODY MASS INDEX: 35.12 KG/M2

## 2023-03-27 DIAGNOSIS — M77.02 MEDIAL EPICONDYLITIS OF LEFT ELBOW: Primary | ICD-10-CM

## 2023-03-27 PROCEDURE — 3008F BODY MASS INDEX DOCD: CPT | Performed by: ORTHOPAEDIC SURGERY

## 2023-03-27 PROCEDURE — 99204 OFFICE O/P NEW MOD 45 MIN: CPT | Performed by: ORTHOPAEDIC SURGERY

## 2023-03-29 ENCOUNTER — APPOINTMENT (OUTPATIENT)
Dept: OCCUPATIONAL MEDICINE | Facility: HOSPITAL | Age: 53
End: 2023-03-29
Attending: PHYSICAL MEDICINE & REHABILITATION
Payer: COMMERCIAL

## 2023-03-30 NOTE — IMAGING NOTE
Procedure: Ultrasound Guided Tenotomy of Left elbow  Date: 5/15/23  Radiologist: Dr Osito Fragoso instructions:  1. No driving for 24 hours after the procedure  2. Keep bandages and procedure site clean and dry for 3 days after the procedure. 3. May apply ice pack to the procedure site for 20 min as needed for discomfort  4. May take over the counter pain medication such as Tylenol or Advil as directed by the . 5. Avoid submerging in water (Swimming, Tub bath) for 5 days. 6. Radiology department will call in 2 weeks for a follow up check up  Date: 5/29/23    Time: Before 6PM   Specific patient instruction:  1. Rest your Elbow today (day of the procedure)  2. Wear the sling for 2 weeks when up and about. May remove for bathing and sleep  3. Start daily gentle non repetitive/ non weight bearing range of motion exercises on the 3rd day post procedure  4. Make physical therapy appointment to start in a week after the procedure  5. May begin stretching exercises as directed by the Physical Therapist  6. No ?lifting objections with arm/hand greater than 5 pounds for 6 weeks. Contact Radiology RN office at [phone] from 730 am-6 pm Monday thru Friday  1. If area becomes red or hot to touch  2. Increase swelling  3. For drainage from site  4. For temperature over 101.0-degree F  In case of emergency, contact your physician or go to the nearest Emergency Department.

## 2023-05-01 ENCOUNTER — MED REC SCAN ONLY (OUTPATIENT)
Dept: ORTHOPEDICS CLINIC | Facility: CLINIC | Age: 53
End: 2023-05-01

## 2023-05-15 ENCOUNTER — ORDER TRANSCRIPTION (OUTPATIENT)
Dept: OCCUPATIONAL MEDICINE | Facility: HOSPITAL | Age: 53
End: 2023-05-15

## 2023-05-15 ENCOUNTER — HOSPITAL ENCOUNTER (OUTPATIENT)
Dept: ULTRASOUND IMAGING | Facility: HOSPITAL | Age: 53
Discharge: HOME | End: 2023-05-15
Attending: ORTHOPAEDIC SURGERY
Payer: COMMERCIAL

## 2023-05-15 ENCOUNTER — TELEPHONE (OUTPATIENT)
Dept: PHYSICAL MEDICINE AND REHAB | Facility: CLINIC | Age: 53
End: 2023-05-15

## 2023-05-15 ENCOUNTER — OFFICE VISIT (OUTPATIENT)
Dept: PHYSICAL MEDICINE AND REHAB | Facility: CLINIC | Age: 53
End: 2023-05-15
Payer: COMMERCIAL

## 2023-05-15 VITALS — BODY MASS INDEX: 34 KG/M2 | OXYGEN SATURATION: 98 % | HEART RATE: 82 BPM | WEIGHT: 180 LBS

## 2023-05-15 DIAGNOSIS — M79.10 MYALGIA: ICD-10-CM

## 2023-05-15 DIAGNOSIS — M79.671 RIGHT FOOT PAIN: ICD-10-CM

## 2023-05-15 DIAGNOSIS — M54.59 MECHANICAL LOW BACK PAIN: ICD-10-CM

## 2023-05-15 DIAGNOSIS — M51.16 LUMBAR DISC HERNIATION WITH RADICULOPATHY: ICD-10-CM

## 2023-05-15 DIAGNOSIS — E78.5 HYPERLIPIDEMIA, UNSPECIFIED HYPERLIPIDEMIA TYPE: ICD-10-CM

## 2023-05-15 DIAGNOSIS — M77.00 MEDIAL EPICONDYLITIS: Primary | ICD-10-CM

## 2023-05-15 DIAGNOSIS — M47.816 FACET SYNDROME, LUMBAR: ICD-10-CM

## 2023-05-15 DIAGNOSIS — M54.2 TRIGGER POINT OF NECK: ICD-10-CM

## 2023-05-15 DIAGNOSIS — M25.522 BILATERAL ELBOW JOINT PAIN: ICD-10-CM

## 2023-05-15 DIAGNOSIS — M48.061 LUMBAR FORAMINAL STENOSIS: ICD-10-CM

## 2023-05-15 DIAGNOSIS — M72.2 PLANTAR FASCIITIS OF RIGHT FOOT: ICD-10-CM

## 2023-05-15 DIAGNOSIS — M77.02 MEDIAL EPICONDYLITIS OF LEFT ELBOW: ICD-10-CM

## 2023-05-15 DIAGNOSIS — M77.00 MEDIAL EPICONDYLITIS OF ELBOW, UNSPECIFIED LATERALITY: Primary | ICD-10-CM

## 2023-05-15 DIAGNOSIS — M51.36 BULGE OF LUMBAR DISC WITHOUT MYELOPATHY: ICD-10-CM

## 2023-05-15 DIAGNOSIS — M99.9 BIOMECHANICAL LESION: ICD-10-CM

## 2023-05-15 DIAGNOSIS — M51.37 DDD (DEGENERATIVE DISC DISEASE), LUMBOSACRAL: ICD-10-CM

## 2023-05-15 DIAGNOSIS — M72.2 PLANTAR FASCIITIS, RIGHT: ICD-10-CM

## 2023-05-15 DIAGNOSIS — M25.521 BILATERAL ELBOW JOINT PAIN: ICD-10-CM

## 2023-05-15 DIAGNOSIS — M47.816 LUMBAR SPONDYLOSIS: ICD-10-CM

## 2023-05-15 DIAGNOSIS — M54.59 LUMBAR TRIGGER POINT SYNDROME: ICD-10-CM

## 2023-05-15 PROCEDURE — 99214 OFFICE O/P EST MOD 30 MIN: CPT | Performed by: PHYSICAL MEDICINE & REHABILITATION

## 2023-05-15 PROCEDURE — 76942 ECHO GUIDE FOR BIOPSY: CPT | Performed by: ORTHOPAEDIC SURGERY

## 2023-05-15 PROCEDURE — 24357 REPAIR ELBOW PERC: CPT | Performed by: ORTHOPAEDIC SURGERY

## 2023-05-15 NOTE — PATIENT INSTRUCTIONS
1) Make an appointment with radiology at [facility] to have Tenex for plantar fascia  2) Start occupational therapy for the left elbow at [facility] in [facility]  3) Start Medrol dose pack (Oral steroids) in the meantime  4) Follow up with me after the Tenex Procedure. 5) Please call and schedule your MRI at [phone]. Once you have your MRI scheduled, then call my office again to schedule a follow-up visit soon after your MRI so we may review the images together.

## 2023-05-15 NOTE — TELEPHONE ENCOUNTER
Initiated authorization for Percutaneous tenotomy right plantar fascia CPT 37414, 79473 with [employer]  Transaction #[id number]  Status: Approved-authorization is not required per health plan    Patient will have procedure at [facility] per Dr. Fausto Chan note and follow up after

## 2023-05-16 ENCOUNTER — TELEPHONE (OUTPATIENT)
Dept: OCCUPATIONAL MEDICINE | Facility: HOSPITAL | Age: 53
End: 2023-05-16

## 2023-05-25 ENCOUNTER — LAB ENCOUNTER (OUTPATIENT)
Dept: LAB | Facility: REFERENCE LAB | Age: 53
End: 2023-05-25
Attending: INTERNAL MEDICINE
Payer: COMMERCIAL

## 2023-05-25 ENCOUNTER — OFFICE VISIT (OUTPATIENT)
Dept: INTERNAL MEDICINE CLINIC | Facility: CLINIC | Age: 53
End: 2023-05-25
Payer: COMMERCIAL

## 2023-05-25 VITALS
HEART RATE: 84 BPM | WEIGHT: 184 LBS | HEIGHT: 60.5 IN | SYSTOLIC BLOOD PRESSURE: 96 MMHG | BODY MASS INDEX: 35.19 KG/M2 | DIASTOLIC BLOOD PRESSURE: 70 MMHG | TEMPERATURE: 98 F | OXYGEN SATURATION: 99 %

## 2023-05-25 DIAGNOSIS — M65.9 TENOSYNOVITIS OF ELBOW: ICD-10-CM

## 2023-05-25 DIAGNOSIS — R73.09 ELEVATED GLUCOSE: ICD-10-CM

## 2023-05-25 DIAGNOSIS — R30.0 DYSURIA: ICD-10-CM

## 2023-05-25 DIAGNOSIS — R63.5 WEIGHT GAIN: ICD-10-CM

## 2023-05-25 DIAGNOSIS — M54.16 LUMBAR RADICULOPATHY: ICD-10-CM

## 2023-05-25 DIAGNOSIS — Z00.00 ANNUAL PHYSICAL EXAM: Primary | ICD-10-CM

## 2023-05-25 DIAGNOSIS — Z00.00 ANNUAL PHYSICAL EXAM: ICD-10-CM

## 2023-05-25 LAB
ALBUMIN SERPL-MCNC: 3.5 G/DL (ref 3.4–5)
ALBUMIN/GLOB SERPL: 1.1 {RATIO} (ref 1–2)
ALP LIVER SERPL-CCNC: 108 U/L
ALT SERPL-CCNC: 19 U/L
ANION GAP SERPL CALC-SCNC: 6 MMOL/L (ref 0–18)
AST SERPL-CCNC: 11 U/L (ref 15–37)
BASOPHILS # BLD AUTO: 0.04 X10(3) UL (ref 0–0.2)
BASOPHILS NFR BLD AUTO: 0.5 %
BILIRUB SERPL-MCNC: 0.3 MG/DL (ref 0.1–2)
BILIRUB UR QL: NEGATIVE
BUN BLD-MCNC: 14 MG/DL (ref 7–18)
BUN/CREAT SERPL: 23.3 (ref 10–20)
CALCIUM BLD-MCNC: 8.8 MG/DL (ref 8.5–10.1)
CHLORIDE SERPL-SCNC: 112 MMOL/L (ref 98–112)
CHOLEST SERPL-MCNC: 187 MG/DL (ref ?–200)
CO2 SERPL-SCNC: 23 MMOL/L (ref 21–32)
CREAT BLD-MCNC: 0.6 MG/DL
DEPRECATED RDW RBC AUTO: 42.1 FL (ref 35.1–46.3)
EOSINOPHIL # BLD AUTO: 0.13 X10(3) UL (ref 0–0.7)
EOSINOPHIL NFR BLD AUTO: 1.6 %
ERYTHROCYTE [DISTWIDTH] IN BLOOD BY AUTOMATED COUNT: 13.6 % (ref 11–15)
EST. AVERAGE GLUCOSE BLD GHB EST-MCNC: 108 MG/DL (ref 68–126)
FASTING PATIENT LIPID ANSWER: NO
FASTING STATUS PATIENT QL REPORTED: NO
GFR SERPLBLD BASED ON 1.73 SQ M-ARVRAT: 109 ML/MIN/1.73M2 (ref 60–?)
GLOBULIN PLAS-MCNC: 3.3 G/DL (ref 2.8–4.4)
GLUCOSE BLD-MCNC: 90 MG/DL (ref 70–99)
GLUCOSE UR-MCNC: NORMAL MG/DL
HBA1C MFR BLD: 5.4 % (ref ?–5.7)
HCT VFR BLD AUTO: 36.5 %
HDLC SERPL-MCNC: 44 MG/DL (ref 40–59)
HGB BLD-MCNC: 12.2 G/DL
IMM GRANULOCYTES # BLD AUTO: 0.03 X10(3) UL (ref 0–1)
IMM GRANULOCYTES NFR BLD: 0.4 %
KETONES UR-MCNC: NEGATIVE MG/DL
LDLC SERPL CALC-MCNC: 93 MG/DL (ref ?–100)
LEUKOCYTE ESTERASE UR QL STRIP.AUTO: 250
LYMPHOCYTES # BLD AUTO: 2.31 X10(3) UL (ref 1–4)
LYMPHOCYTES NFR BLD AUTO: 27.8 %
MCH RBC QN AUTO: 28.6 PG (ref 26–34)
MCHC RBC AUTO-ENTMCNC: 33.4 G/DL (ref 31–37)
MCV RBC AUTO: 85.7 FL
MONOCYTES # BLD AUTO: 0.49 X10(3) UL (ref 0.1–1)
MONOCYTES NFR BLD AUTO: 5.9 %
NEUTROPHILS # BLD AUTO: 5.3 X10 (3) UL (ref 1.5–7.7)
NEUTROPHILS # BLD AUTO: 5.3 X10(3) UL (ref 1.5–7.7)
NEUTROPHILS NFR BLD AUTO: 63.8 %
NONHDLC SERPL-MCNC: 143 MG/DL (ref ?–130)
OSMOLALITY SERPL CALC.SUM OF ELEC: 292 MOSM/KG (ref 275–295)
PH UR: 6 [PH] (ref 5–8)
PLATELET # BLD AUTO: 437 10(3)UL (ref 150–450)
POTASSIUM SERPL-SCNC: 3.6 MMOL/L (ref 3.5–5.1)
PROT SERPL-MCNC: 6.8 G/DL (ref 6.4–8.2)
PROT UR-MCNC: NEGATIVE MG/DL
RBC # BLD AUTO: 4.26 X10(6)UL
RBC #/AREA URNS AUTO: >10 /HPF
SODIUM SERPL-SCNC: 141 MMOL/L (ref 136–145)
SP GR UR STRIP: 1.02 (ref 1–1.03)
TRIGL SERPL-MCNC: 301 MG/DL (ref 30–149)
TSI SER-ACNC: 0.9 MIU/ML (ref 0.36–3.74)
UROBILINOGEN UR STRIP-ACNC: NORMAL
VLDLC SERPL CALC-MCNC: 50 MG/DL (ref 0–30)
WBC # BLD AUTO: 8.3 X10(3) UL (ref 4–11)

## 2023-05-25 PROCEDURE — 87088 URINE BACTERIA CULTURE: CPT

## 2023-05-25 PROCEDURE — 99213 OFFICE O/P EST LOW 20 MIN: CPT | Performed by: INTERNAL MEDICINE

## 2023-05-25 PROCEDURE — 83036 HEMOGLOBIN GLYCOSYLATED A1C: CPT

## 2023-05-25 PROCEDURE — 80061 LIPID PANEL: CPT

## 2023-05-25 PROCEDURE — 3074F SYST BP LT 130 MM HG: CPT | Performed by: INTERNAL MEDICINE

## 2023-05-25 PROCEDURE — 3008F BODY MASS INDEX DOCD: CPT | Performed by: INTERNAL MEDICINE

## 2023-05-25 PROCEDURE — 87086 URINE CULTURE/COLONY COUNT: CPT

## 2023-05-25 PROCEDURE — 99396 PREV VISIT EST AGE 40-64: CPT | Performed by: INTERNAL MEDICINE

## 2023-05-25 PROCEDURE — 84443 ASSAY THYROID STIM HORMONE: CPT

## 2023-05-25 PROCEDURE — 3078F DIAST BP <80 MM HG: CPT | Performed by: INTERNAL MEDICINE

## 2023-05-25 PROCEDURE — 36415 COLL VENOUS BLD VENIPUNCTURE: CPT

## 2023-05-25 PROCEDURE — 80053 COMPREHEN METABOLIC PANEL: CPT

## 2023-05-25 PROCEDURE — 81001 URINALYSIS AUTO W/SCOPE: CPT

## 2023-05-25 PROCEDURE — 87186 SC STD MICRODIL/AGAR DIL: CPT

## 2023-05-25 RX ORDER — OMEPRAZOLE 20 MG/1
20 CAPSULE, DELAYED RELEASE ORAL DAILY
Qty: 90 CAPSULE | Refills: 1 | Status: SHIPPED | OUTPATIENT
Start: 2023-05-25

## 2023-05-25 RX ORDER — NAPROXEN 500 MG/1
500 TABLET ORAL 2 TIMES DAILY PRN
Qty: 60 TABLET | Refills: 0 | Status: SHIPPED | OUTPATIENT
Start: 2023-05-25

## 2023-05-30 ENCOUNTER — TELEPHONE (OUTPATIENT)
Dept: PHYSICAL MEDICINE AND REHAB | Facility: CLINIC | Age: 53
End: 2023-05-30

## 2023-05-31 ENCOUNTER — TELEPHONE (OUTPATIENT)
Dept: CT IMAGING | Facility: HOSPITAL | Age: 53
End: 2023-05-31

## 2023-05-31 NOTE — TELEPHONE ENCOUNTER
Provided callback to patient for follow up on her L elbow tenotomy procedure using [place] from the [facility]. No answer but did leave message regarding the reason for the call and that if she had any questions or concerns related to the procedure or the procedure site appearance, she could call us back. Callback # provided.

## 2023-06-07 ENCOUNTER — MED REC SCAN ONLY (OUTPATIENT)
Dept: ORTHOPEDICS CLINIC | Facility: CLINIC | Age: 53
End: 2023-06-07

## 2023-06-12 ENCOUNTER — OFFICE VISIT (OUTPATIENT)
Dept: PHYSICAL MEDICINE AND REHAB | Facility: CLINIC | Age: 53
End: 2023-06-12
Payer: COMMERCIAL

## 2023-06-12 ENCOUNTER — TELEPHONE (OUTPATIENT)
Dept: INTERNAL MEDICINE CLINIC | Facility: CLINIC | Age: 53
End: 2023-06-12

## 2023-06-12 VITALS
HEIGHT: 60.5 IN | SYSTOLIC BLOOD PRESSURE: 130 MMHG | WEIGHT: 184 LBS | BODY MASS INDEX: 35.19 KG/M2 | DIASTOLIC BLOOD PRESSURE: 68 MMHG

## 2023-06-12 DIAGNOSIS — M51.16 LUMBAR DISC HERNIATION WITH RADICULOPATHY: ICD-10-CM

## 2023-06-12 DIAGNOSIS — M51.36 BULGE OF LUMBAR DISC WITHOUT MYELOPATHY: ICD-10-CM

## 2023-06-12 DIAGNOSIS — M47.816 FACET SYNDROME, LUMBAR: ICD-10-CM

## 2023-06-12 DIAGNOSIS — M77.00 MEDIAL EPICONDYLITIS OF ELBOW, UNSPECIFIED LATERALITY: Primary | ICD-10-CM

## 2023-06-12 DIAGNOSIS — M79.10 MYALGIA: ICD-10-CM

## 2023-06-12 DIAGNOSIS — M54.16 LUMBAR RADICULOPATHY: ICD-10-CM

## 2023-06-12 DIAGNOSIS — M54.59 MECHANICAL LOW BACK PAIN: ICD-10-CM

## 2023-06-12 DIAGNOSIS — M48.061 LUMBAR FORAMINAL STENOSIS: ICD-10-CM

## 2023-06-12 DIAGNOSIS — M51.37 DDD (DEGENERATIVE DISC DISEASE), LUMBOSACRAL: ICD-10-CM

## 2023-06-12 DIAGNOSIS — M47.816 LUMBAR SPONDYLOSIS: ICD-10-CM

## 2023-06-12 RX ORDER — GABAPENTIN 100 MG/1
100 CAPSULE ORAL 3 TIMES DAILY
Qty: 90 CAPSULE | Refills: 0 | Status: SHIPPED | OUTPATIENT
Start: 2023-06-12

## 2023-06-12 RX ORDER — NITROFURANTOIN 25; 75 MG/1; MG/1
100 CAPSULE ORAL 2 TIMES DAILY
Qty: 10 CAPSULE | Refills: 0 | Status: SHIPPED | OUTPATIENT
Start: 2023-06-12

## 2023-06-12 RX ORDER — DICLOFENAC SODIUM 75 MG/1
75 TABLET, DELAYED RELEASE ORAL 2 TIMES DAILY
Qty: 60 TABLET | Refills: 1 | Status: SHIPPED | OUTPATIENT
Start: 2023-06-12

## 2023-06-12 NOTE — TELEPHONE ENCOUNTER
----- Message from Yris Mitchell MD sent at 6/12/2023 11:24 AM CDT -----  Please contact pt. Her urine culture returned with E coli. Of not she was taking ciprofloxacin from outside pharmacy/clinic. This should have treated it. Please inquire regarding her symptoms. Is still present will need to start macrobid 100 mg BID for 5 days. Triglycerides elevated on Lipid panel. Triglycerides are a type of fat (lipid) found in your blood. Recommendations to lower triglycerides include avoiding sugars, refined carbohydrates and alcohol. Increasing foods higher in omega-3 such as fish, dark leafy green vegetables. No other abnormality. PQ      Patient was contacted and was asked about UTI symptoms, she still has some symptoms and a Rx was sent in for Richie Cruz 103. Rest of results discussed and she verbalized understanding.

## 2023-06-12 NOTE — PATIENT INSTRUCTIONS
1) Follow up with Dr. Kendrick Ho for the left elbow pain following TENEX  2) Please begin physical therapy as soon as possible. 3) Follow up with me in about 8 weeks. If low back pain and symptoms persist, then would recommend L5-S1 ILESI as this helped int he past.  4) Continue follow up with podiatry for the plantar fascia  5) Start gabapentin 100 mg three times per day. If limited improvement, then would increase to 200 mg three times per day. 6) Take Diclofenac 75 mg 1 tablet twice per day with food for the next two weeks and then as needed but no more than 2 tablets per day. Do not take with any other NSAIDS (Ibuprofen, Advil, Aleve, Naprosyn etc). OK to take Tylenol 500 mg every 6 hours as needed for pain. If you develop any side effects including stomach aches, nausea, vomiting, or other gastrointestinal symptoms, stop the medication and call my office. 7) Call me tomorrow and let me know what medication is in the Excedrin you take. We will then direct you whether it is ok to take the Diclofenac with that.

## 2023-06-19 ENCOUNTER — MED REC SCAN ONLY (OUTPATIENT)
Dept: NEUROLOGY | Facility: CLINIC | Age: 53
End: 2023-06-19

## 2023-07-03 ENCOUNTER — MED REC SCAN ONLY (OUTPATIENT)
Dept: PHYSICAL MEDICINE AND REHAB | Facility: CLINIC | Age: 53
End: 2023-07-03

## 2023-09-21 ENCOUNTER — OFFICE VISIT (OUTPATIENT)
Dept: PHYSICAL MEDICINE AND REHAB | Facility: CLINIC | Age: 53
End: 2023-09-21
Payer: COMMERCIAL

## 2023-09-21 ENCOUNTER — OFFICE VISIT (OUTPATIENT)
Dept: INTERNAL MEDICINE CLINIC | Facility: CLINIC | Age: 53
End: 2023-09-21
Payer: COMMERCIAL

## 2023-09-21 VITALS
SYSTOLIC BLOOD PRESSURE: 106 MMHG | OXYGEN SATURATION: 97 % | TEMPERATURE: 97 F | WEIGHT: 183 LBS | BODY MASS INDEX: 35 KG/M2 | DIASTOLIC BLOOD PRESSURE: 64 MMHG | HEART RATE: 77 BPM

## 2023-09-21 VITALS
WEIGHT: 185.63 LBS | HEART RATE: 92 BPM | DIASTOLIC BLOOD PRESSURE: 78 MMHG | BODY MASS INDEX: 34.16 KG/M2 | SYSTOLIC BLOOD PRESSURE: 116 MMHG | OXYGEN SATURATION: 98 % | HEIGHT: 61.81 IN

## 2023-09-21 DIAGNOSIS — M54.2 NECK PAIN: ICD-10-CM

## 2023-09-21 DIAGNOSIS — G25.89 SCAPULAR DYSKINESIS: ICD-10-CM

## 2023-09-21 DIAGNOSIS — M54.2 TRIGGER POINT OF NECK: ICD-10-CM

## 2023-09-21 DIAGNOSIS — M54.50 LUMBAR BACK PAIN: Primary | ICD-10-CM

## 2023-09-21 DIAGNOSIS — E78.5 HYPERLIPIDEMIA, UNSPECIFIED HYPERLIPIDEMIA TYPE: Primary | ICD-10-CM

## 2023-09-21 DIAGNOSIS — M50.30 DDD (DEGENERATIVE DISC DISEASE), CERVICAL: ICD-10-CM

## 2023-09-21 DIAGNOSIS — E78.5 HYPERLIPIDEMIA, UNSPECIFIED HYPERLIPIDEMIA TYPE: ICD-10-CM

## 2023-09-21 DIAGNOSIS — M47.812 CERVICAL FACET SYNDROME: ICD-10-CM

## 2023-09-21 DIAGNOSIS — M79.10 MYALGIA: ICD-10-CM

## 2023-09-21 DIAGNOSIS — I10 ESSENTIAL HYPERTENSION: ICD-10-CM

## 2023-09-21 DIAGNOSIS — K21.9 GASTROESOPHAGEAL REFLUX DISEASE, UNSPECIFIED WHETHER ESOPHAGITIS PRESENT: ICD-10-CM

## 2023-09-21 PROCEDURE — 3078F DIAST BP <80 MM HG: CPT | Performed by: INTERNAL MEDICINE

## 2023-09-21 PROCEDURE — 99214 OFFICE O/P EST MOD 30 MIN: CPT | Performed by: PHYSICAL MEDICINE & REHABILITATION

## 2023-09-21 PROCEDURE — 3078F DIAST BP <80 MM HG: CPT | Performed by: PHYSICAL MEDICINE & REHABILITATION

## 2023-09-21 PROCEDURE — 3008F BODY MASS INDEX DOCD: CPT | Performed by: PHYSICAL MEDICINE & REHABILITATION

## 2023-09-21 PROCEDURE — 3074F SYST BP LT 130 MM HG: CPT | Performed by: PHYSICAL MEDICINE & REHABILITATION

## 2023-09-21 PROCEDURE — 3074F SYST BP LT 130 MM HG: CPT | Performed by: INTERNAL MEDICINE

## 2023-09-21 PROCEDURE — 99214 OFFICE O/P EST MOD 30 MIN: CPT | Performed by: INTERNAL MEDICINE

## 2023-09-21 RX ORDER — CYCLOBENZAPRINE HCL 5 MG
TABLET ORAL
COMMUNITY
Start: 2023-09-20 | End: 2023-09-21

## 2023-09-21 RX ORDER — DICLOFENAC SODIUM 75 MG/1
75 TABLET, DELAYED RELEASE ORAL 2 TIMES DAILY
Qty: 60 TABLET | Refills: 1 | Status: SHIPPED | OUTPATIENT
Start: 2023-09-21

## 2023-09-21 RX ORDER — ROSUVASTATIN CALCIUM 40 MG/1
40 TABLET, COATED ORAL NIGHTLY
Qty: 90 TABLET | Refills: 1 | Status: SHIPPED | OUTPATIENT
Start: 2023-09-21

## 2023-09-21 RX ORDER — METHYLPREDNISOLONE 4 MG/1
TABLET ORAL
COMMUNITY
Start: 2023-09-05 | End: 2023-09-21 | Stop reason: ALTCHOICE

## 2023-09-21 RX ORDER — NAPROXEN 500 MG/1
500 TABLET ORAL 2 TIMES DAILY PRN
Qty: 60 TABLET | Refills: 1 | Status: SHIPPED | OUTPATIENT
Start: 2023-09-21 | End: 2023-09-21

## 2023-09-21 RX ORDER — OMEPRAZOLE 20 MG/1
20 CAPSULE, DELAYED RELEASE ORAL DAILY
Qty: 90 CAPSULE | Refills: 1 | Status: SHIPPED | OUTPATIENT
Start: 2023-09-21

## 2023-09-21 NOTE — PATIENT INSTRUCTIONS
1) Please begin physical therapy as soon as possible. 2) Continue Diclofenac 75 mg twice per day with food. 3) Tylenol 500-1000 mg every 6-8 hours as needed for pain. No more than 3000 mg daily. 4)  Ice 20 minutes at a time 3-4 times per day  5) Follow up with me in about 6 weeks.  If symptoms persist, then would recommend BILATERAL C4-C5 and C5-C6 facet joint injections

## 2023-10-19 ENCOUNTER — OFFICE VISIT (OUTPATIENT)
Dept: PHYSICAL MEDICINE AND REHAB | Facility: CLINIC | Age: 53
End: 2023-10-19
Payer: COMMERCIAL

## 2023-10-19 VITALS — HEIGHT: 61.81 IN | WEIGHT: 185 LBS | BODY MASS INDEX: 34.04 KG/M2

## 2023-10-19 DIAGNOSIS — M54.16 LUMBAR RADICULOPATHY: ICD-10-CM

## 2023-10-19 DIAGNOSIS — M51.36 BULGE OF LUMBAR DISC WITHOUT MYELOPATHY: Primary | ICD-10-CM

## 2023-10-19 DIAGNOSIS — M79.10 MYALGIA: ICD-10-CM

## 2023-10-19 DIAGNOSIS — M51.37 DDD (DEGENERATIVE DISC DISEASE), LUMBOSACRAL: ICD-10-CM

## 2023-10-19 DIAGNOSIS — M47.816 LUMBAR SPONDYLOSIS: ICD-10-CM

## 2023-10-19 DIAGNOSIS — M54.59 MECHANICAL LOW BACK PAIN: ICD-10-CM

## 2023-10-19 DIAGNOSIS — M47.816 FACET SYNDROME, LUMBAR: ICD-10-CM

## 2023-10-19 DIAGNOSIS — M48.061 LUMBAR FORAMINAL STENOSIS: ICD-10-CM

## 2023-10-19 DIAGNOSIS — M99.9 BIOMECHANICAL LESION: ICD-10-CM

## 2023-10-19 PROCEDURE — 3008F BODY MASS INDEX DOCD: CPT | Performed by: PHYSICAL MEDICINE & REHABILITATION

## 2023-10-19 PROCEDURE — 99214 OFFICE O/P EST MOD 30 MIN: CPT | Performed by: PHYSICAL MEDICINE & REHABILITATION

## 2023-10-19 NOTE — PATIENT INSTRUCTIONS
My office will call you to schedule the LEFT L5-S1 ILESI under [facility] once the procedure is approved by your insurance carrier.     2) Follow up with me 2  weeks after the procedure

## 2023-10-25 ENCOUNTER — TELEPHONE (OUTPATIENT)
Dept: PHYSICAL MEDICINE AND REHAB | Facility: CLINIC | Age: 53
End: 2023-10-25

## 2023-10-25 DIAGNOSIS — M47.816 LUMBAR SPONDYLOSIS: ICD-10-CM

## 2023-10-25 DIAGNOSIS — M54.59 MECHANICAL LOW BACK PAIN: Primary | ICD-10-CM

## 2023-10-25 NOTE — TELEPHONE ENCOUNTER
Initiated authorization for LEFT L5-S1 ILESI CPT [id number] dx:M48.061 to be done at [facility] with [facility]  Status: Approved w/ order #[id number] valid 11/1/23-11/30/23    Per Dr. Francisco Javier Mccarty w/ [facility]

## 2023-10-25 NOTE — TELEPHONE ENCOUNTER
Pt called wanting to check the status on her referral to get her injection. I did inform pt that it is still in process. Pt stated she has been experiencing a lot of pain and wanted to see if theres anything that could be done to help with her pain.  Please advise Abdominal Pain, N/V/D

## 2023-10-25 NOTE — TELEPHONE ENCOUNTER
Please advise on status of Left L5-S1 Interlaminar epidural steroid injection under [facility] placed on 10/19/2023.

## 2023-10-26 NOTE — TELEPHONE ENCOUNTER
Patient has been scheduled for LEFT L5-S1 ILESI   on 11/8/2023 at the [facility] with Dr. Francisco Javier Mccarty.   -Anesthesia type: IVCS. -Patient was advised that if he/she does receive the covid vaccine it needs to be at least 2 weeks before or after the injection. -Medications and allergies reviewed. -Patient reminded to hold NSAIDs (Ibuprofen, ASA 81, Aleve, Naproxen, Mobic, Diclofenac, Etodolac, Celebrex etc.) for 3 days prior to Lumbar MBB/Facet if BMI is greater than 35. For Cervical injections only hold multivitamins, Vitamin E, Fish Oil, Phentermine/Lomaira for 7 days prior to injection and NSAIDS.  mg to be held for 7 days prior to injections.  -If patient is receiving MAC/IVCS, weight loss oral/injectable medications will need to be held for 7 days prior to injection.  -Patient informed to fast 12 hours prior to procedure with IVCS/MAC.   -If on blood thinner, clearance has been received and approved to hold this medication by provider.   -Patient informed he/she will need a  to and from procedure. [facility] is located in the [facility] [address] 1st floor,  may park in the yellow/purple parking lot. Patient verbalized understanding and agrees with plan. Scheduled in Epic: Yes  Scheduled in Surgical Case: Yes  Follow up appointment made:  No

## 2023-11-10 ENCOUNTER — TELEPHONE (OUTPATIENT)
Dept: INTERNAL MEDICINE CLINIC | Facility: CLINIC | Age: 53
End: 2023-11-10

## 2023-11-10 NOTE — TELEPHONE ENCOUNTER
Patient is asking if she can get a refill on omeprazole she has run out.  Please advise when refill request sent

## 2023-11-13 RX ORDER — OMEPRAZOLE 20 MG/1
20 CAPSULE, DELAYED RELEASE ORAL DAILY
Qty: 90 CAPSULE | Refills: 1 | Status: SHIPPED | OUTPATIENT
Start: 2023-11-13

## 2023-12-19 ENCOUNTER — OFFICE VISIT (OUTPATIENT)
Dept: PHYSICAL MEDICINE AND REHAB | Facility: CLINIC | Age: 53
End: 2023-12-19
Payer: COMMERCIAL

## 2023-12-19 VITALS — WEIGHT: 185 LBS | HEIGHT: 61.85 IN | BODY MASS INDEX: 34.04 KG/M2

## 2023-12-19 DIAGNOSIS — M47.816 FACET SYNDROME, LUMBAR: ICD-10-CM

## 2023-12-19 DIAGNOSIS — I10 ESSENTIAL HYPERTENSION: ICD-10-CM

## 2023-12-19 DIAGNOSIS — M47.816 LUMBAR SPONDYLOSIS: ICD-10-CM

## 2023-12-19 DIAGNOSIS — M79.10 MYALGIA: ICD-10-CM

## 2023-12-19 DIAGNOSIS — M51.37 DDD (DEGENERATIVE DISC DISEASE), LUMBOSACRAL: ICD-10-CM

## 2023-12-19 DIAGNOSIS — M54.16 LUMBAR RADICULOPATHY: ICD-10-CM

## 2023-12-19 DIAGNOSIS — M48.061 LUMBAR FORAMINAL STENOSIS: ICD-10-CM

## 2023-12-19 DIAGNOSIS — M51.36 BULGE OF LUMBAR DISC WITHOUT MYELOPATHY: ICD-10-CM

## 2023-12-19 DIAGNOSIS — M99.9 BIOMECHANICAL LESION: ICD-10-CM

## 2023-12-19 DIAGNOSIS — M51.16 LUMBAR DISC HERNIATION WITH RADICULOPATHY: ICD-10-CM

## 2023-12-19 DIAGNOSIS — E78.5 HYPERLIPIDEMIA, UNSPECIFIED HYPERLIPIDEMIA TYPE: ICD-10-CM

## 2023-12-19 DIAGNOSIS — M54.59 MECHANICAL LOW BACK PAIN: Primary | ICD-10-CM

## 2023-12-19 PROCEDURE — 99214 OFFICE O/P EST MOD 30 MIN: CPT | Performed by: PHYSICAL MEDICINE & REHABILITATION

## 2023-12-19 PROCEDURE — 3008F BODY MASS INDEX DOCD: CPT | Performed by: PHYSICAL MEDICINE & REHABILITATION

## 2023-12-19 NOTE — PATIENT INSTRUCTIONS
1) Make an appointment with Dr. Jacinda Iyer for neurosurgical consultation  2) Follow up with me if needed or if you decide to not go with surgery

## 2024-01-08 ENCOUNTER — TELEPHONE (OUTPATIENT)
Dept: INTERNAL MEDICINE CLINIC | Facility: CLINIC | Age: 54
End: 2024-01-08

## 2024-01-08 ENCOUNTER — OFFICE VISIT (OUTPATIENT)
Dept: SURGERY | Facility: CLINIC | Age: 54
End: 2024-01-08
Payer: COMMERCIAL

## 2024-01-08 ENCOUNTER — HOSPITAL ENCOUNTER (OUTPATIENT)
Dept: GENERAL RADIOLOGY | Facility: HOSPITAL | Age: 54
Discharge: HOME | End: 2024-01-08
Attending: STUDENT IN AN ORGANIZED HEALTH CARE EDUCATION/TRAINING PROGRAM
Payer: COMMERCIAL

## 2024-01-08 VITALS
WEIGHT: 185 LBS | DIASTOLIC BLOOD PRESSURE: 72 MMHG | BODY MASS INDEX: 34.04 KG/M2 | RESPIRATION RATE: 18 BRPM | OXYGEN SATURATION: 99 % | HEIGHT: 61.85 IN | SYSTOLIC BLOOD PRESSURE: 118 MMHG | HEART RATE: 77 BPM

## 2024-01-08 DIAGNOSIS — M51.36 BULGE OF LUMBAR DISC WITHOUT MYELOPATHY: ICD-10-CM

## 2024-01-08 DIAGNOSIS — M51.37 DDD (DEGENERATIVE DISC DISEASE), LUMBOSACRAL: ICD-10-CM

## 2024-01-08 DIAGNOSIS — M47.816 FACET SYNDROME, LUMBAR: ICD-10-CM

## 2024-01-08 DIAGNOSIS — M47.816 LUMBAR SPONDYLOSIS: ICD-10-CM

## 2024-01-08 DIAGNOSIS — M51.16 LUMBAR DISC HERNIATION WITH RADICULOPATHY: ICD-10-CM

## 2024-01-08 DIAGNOSIS — M54.59 MECHANICAL LOW BACK PAIN: ICD-10-CM

## 2024-01-08 DIAGNOSIS — M48.061 LUMBAR FORAMINAL STENOSIS: ICD-10-CM

## 2024-01-08 DIAGNOSIS — M51.37 DDD (DEGENERATIVE DISC DISEASE), LUMBOSACRAL: Primary | ICD-10-CM

## 2024-01-08 DIAGNOSIS — M54.16 LUMBAR RADICULOPATHY: ICD-10-CM

## 2024-01-08 DIAGNOSIS — M54.59 LUMBAR TRIGGER POINT SYNDROME: ICD-10-CM

## 2024-01-08 PROCEDURE — 72110 X-RAY EXAM L-2 SPINE 4/>VWS: CPT | Performed by: STUDENT IN AN ORGANIZED HEALTH CARE EDUCATION/TRAINING PROGRAM

## 2024-01-08 NOTE — TELEPHONE ENCOUNTER
Patient says she has an MRI scheduled for 1/29 and she says they are requesting a physical for her MRI. She wants to schedule for a physical before 1/29. Please advise. Or can she use the one she had done in May of last year

## 2024-01-08 NOTE — H&P
[facility]  Neurological Surgery New Patient Clinic Note    Beryl Miles  9/17/1970  [id number]  PCP: Sanchez Corona MD  Referring Provider: Alex Behar, MD    REASON FOR VISIT:  Low back pain with radiation    HISTORY OF PRESENT ILLNESS:  Beryl Miles is a(n) 53 year old female with hyperlipidemia, GERD who is referred for evaluation of low back pain with radiation.  She reports atraumatic onset of low back pain radiating to the left leg approximately 3 years ago.  She has been dealing with this conservatively.  She describes her pain as intermittently radiating down the left lateral aspect of her thigh, calf, and lateral foot.  Any movement seems to exacerbate this.  Leaning forward certainly exacerbates this as well.  Approximately 1 year ago she had a left interlaminar epidural steroid injection by Dr. Behar which gave her great relief.  Most recently she had a right interlaminal epidural steroid injection that provided her with no relief.  She has had physical therapy in the past, with continuing pain.  She denies any balance difficulties, loss of dexterity, significant bilateral lower extremity weakness, or any red flags for myelopathy or cauda equina syndrome.    PAST MEDICAL HISTORY:  Past Medical History:   Diagnosis Date    Esophageal reflux     High cholesterol     Lumbar spondylosis     Screen for colon cancer 2022    repeat CLN in 10 years     PAST SURGICAL HISTORY:  Past Surgical History:   Procedure Laterality Date    COLONOSCOPY SCREENING - REFERRAL N/A 7/5/2022    Procedure: COLONOSCOPY-SCREENING;  Surgeon: CB Gann MD;  Location: [facility] ENDOSCOPY    HYSTERECTOMY  2017     FAMILY HISTORY:  family history is not on file.    SOCIAL HISTORY:   reports that she has never smoked. She has never used smokeless tobacco. She reports that she does not currently use alcohol. She reports that she does not currently use drugs.    ALLERGIES:  No Known  Allergies    MEDICATIONS:  Current Outpatient Medications on File Prior to Visit   Medication Sig Dispense Refill    omeprazole 20 MG Oral Capsule Delayed Release Take 1 capsule (20 mg total) by mouth daily. 90 capsule 1    rosuvastatin (CRESTOR) 40 MG Oral Tab Take 1 tablet (40 mg total) by mouth nightly. 90 tablet 1    B Complex-Biotin-FA (COMPLEX B-100) Oral Tab CR Take 1 tablet by mouth daily. 120 tablet 0    Vitamin D3, Cholecalciferol, 25 MCG (1000 UT) Oral Tab Take 1 tablet (1,000 Units total) by mouth daily.       Current Facility-Administered Medications on File Prior to Visit   Medication Dose Route Frequency Provider Last Rate Last Admin    lidocaine (Xylocaine) 1 % injection  5 mL Intradermal Once Behar, Alex, MD         REVIEW OF SYSTEMS:  All other systems were reviewed and were negative except for those previously mentioned in the HPI     PHYSICAL EXAMINATION:  General: No acute distress.  Respiratory: Non-labored respirations bilaterally. No audible wheezing  Cardiovascular: Extremities warm and well-perfused.  Abdomen: Soft, nontender, nondistended.   Musculoskeletal: Moves all extremities well, symmetrically.  Extremities: No edema.    NEUROLOGIC EXAMINATION:  Mental status: Alert and oriented x 3  Speech: Clear, fluent  Cranial nerves: PERRLA, EOMI, face symmetric, with normal strength and sensation, tongue and palate midline, SCM 5/5 bilaterally  Motor:     RIGHT  Delt 5/5   Bic 5/5  Tri 5/5   HI 5/5    5/5  IP 5/5   Quad 5/5   Ham 5/5   AT 5/5   EHL 5/5 Sterling 5/5     LEFT    Delt 5/5   Bic 5/5  Tri 5/5   HI 5/5    5/5  IP 5/5   Quad 5/5   Ham 5/5   AT 5/5   EHL 5/5 Sterling 5/5   No pronator drift  Tone: Normal  Atrophy/Fasciculations: None  Sensation: Normal to light touch, symmetric, no neglect  Cerebellar: Normal finger nose finger  Gait: Normal, nondistressed heel toe tandem gait      Reflexes: 2+ throughout, symmetric, no Lily's     IMAGING:  MR lumbar 5/2022: Artifact degraded MRI,  which is suggestive of left L5-S1 foraminal narrowing.  Given the limitations of the study, this is a nondiagnostic study especially when considering surgical planning.    ASSESSMENT:  Ms. Miles presents with signs and symptoms suspicious for a left-sided S1 radiculopathy.  She had an L5-S1 interlaminar epidural steroid injection that helped her about a year ago and provided her with significant relief.  Unfortunately, the pain has returned despite aggressive medical management with diet and physical therapy.  She also had a repeat epidural steroid injection recently which provided her no relief.  I went over her imaging with her in detail.  While her MRI suggests pathology L5-S1 and her symptoms are classic for this she had a very artifact degraded MRI but it was nondiagnostic for presurgical planning.  I suggested we obtain a repeat MRI with high resolution that is not an open MRI.  She will need IV sedation for this as she is anxious about it.  I will also obtain flexion-extension x-rays to rule out mechanical instability.  She will return to see me after the studies are completed for further evaluation and possible presurgical discussions if pathology is confirmed.    Plan:  -MR lumbar with IV sedation  -XR lumbar flexion-extension  -RTC 1 month or sooner if above completed    Braulio Sampson MD  Neurological Surgery    [address], [address]  [place] [address]  [phone]  Pager [phone]  1/8/2024 2:51 PM      This note was created using a voice-recognition transcribing system. Incorrect words or phrases may have been missed during proofreading. Please interpret accordingly.    Total Time    New Patient Total Time       60  minutes.      Activities       Preparing to see the patient (chart/tests/imaging review).       Obtaining and/or reviewing separately obtained history.       Performing a medically appropriate examination and/or evaluation.        Counseling and educating the patient/family/caregiver.       Ordering medications, tests, or procedures.       Referring and communicating with other health care professionals (when not separately reported).       Documenting clincal information in the electronic or other health record.       Independently interpreting results (not separately reported).    Communicating results to the patient/family/caregiver.    Care coordination (not separately reported).

## 2024-01-17 ENCOUNTER — OFFICE VISIT (OUTPATIENT)
Dept: INTERNAL MEDICINE CLINIC | Facility: CLINIC | Age: 54
End: 2024-01-17
Payer: COMMERCIAL

## 2024-01-17 ENCOUNTER — LAB ENCOUNTER (OUTPATIENT)
Dept: LAB | Facility: REFERENCE LAB | Age: 54
End: 2024-01-17
Attending: INTERNAL MEDICINE
Payer: COMMERCIAL

## 2024-01-17 ENCOUNTER — LAB ENCOUNTER (OUTPATIENT)
Dept: LAB | Facility: HOSPITAL | Age: 54
End: 2024-01-17
Attending: INTERNAL MEDICINE
Payer: COMMERCIAL

## 2024-01-17 VITALS
OXYGEN SATURATION: 98 % | TEMPERATURE: 99 F | HEART RATE: 70 BPM | WEIGHT: 186.81 LBS | BODY MASS INDEX: 34.38 KG/M2 | HEIGHT: 61.85 IN | DIASTOLIC BLOOD PRESSURE: 72 MMHG | SYSTOLIC BLOOD PRESSURE: 122 MMHG

## 2024-01-17 DIAGNOSIS — R63.5 WEIGHT GAIN: ICD-10-CM

## 2024-01-17 DIAGNOSIS — Z01.818 PREOP EXAMINATION: ICD-10-CM

## 2024-01-17 DIAGNOSIS — Z12.31 SCREENING MAMMOGRAM FOR BREAST CANCER: ICD-10-CM

## 2024-01-17 DIAGNOSIS — Z01.818 PREOP EXAMINATION: Primary | ICD-10-CM

## 2024-01-17 LAB
ALBUMIN SERPL-MCNC: 4.4 G/DL (ref 3.2–4.8)
ALBUMIN/GLOB SERPL: 1.7 {RATIO} (ref 1–2)
ALP LIVER SERPL-CCNC: 76 U/L
ALT SERPL-CCNC: 13 U/L
ANION GAP SERPL CALC-SCNC: 6 MMOL/L (ref 0–18)
AST SERPL-CCNC: 16 U/L (ref ?–34)
BILIRUB SERPL-MCNC: 0.5 MG/DL (ref 0.3–1.2)
BUN BLD-MCNC: 14 MG/DL (ref 9–23)
BUN/CREAT SERPL: 20.3 (ref 10–20)
CALCIUM BLD-MCNC: 9.1 MG/DL (ref 8.7–10.4)
CHLORIDE SERPL-SCNC: 108 MMOL/L (ref 98–112)
CO2 SERPL-SCNC: 28 MMOL/L (ref 21–32)
CREAT BLD-MCNC: 0.69 MG/DL
EGFRCR SERPLBLD CKD-EPI 2021: 105 ML/MIN/1.73M2 (ref 60–?)
EST. AVERAGE GLUCOSE BLD GHB EST-MCNC: 111 MG/DL (ref 68–126)
FASTING STATUS PATIENT QL REPORTED: NO
GLOBULIN PLAS-MCNC: 2.6 G/DL (ref 2.8–4.4)
GLUCOSE BLD-MCNC: 82 MG/DL (ref 70–99)
HBA1C MFR BLD: 5.5 % (ref ?–5.7)
OSMOLALITY SERPL CALC.SUM OF ELEC: 294 MOSM/KG (ref 275–295)
POTASSIUM SERPL-SCNC: 3.7 MMOL/L (ref 3.5–5.1)
PROT SERPL-MCNC: 7 G/DL (ref 5.7–8.2)
SODIUM SERPL-SCNC: 142 MMOL/L (ref 136–145)

## 2024-01-17 PROCEDURE — 80053 COMPREHEN METABOLIC PANEL: CPT

## 2024-01-17 PROCEDURE — 93005 ELECTROCARDIOGRAM TRACING: CPT

## 2024-01-17 PROCEDURE — 83036 HEMOGLOBIN GLYCOSYLATED A1C: CPT

## 2024-01-17 PROCEDURE — 36415 COLL VENOUS BLD VENIPUNCTURE: CPT

## 2024-01-17 PROCEDURE — 93010 ELECTROCARDIOGRAM REPORT: CPT | Performed by: INTERNAL MEDICINE

## 2024-01-17 RX ORDER — OMEPRAZOLE 20 MG/1
20 CAPSULE, DELAYED RELEASE ORAL DAILY
Qty: 90 CAPSULE | Refills: 1 | Status: SHIPPED | OUTPATIENT
Start: 2024-01-17

## 2024-01-19 LAB
ATRIAL RATE: 57 BPM
P AXIS: 32 DEGREES
P-R INTERVAL: 166 MS
Q-T INTERVAL: 424 MS
QRS DURATION: 76 MS
QTC CALCULATION (BEZET): 412 MS
R AXIS: -7 DEGREES
T AXIS: 17 DEGREES
VENTRICULAR RATE: 57 BPM

## 2024-01-22 NOTE — PROGRESS NOTES
[facility]  Return Patient Progress Note      HPI:   No chief complaint on file.      Beryl Miles is a 53 year old female presenting for:Follow up.      Has a significant  has a past medical history of Esophageal reflux, High cholesterol, Lumbar spondylosis, and Screen for colon cancer (2022).      Here today for for a preprocedure clearance.  She is scheduled to undergo a Lumbar MRI with anesthesia on 1/29/2024    Following with Dr. Behar and Dr. Sampson.       Reports no history of CVA/TIA, CAD/CHF, Renal Insufficiency, DVT/PE, Diabetes on insulin.  No adverse reaction to anesthesia in the past.      No recent chest pain, SOB/Dyspnea or decreasing exercise tolerance.      History of problems with anesthesia: None  History of abnormal/excessive bleeding with surgery: None  EDUARDA present: No  Chest pain or shortness of breath at rest: No  Chest pain or shortness of breath with exertion: No  Can walk > 2 blocks w/o rest or climb > 1 flight of stairs: Yes  Recent cardiac evaluation: N/A  History of illicit drug use: None          Labs:   CMP:  Lab Results   Component Value Date/Time     01/17/2024 04:51 PM    K 3.7 01/17/2024 04:51 PM     01/17/2024 04:51 PM    CO2 28.0 01/17/2024 04:51 PM    CREATSERUM 0.69 01/17/2024 04:51 PM    CA 9.1 01/17/2024 04:51 PM    GLU 82 01/17/2024 04:51 PM    TP 7.0 01/17/2024 04:51 PM    ALB 4.4 01/17/2024 04:51 PM    ALKPHO 76 01/17/2024 04:51 PM    AST 16 01/17/2024 04:51 PM    ALT 13 01/17/2024 04:51 PM    BILT 0.5 01/17/2024 04:51 PM    TSH 0.901 05/25/2023 11:26 AM    T4F 1.0 07/22/2021 08:42 AM        Hemoglobin A1C, Microalbumin  Lab Results   Component Value Date/Time    A1C 5.5 01/17/2024 04:51 PM        Lipid panel  Lab Results   Component Value Date/Time    CHOLEST 187 05/25/2023 11:26 AM    HDL 44 05/25/2023 11:26 AM    TRIG 301 (H) 05/25/2023 11:26 AM    LDL 93 05/25/2023 11:26 AM    NONHDLC 143 (H) 05/25/2023 11:26 AM         Medications:  Current Outpatient Medications   Medication Sig Dispense Refill    omeprazole 20 MG Oral Capsule Delayed Release Take 1 capsule (20 mg total) by mouth daily. 90 capsule 1    rosuvastatin (CRESTOR) 40 MG Oral Tab Take 1 tablet (40 mg total) by mouth nightly. 90 tablet 1    B Complex-Biotin-FA (COMPLEX B-100) Oral Tab CR Take 1 tablet by mouth daily. 120 tablet 0    Vitamin D3, Cholecalciferol, 25 MCG (1000 UT) Oral Tab Take 1 tablet (1,000 Units total) by mouth daily.        PMH:  Past Medical History:   Diagnosis Date    Esophageal reflux     High cholesterol     Lumbar spondylosis     Screen for colon cancer 2022    repeat CLN in 10 years         PSH:  Past Surgical History:   Procedure Laterality Date    COLONOSCOPY SCREENING - REFERRAL N/A 7/5/2022    Procedure: COLONOSCOPY-SCREENING;  Surgeon: CB Gann MD;  Location: [facility] ENDOSCOPY    HYSTERECTOMY  2017       Allergies:  No Known Allergies   Social History:  Social History     Socioeconomic History    Marital status:    Tobacco Use    Smoking status: Never    Smokeless tobacco: Never   Substance and Sexual Activity    Alcohol use: Not Currently    Drug use: Not Currently   Other Topics Concern    Caffeine Concern Yes     Comment: coffee daily    Exercise Yes     Comment: cardio   Social History Narrative    The patient does not use an assistive device..      The patient does live in a home with stairs.          Family History:  Family History   Problem Relation Age of Onset    Breast Cancer Neg               REVIEW OF SYSTEMS:   Review of Systems   Constitutional:  Negative for chills, fatigue, fever and unexpected weight change.   HENT:  Negative for congestion, ear pain, hearing loss, rhinorrhea, sinus pain and sore throat.    Eyes:  Negative for pain, redness and visual disturbance.   Respiratory:  Negative for apnea, cough, chest tightness, shortness of breath and wheezing.    Cardiovascular:  Negative for chest pain,  palpitations and leg swelling.   Gastrointestinal:  Negative for abdominal distention, abdominal pain, blood in stool, constipation and nausea.   Endocrine: Negative for cold intolerance, heat intolerance and polyuria.   Genitourinary:  Negative for dysuria, hematuria and urgency.   Musculoskeletal:  Positive for back pain. Negative for arthralgias, gait problem, joint swelling, myalgias and neck pain.   Skin:  Negative for rash and wound.   Allergic/Immunologic: Negative for food allergies and immunocompromised state.   Neurological:  Negative for dizziness, seizures, facial asymmetry, speech difficulty, weakness, light-headedness, numbness and headaches.   Hematological:  Negative for adenopathy. Does not bruise/bleed easily.   Psychiatric/Behavioral:  Negative for behavioral problems, sleep disturbance and suicidal ideas. The patient is not nervous/anxious.             PHYSICAL EXAM:   /72   Pulse 70   Temp 98.8 °F (37.1 °C)   Ht 5' 1.85\" (1.571 m)   Wt 186 lb 12.8 oz (84.7 kg)   SpO2 98%   BMI 34.33 kg/m²  Estimated body mass index is 34.33 kg/m² as calculated from the following:    Height as of this encounter: 5' 1.85\" (1.571 m).    Weight as of this encounter: 186 lb 12.8 oz (84.7 kg).     Wt Readings from Last 3 Encounters:   01/17/24 186 lb 12.8 oz (84.7 kg)   01/08/24 185 lb (83.9 kg)   12/19/23 185 lb (83.9 kg)       Physical Exam  Vitals reviewed.   Constitutional:       General: She is not in acute distress.     Appearance: She is well-developed.   HENT:      Head: Normocephalic and atraumatic.   Eyes:      Conjunctiva/sclera: Conjunctivae normal.      Pupils: Pupils are equal, round, and reactive to light.   Neck:      Thyroid: No thyromegaly.   Cardiovascular:      Rate and Rhythm: Normal rate and regular rhythm.      Heart sounds: Normal heart sounds, S1 normal and S2 normal. No murmur heard.     No friction rub. No gallop.   Pulmonary:      Effort: Pulmonary effort is normal. No  respiratory distress.      Breath sounds: Normal breath sounds. No wheezing or rales.   Chest:      Chest wall: No tenderness.   Abdominal:      General: Bowel sounds are normal. There is no distension.      Palpations: Abdomen is soft. There is no mass.      Tenderness: There is no abdominal tenderness. There is no guarding or rebound.   Musculoskeletal:         General: No tenderness. Normal range of motion.      Cervical back: Normal range of motion.   Lymphadenopathy:      Cervical: No cervical adenopathy.   Skin:     General: Skin is warm.      Findings: No erythema or rash.   Neurological:      Mental Status: She is alert and oriented to person, place, and time.      Cranial Nerves: No cranial nerve deficit.      Deep Tendon Reflexes: Reflexes are normal and symmetric.   Psychiatric:         Behavior: Behavior normal.         Thought Content: Thought content normal.         Judgment: Judgment normal.               ASSESSMENT AND PLAN:   Patient is a 53 year old female who presents primarily presents for:    (Z01.818) Preop examination  (primary encounter diagnosis)  Lab reviewed including CMP and Hemoglobin A1C.  No abnormality.     EKG reviewed.  NSR.  No abnormality compared to previous.      She meets no risk factor criteria according to revised cardiac risk index.  She is an acceptable risk to proceed with MRI under anesthesia.      (Z12.31) Screening mammogram for breast cancer  Plan: [facility] 2D+3D SCREENING BILAT         (CPT=77067/08022)            (R63.5) Weight gain  Plan: Hemoglobin A1C (Glycohemoglobin) [E]                  Meds & Refills for this Visit:  Requested Prescriptions     Signed Prescriptions Disp Refills    omeprazole 20 MG Oral Capsule Delayed Release 90 capsule 1     Sig: Take 1 capsule (20 mg total) by mouth daily.       Orders Placed This Encounter   Procedures    Comp Metabolic Panel (14) [E]    Hemoglobin A1C (Glycohemoglobin) [E]       Imaging & Consults:  [facility] 2D+3D  SCREENING BILAT (CPT=77067/58356)          No follow-ups on file.  Important follow up notes/labs for next visit      Patient indicates understanding of the above recommendations and agrees to the above plan.  Reasurrance and education provided. All questions answered.    Notified to call with any questions, complications, allergies, or worsening or changing symptoms as well as any side effects or complications from the treatments .  Red flags/ ER precautions discussed.    If diagnostic labs or imaging ordered advised patient to contact my office for results  24-48 hours after completion    This note was dictated using dragon speech recognition transcription software.  Typographical and transcription errors may be present.  Please call if any questions.             Sanchez Corona MD  [facility]        [phone]

## 2024-01-23 ENCOUNTER — TELEPHONE (OUTPATIENT)
Dept: INTERNAL MEDICINE CLINIC | Facility: CLINIC | Age: 54
End: 2024-01-23

## 2024-01-23 NOTE — TELEPHONE ENCOUNTER
Patient called and results were dicussed on her EKG and also labs, she vocalized understanding on the results.

## 2024-01-23 NOTE — TELEPHONE ENCOUNTER
[email or web address]      ----- Message from Sanchez Corona MD sent at 1/22/2024  5:33 PM CST -----  Please inform her labs and EKG with no sign abnormality.   See me in 3-6 months.

## 2024-01-25 RX ORDER — DICLOFENAC SODIUM 75 MG/1
75 TABLET, DELAYED RELEASE ORAL 2 TIMES DAILY
COMMUNITY

## 2024-01-25 RX ORDER — NAPROXEN 500 MG/1
500 TABLET ORAL 2 TIMES DAILY WITH MEALS
COMMUNITY

## 2024-01-25 RX ORDER — ACETAMINOPHEN 500 MG
1000 TABLET ORAL ONCE
Status: CANCELLED | OUTPATIENT
Start: 2024-01-25 | End: 2024-01-25

## 2024-01-25 RX ORDER — IBUPROFEN 200 MG
400 TABLET ORAL EVERY 6 HOURS PRN
COMMUNITY

## 2024-01-29 ENCOUNTER — ANESTHESIA (OUTPATIENT)
Dept: MRI IMAGING | Facility: HOSPITAL | Age: 54
End: 2024-01-29
Payer: COMMERCIAL

## 2024-01-29 ENCOUNTER — HOSPITAL ENCOUNTER (OUTPATIENT)
Dept: MRI IMAGING | Facility: HOSPITAL | Age: 54
Discharge: HOME | End: 2024-01-29
Attending: STUDENT IN AN ORGANIZED HEALTH CARE EDUCATION/TRAINING PROGRAM
Payer: COMMERCIAL

## 2024-01-29 ENCOUNTER — ANESTHESIA EVENT (OUTPATIENT)
Dept: MRI IMAGING | Facility: HOSPITAL | Age: 54
End: 2024-01-29
Payer: COMMERCIAL

## 2024-01-29 VITALS
OXYGEN SATURATION: 98 % | HEIGHT: 61 IN | SYSTOLIC BLOOD PRESSURE: 102 MMHG | DIASTOLIC BLOOD PRESSURE: 65 MMHG | HEART RATE: 65 BPM | WEIGHT: 181 LBS | TEMPERATURE: 98 F | BODY MASS INDEX: 34.17 KG/M2 | RESPIRATION RATE: 16 BRPM

## 2024-01-29 DIAGNOSIS — M51.37 DDD (DEGENERATIVE DISC DISEASE), LUMBOSACRAL: ICD-10-CM

## 2024-01-29 DIAGNOSIS — M48.061 LUMBAR FORAMINAL STENOSIS: ICD-10-CM

## 2024-01-29 DIAGNOSIS — M54.59 LUMBAR TRIGGER POINT SYNDROME: ICD-10-CM

## 2024-01-29 DIAGNOSIS — M47.816 LUMBAR SPONDYLOSIS: ICD-10-CM

## 2024-01-29 DIAGNOSIS — M51.16 LUMBAR DISC HERNIATION WITH RADICULOPATHY: ICD-10-CM

## 2024-01-29 DIAGNOSIS — M51.36 BULGE OF LUMBAR DISC WITHOUT MYELOPATHY: ICD-10-CM

## 2024-01-29 DIAGNOSIS — M54.59 MECHANICAL LOW BACK PAIN: ICD-10-CM

## 2024-01-29 DIAGNOSIS — M54.16 LUMBAR RADICULOPATHY: ICD-10-CM

## 2024-01-29 DIAGNOSIS — M47.816 FACET SYNDROME, LUMBAR: ICD-10-CM

## 2024-01-29 PROCEDURE — 72148 MRI LUMBAR SPINE W/O DYE: CPT | Performed by: STUDENT IN AN ORGANIZED HEALTH CARE EDUCATION/TRAINING PROGRAM

## 2024-01-29 RX ORDER — MORPHINE SULFATE 4 MG/ML
4 INJECTION, SOLUTION INTRAMUSCULAR; INTRAVENOUS EVERY 10 MIN PRN
Status: DISCONTINUED | OUTPATIENT
Start: 2024-01-29 | End: 2024-01-31

## 2024-01-29 RX ORDER — HYDROMORPHONE HYDROCHLORIDE 1 MG/ML
0.2 INJECTION, SOLUTION INTRAMUSCULAR; INTRAVENOUS; SUBCUTANEOUS EVERY 5 MIN PRN
Status: DISCONTINUED | OUTPATIENT
Start: 2024-01-29 | End: 2024-01-31

## 2024-01-29 RX ORDER — SODIUM CHLORIDE, SODIUM LACTATE, POTASSIUM CHLORIDE, CALCIUM CHLORIDE 600; 310; 30; 20 MG/100ML; MG/100ML; MG/100ML; MG/100ML
INJECTION, SOLUTION INTRAVENOUS CONTINUOUS
Status: DISCONTINUED | OUTPATIENT
Start: 2024-01-29 | End: 2024-01-31

## 2024-01-29 RX ORDER — MORPHINE SULFATE 4 MG/ML
2 INJECTION, SOLUTION INTRAMUSCULAR; INTRAVENOUS EVERY 10 MIN PRN
Status: DISCONTINUED | OUTPATIENT
Start: 2024-01-29 | End: 2024-01-31

## 2024-01-29 RX ORDER — HYDROMORPHONE HYDROCHLORIDE 1 MG/ML
0.4 INJECTION, SOLUTION INTRAMUSCULAR; INTRAVENOUS; SUBCUTANEOUS EVERY 5 MIN PRN
Status: DISCONTINUED | OUTPATIENT
Start: 2024-01-29 | End: 2024-01-31

## 2024-01-29 RX ORDER — MORPHINE SULFATE 10 MG/ML
6 INJECTION, SOLUTION INTRAMUSCULAR; INTRAVENOUS EVERY 10 MIN PRN
Status: DISCONTINUED | OUTPATIENT
Start: 2024-01-29 | End: 2024-01-31

## 2024-01-29 RX ORDER — NALOXONE HYDROCHLORIDE 0.4 MG/ML
80 INJECTION, SOLUTION INTRAMUSCULAR; INTRAVENOUS; SUBCUTANEOUS AS NEEDED
Status: ACTIVE | OUTPATIENT
Start: 2024-01-29 | End: 2024-01-29

## 2024-01-29 RX ORDER — FAMOTIDINE 20 MG/1
20 TABLET, FILM COATED ORAL ONCE
Status: DISCONTINUED | OUTPATIENT
Start: 2024-01-29 | End: 2024-01-31

## 2024-01-29 RX ORDER — METOCLOPRAMIDE HYDROCHLORIDE 5 MG/ML
10 INJECTION INTRAMUSCULAR; INTRAVENOUS ONCE
Status: DISCONTINUED | OUTPATIENT
Start: 2024-01-29 | End: 2024-01-31

## 2024-01-29 RX ORDER — HYDROMORPHONE HYDROCHLORIDE 1 MG/ML
0.6 INJECTION, SOLUTION INTRAMUSCULAR; INTRAVENOUS; SUBCUTANEOUS EVERY 5 MIN PRN
Status: DISCONTINUED | OUTPATIENT
Start: 2024-01-29 | End: 2024-01-31

## 2024-01-29 RX ORDER — FAMOTIDINE 10 MG/ML
20 INJECTION, SOLUTION INTRAVENOUS ONCE
Status: DISCONTINUED | OUTPATIENT
Start: 2024-01-29 | End: 2024-01-31

## 2024-01-29 RX ORDER — EPHEDRINE SULFATE 50 MG/ML
INJECTION INTRAVENOUS AS NEEDED
Status: DISCONTINUED | OUTPATIENT
Start: 2024-01-29 | End: 2024-01-29 | Stop reason: SURG

## 2024-01-29 RX ORDER — ONDANSETRON 2 MG/ML
INJECTION INTRAMUSCULAR; INTRAVENOUS AS NEEDED
Status: DISCONTINUED | OUTPATIENT
Start: 2024-01-29 | End: 2024-01-29 | Stop reason: SURG

## 2024-01-29 RX ORDER — METOCLOPRAMIDE 10 MG/1
10 TABLET ORAL ONCE
Status: DISCONTINUED | OUTPATIENT
Start: 2024-01-29 | End: 2024-01-31

## 2024-01-29 RX ORDER — DEXAMETHASONE SODIUM PHOSPHATE 4 MG/ML
VIAL (ML) INJECTION AS NEEDED
Status: DISCONTINUED | OUTPATIENT
Start: 2024-01-29 | End: 2024-01-29 | Stop reason: SURG

## 2024-01-29 RX ADMIN — SODIUM CHLORIDE, SODIUM LACTATE, POTASSIUM CHLORIDE, CALCIUM CHLORIDE: 600; 310; 30; 20 INJECTION, SOLUTION INTRAVENOUS at 13:56:00

## 2024-01-29 RX ADMIN — SODIUM CHLORIDE, SODIUM LACTATE, POTASSIUM CHLORIDE, CALCIUM CHLORIDE: 600; 310; 30; 20 INJECTION, SOLUTION INTRAVENOUS at 13:36:00

## 2024-01-29 RX ADMIN — ONDANSETRON 4 MG: 2 INJECTION INTRAMUSCULAR; INTRAVENOUS at 14:14:00

## 2024-01-29 RX ADMIN — EPHEDRINE SULFATE 5 MG: 50 INJECTION INTRAVENOUS at 14:24:00

## 2024-01-29 RX ADMIN — DEXAMETHASONE SODIUM PHOSPHATE 4 MG: 4 MG/ML VIAL (ML) INJECTION at 14:14:00

## 2024-01-29 NOTE — ADDENDUM NOTE
Addendum  created 01/29/24 1457 by Savanna Lambert MD    Review and Sign - Ready for Procedure

## 2024-01-29 NOTE — ANESTHESIA PROCEDURE NOTES
Airway  Date/Time: 1/29/2024 2:10 PM  Urgency: Elective    Airway not difficult    General Information and Staff    Patient location during procedure: OR  Anesthesiologist: Savanna Lambert MD  Performed: anesthesiologist   Performed by: Savanna Lambert MD  Authorized by: Savanna Lambert MD      Indications and Patient Condition  Indications for airway management: anesthesia  Spontaneous Ventilation: absent  Sedation level: deep  Preoxygenated: yes  Patient position: sniffing  Mask difficulty assessment: 1 - vent by mask    Final Airway Details  Final airway type: supraglottic airway      Successful airway: classic  Size 4       Number of attempts at approach: 1  Ventilation between attempts: none  Number of other approaches attempted: 0

## 2024-01-29 NOTE — ANESTHESIA PREPROCEDURE EVALUATION
Anesthesia PreOp Note    HPI:     Beryl Miles is a 53 year old female who presents for preoperative consultation requested by: * No surgeons listed *    Date of Surgery: 1/29/2024    * No procedures listed *  Indication: * No pre-op diagnosis entered *    Relevant Problems   No relevant active problems       NPO:  Last Liquid Consumption Date: 01/29/24  Last Liquid Consumption Time: 0700 (SIPS WITH WATER)  Last Solid Consumption Date: 01/28/24  Last Solid Consumption Time: 2000  Last Liquid Consumption Date: 01/29/24          History Review:  Patient Active Problem List    Diagnosis Date Noted    Elbow strain, left, initial encounter 01/16/2023    Internal hemorrhoids     Chronic idiopathic constipation 04/08/2022    Gastroesophageal reflux disease 04/08/2022    Mechanical low back pain 11/15/2021    Lumbar spondylosis 11/15/2021    Lumbar disc herniation with radiculopathy 11/15/2021    Lumbar foraminal stenosis 11/15/2021    Bulge of lumbar disc without myelopathy 11/15/2021    DDD (degenerative disc disease), lumbosacral 11/15/2021    Bilateral carpal tunnel syndrome 11/15/2021    Elbow pain, chronic, right 11/15/2021    Numbness in both hands 11/15/2021    Hyperlipidemia 11/15/2021    NSAID induced gastritis 11/15/2021    Medial epicondylitis of elbow, left 11/15/2021       Past Medical History:   Diagnosis Date    Esophageal reflux     High cholesterol     Lumbar spondylosis     Migraines     PONV (postoperative nausea and vomiting)     Screen for colon cancer 2022    repeat CLN in 10 years       Past Surgical History:   Procedure Laterality Date    COLONOSCOPY SCREENING - REFERRAL N/A 7/5/2022    Procedure: COLONOSCOPY-SCREENING;  Surgeon: CB Gann MD;  Location: [facility] ENDOSCOPY    HYSTERECTOMY  2017       (Not in a hospital admission)    Current Outpatient Medications Ordered in Epic   Medication Sig Dispense Refill    naproxen 500 MG Oral Tab Take 1 tablet (500 mg total) by mouth 2 (two)  times daily with meals. For leg pain      diclofenac 75 MG Oral Tab EC Take 1 tablet (75 mg total) by mouth 2 (two) times daily. For pain leg      omeprazole 20 MG Oral Capsule Delayed Release Take 1 capsule (20 mg total) by mouth daily. (Patient taking differently: Take 1 capsule (20 mg total) by mouth every morning.) 90 capsule 1    rosuvastatin (CRESTOR) 40 MG Oral Tab Take 1 tablet (40 mg total) by mouth nightly. 90 tablet 1    B Complex-Biotin-FA (COMPLEX B-100) Oral Tab CR Take 1 tablet by mouth daily. 120 tablet 0    Vitamin D3, Cholecalciferol, 25 MCG (1000 UT) Oral Tab Take 1 tablet (1,000 Units total) by mouth daily.      ibuprofen 200 MG Oral Tab Take 2 tablets (400 mg total) by mouth every 6 (six) hours as needed for Pain (For Migraine).      GARLIC OR Take 1 tablet by mouth every morning.       Current Facility-Administered Medications Ordered in Epic   Medication Dose Route Frequency Provider Last Rate Last Admin    lactated ringers infusion   Intravenous Continuous Braulio Sampson MD 20 mL/hr at 01/29/24 1336 New Bag at 01/29/24 1336    famotidine (Pepcid) tab 20 mg  20 mg Oral Once Braulio Sampson MD        Or    famotidine (Pepcid) 20 mg/2mL injection 20 mg  20 mg Intravenous Once Braulio Sampson MD        metoclopramide (Reglan) tab 10 mg  10 mg Oral Once Braulio Sampson MD        Or    metoclopramide (Reglan) 5 mg/mL injection 10 mg  10 mg Intravenous Once Braulio Sampson MD           No Known Allergies    Family History   Problem Relation Age of Onset    No Known Problems Father     No Known Problems Mother     Breast Cancer Neg      Social History     Socioeconomic History    Marital status:    Tobacco Use    Smoking status: Never    Smokeless tobacco: Never   Vaping Use    Vaping Use: Never used   Substance and Sexual Activity    Alcohol use: Never    Drug use: Never   Other Topics Concern    Caffeine Concern Yes     Comment: coffee daily    Exercise Yes     Comment: cardio        Available pre-op labs reviewed.     Lab Results   Component Value Date     01/17/2024    K 3.7 01/17/2024     01/17/2024    CO2 28.0 01/17/2024    BUN 14 01/17/2024    CREATSERUM 0.69 01/17/2024    GLU 82 01/17/2024    CA 9.1 01/17/2024          Vital Signs:  Body mass index is 34.2 kg/m².   height is 1.549 m (5' 1\") and weight is 82.1 kg (181 lb). Her oral temperature is 97.7 °F (36.5 °C). Her blood pressure is 98/66 and her pulse is 63. Her respiration is 18 and oxygen saturation is 96%.   Vitals:    01/25/24 1534 01/29/24 1322   BP:  98/66   Pulse:  63   Resp:  18   Temp:  97.7 °F (36.5 °C)   TempSrc:  Oral   SpO2:  96%   Weight: 81.6 kg (180 lb) 82.1 kg (181 lb)   Height: 1.549 m (5' 1\")         Anesthesia Evaluation     Patient summary reviewed and Nursing notes reviewed    History of anesthetic complications   Airway   Mallampati: II  TM distance: >3 FB  Neck ROM: full  Dental      Pulmonary - normal exam   Cardiovascular - normal exam  Exercise tolerance: good    NYHA Classification: I  ECG reviewed  ROS comment:   Narrative  Performed by: MUSE    Sinus bradycardia  Low voltage QRS, consider pulmonary disease, pericardial effusion, or normal variant  Abnormal ECG  No previous ECGs found in [facility]  Confirmed by XAVIER PRUETT JORDAN ([id number]) on 1/19/2024 4:46:36 PM    Specimen Collected: 01/17/24 16:55          Neuro/Psych    (+)  neuromuscular disease,        GI/Hepatic/Renal    (+) GERD well controlled    Endo/Other    (-) diabetes mellitus  Abdominal  - normal exam                 Anesthesia Plan:   ASA:  2  Plan:   General  Airway:  LMA  Informed Consent Plan and Risks Discussed With:  Patient  Consent Comment: Medical professional is interpreting   Discussed plan with:  Attending and surgeon  Provider Attestation (if preop done by other):  GA/ LMA PONV,dental damages etc      I have informed Beryl Miles and/or legal guardian or family member of the nature of the anesthetic plan,  benefits, risks including possible dental damage if relevant, major complications, and any alternative forms of anesthetic management.   All of the patient's questions were answered to the best of my ability. The patient desires the anesthetic management as planned.  JULI JONES MD  1/29/2024 1:48 PM  Present on Admission:  **None**

## 2024-01-29 NOTE — ANESTHESIA POSTPROCEDURE EVALUATION
Patient: Beryl Miles    Procedure Summary       Date: 01/29/24 Room / Location: [facility] MRI; [facility] Post Anesthesia Care Unit    Anesthesia Start: 1404 Anesthesia Stop:     Procedure: MRI SPINE LUMBAR (CPT=72148) Diagnosis:       DDD (degenerative disc disease), lumbosacral      Mechanical low back pain      Lumbar radiculopathy      Lumbar spondylosis      Lumbar foraminal stenosis      Bulge of lumbar disc without myelopathy      Lumbar disc herniation with radiculopathy      Facet syndrome, lumbar      Lumbar trigger point syndrome    Scheduled Providers:  Anesthesiologist: Savanna Jones MD    Anesthesia Type: general ASA Status: 2            Anesthesia Type: No value filed.    Vitals Value Taken Time   /70 01/29/24 1451   Temp 97.2 °F (36.2 °C) 01/29/24 1451   Pulse 71 01/29/24 1451   Resp 14 01/29/24 1451   SpO2 97 % 01/29/24 1451       [facility] AN Post Evaluation:   Patient Evaluated in PACU  Patient Participation: complete - patient participated  Level of Consciousness: awake  Pain Score: 0  Pain Management: adequate  Airway Patency:patent  Dental exam unchanged from preop  Yes    Cardiovascular Status: stable  Postoperative Hydration stable      SAVANNA JONES MD  1/29/2024 2:52 PM

## 2024-02-01 ENCOUNTER — TELEPHONE (OUTPATIENT)
Dept: SURGERY | Facility: CLINIC | Age: 54
End: 2024-02-01

## 2024-02-01 ENCOUNTER — OFFICE VISIT (OUTPATIENT)
Dept: SURGERY | Facility: CLINIC | Age: 54
End: 2024-02-01
Payer: COMMERCIAL

## 2024-02-01 VITALS
DIASTOLIC BLOOD PRESSURE: 82 MMHG | WEIGHT: 181 LBS | HEIGHT: 61 IN | HEART RATE: 65 BPM | SYSTOLIC BLOOD PRESSURE: 116 MMHG | BODY MASS INDEX: 34.17 KG/M2

## 2024-02-01 DIAGNOSIS — M48.061 LUMBAR FORAMINAL STENOSIS: ICD-10-CM

## 2024-02-01 DIAGNOSIS — M54.16 LUMBAR RADICULOPATHY: ICD-10-CM

## 2024-02-01 DIAGNOSIS — M54.59 MECHANICAL LOW BACK PAIN: ICD-10-CM

## 2024-02-01 DIAGNOSIS — M51.36 BULGE OF LUMBAR DISC WITHOUT MYELOPATHY: ICD-10-CM

## 2024-02-01 DIAGNOSIS — M51.37 DDD (DEGENERATIVE DISC DISEASE), LUMBOSACRAL: Primary | ICD-10-CM

## 2024-02-01 DIAGNOSIS — M54.59 LUMBAR TRIGGER POINT SYNDROME: ICD-10-CM

## 2024-02-01 DIAGNOSIS — M51.16 LUMBAR DISC HERNIATION WITH RADICULOPATHY: ICD-10-CM

## 2024-02-01 DIAGNOSIS — M47.816 LUMBAR SPONDYLOSIS: ICD-10-CM

## 2024-02-01 DIAGNOSIS — M47.816 FACET SYNDROME, LUMBAR: ICD-10-CM

## 2024-02-01 PROCEDURE — 99214 OFFICE O/P EST MOD 30 MIN: CPT | Performed by: STUDENT IN AN ORGANIZED HEALTH CARE EDUCATION/TRAINING PROGRAM

## 2024-02-01 PROCEDURE — 3079F DIAST BP 80-89 MM HG: CPT | Performed by: STUDENT IN AN ORGANIZED HEALTH CARE EDUCATION/TRAINING PROGRAM

## 2024-02-01 PROCEDURE — 3008F BODY MASS INDEX DOCD: CPT | Performed by: STUDENT IN AN ORGANIZED HEALTH CARE EDUCATION/TRAINING PROGRAM

## 2024-02-01 PROCEDURE — 3074F SYST BP LT 130 MM HG: CPT | Performed by: STUDENT IN AN ORGANIZED HEALTH CARE EDUCATION/TRAINING PROGRAM

## 2024-02-01 NOTE — PROGRESS NOTES
[facility]  Neurological Surgery Established Patient Clinic Note    Beryl Miles  9/17/1970  [id number]  PCP: Sanchez Corona MD  Referring Provider: Alex Behar, MD     REASON FOR VISIT:  Low back pain with radiation    HISTORY OF PRESENT ILLNESS 1/8/2024:  Beryl Miles is a(n) 53 year old female  with hyperlipidemia, GERD who is referred for evaluation of low back pain with radiation.  She reports atraumatic onset of low back pain radiating to the left leg approximately 3 years ago.  She has been dealing with this conservatively.  She describes her pain as intermittently radiating down the left lateral aspect of her thigh, calf, and lateral foot.  Any movement seems to exacerbate this.  Leaning forward certainly exacerbates this as well.  Approximately 1 year ago she had a left interlaminar epidural steroid injection by Dr. Behar which gave her great relief.  Most recently she had a right interlaminal epidural steroid injection that provided her with no relief.  She has had physical therapy in the past, with continuing pain.  She denies any balance difficulties, loss of dexterity, significant bilateral lower extremity weakness, or any red flags for myelopathy or cauda equina syndrome.     INTERVAL HISTORY 2/1/2024:  Beryl Miles returns to clinic today for continued spinal follow-up.  She was last seen on 1/8/2024.  She reports stable, persistent left-sided radiating pain.  It continues to affect her posterior thigh, calf, and plantar aspect of her foot.  She has associated numbness and tingling of her foot.  This is exacerbated by standing, and activity.  She had flexion-extension x-rays as well as an MRI of the lumbar spine with sedation. She is here to review those results.    PAST MEDICAL HISTORY:  Past Medical History:   Diagnosis Date    Esophageal reflux     High cholesterol     Lumbar spondylosis     Migraines     PONV (postoperative nausea  and vomiting)     Screen for colon cancer 2022    repeat CLN in 10 years     PAST SURGICAL HISTORY:  Past Surgical History:   Procedure Laterality Date    COLONOSCOPY SCREENING - REFERRAL N/A 7/5/2022    Procedure: COLONOSCOPY-SCREENING;  Surgeon: CB Gann MD;  Location: [facility] ENDOSCOPY    HYSTERECTOMY  2017     FAMILY HISTORY:  family history includes No Known Problems in her father and mother.    SOCIAL HISTORY:   reports that she has never smoked. She has never used smokeless tobacco. She reports that she does not drink alcohol and does not use drugs.    ALLERGIES:  No Known Allergies  MEDICATIONS:  Current Outpatient Medications on File Prior to Visit   Medication Sig Dispense Refill    ibuprofen 200 MG Oral Tab Take 2 tablets (400 mg total) by mouth every 6 (six) hours as needed for Pain (For Migraine).      naproxen 500 MG Oral Tab Take 1 tablet (500 mg total) by mouth 2 (two) times daily with meals. For leg pain      diclofenac 75 MG Oral Tab EC Take 1 tablet (75 mg total) by mouth 2 (two) times daily. For pain leg      GARLIC OR Take 1 tablet by mouth every morning.      omeprazole 20 MG Oral Capsule Delayed Release Take 1 capsule (20 mg total) by mouth daily. (Patient taking differently: Take 1 capsule (20 mg total) by mouth every morning.) 90 capsule 1    rosuvastatin (CRESTOR) 40 MG Oral Tab Take 1 tablet (40 mg total) by mouth nightly. 90 tablet 1    B Complex-Biotin-FA (COMPLEX B-100) Oral Tab CR Take 1 tablet by mouth daily. 120 tablet 0    Vitamin D3, Cholecalciferol, 25 MCG (1000 UT) Oral Tab Take 1 tablet (1,000 Units total) by mouth daily.       No current facility-administered medications on file prior to visit.     REVIEW OF SYSTEMS:  All other systems were reviewed and were negative except for those previously mentioned in the HPI    PHYSICAL EXAMINATION:  General: No acute distress.  Respiratory: Non-labored respirations bilaterally. No audible wheezing  Cardiovascular: Extremities warm  and well-perfused.  Abdomen: Soft, nontender, nondistended.   Musculoskeletal: Moves all extremities well, symmetrically.  Extremities: No edema.     NEUROLOGIC EXAMINATION:  Mental status: Alert and oriented x 3  Speech: Clear, fluent  Cranial nerves: PERRLA, EOMI, face symmetric, with normal strength and sensation, tongue and palate midline, SCM 5/5 bilaterally  Motor:                           RIGHT  Delt 5/5   Bic 5/5  Tri 5/5   HI 5/5    5/5  IP 5/5   Quad 5/5   Ham 5/5   AT 5/5   EHL 5/5 Sterling 5/5                          LEFT    Delt 5/5   Bic 5/5  Tri 5/5   HI 5/5    5/5  IP 5/5   Quad 5/5   Ham 5/5   AT 5/5   EHL 5/5 Sterling 5/5            No pronator drift  Tone: Normal  Atrophy/Fasciculations: None  Sensation: Normal to light touch, symmetric, no neglect  Cerebellar: Normal finger nose finger  Gait: Normal, nondistressed heel toe tandem gait      Reflexes: 2+ throughout, symmetric, no Lily's     IMAGING:  XR lumbar flexion-extension 1/8/2024: No acute fracture, malalignment, or subluxations on dynamic views.  MR lumbar 1/29/2024: Diffuse degenerative spondylosis, most prominent at L4-5, L5-S1.  At L5-S1 there is moderate to severe left lateral recess stenosis affecting the traversing S1 nerve root.  There is also moderate to severe left foraminal stenosis, but no pipe compression of the L5 nerve root.    ASSESSMENT:  Ms. Miles continues to have persistent signs of a left-sided S1 radiculopathy.  This has become refractory to physical therapy.  She has also had an L5-S1 interlaminar epidural steroid injection that helped her about a year ago and provided her with significant relief. Unfortunately, the pain has returned despite aggressive medical management with diet and physical therapy. She also had a repeat epidural steroid injection recently which provided her no relief.  We reviewed her new imaging.  Her original MRI was degraded by motion.  Her new MRI demonstrates moderate to severe left  lateral recess stenosis with compression of the traversing S1 nerve root.  Flexion extension lumbar x-rays do not demonstrate any instabilities.  Given her circumstances, I believe surgical decompression could prove beneficial to her and therefore offered it to her.  She wishes to proceed with left minimally invasive L5-S1 laminoforaminotomy around April 2024.     Plan:  -Left MIS L5-S1 laminoforaminotomy April 2024  -Medical clearance prior    Braulio Sampson MD  Neurological Surgery    [address], [address]  [place] [address]  [phone]  Pager [phone]  2/1/2024 8:21 AM      This note was created using a voice-recognition transcribing system. Incorrect words or phrases may have been missed during proofreading. Please interpret accordingly.    Total Time    Established Patient Total Time       30  minutes.      Activities       Preparing to see the patient (chart/tests/imaging review).       Obtaining and/or reviewing separately obtained history.       Performing a medically appropriate examination and/or evaluation.       Counseling and educating the patient/family/caregiver.       Ordering medications, tests, or procedures.       Referring and communicating with other health care professionals (when not separately reported).       Documenting clincal information in the electronic or other health record.       Independently interpreting results (not separately reported).    Communicating results to the patient/family/caregiver.    Care coordination (not separately reported).

## 2024-02-01 NOTE — PATIENT INSTRUCTIONS
Usted está programado para la siguiente cirugía: Left lumbar 5-sacral 1 decompression 4-2-24 con el Doctor Adrián a [facility].     Necessitas autorizacion de barnes doctor primario.  Por favor, llamar a la oficina de Dr. Corona para kip adilson.    Deberá comunicarse con el departamento de preadmisión al [phone].  Hablará con kip enfermera que revisará barnes historial médico y le brindará información del hospital.   La enfermera también le programará todas yessi pruebas preoperatorias necesarias para barnes cirugía. [place] incluirá análisis de luis y MRSA.   Solo puede ash Tylenol, Tylenol Extra Strength, Arthritis Tylenol o Norco o Tramadol recetados para el dolor si se necesita algo.  No debe comer ni beber nada después de las 12:00 la noche anterior a la cirugía, a menos que la enfermera de preadmisiones le indique lo contrario.  El hospital se comunicará con usted 1-2 días antes de la cirugía con barnes hora oficial de llegada.      Visite el siguiente sitio web para conocer la política de restricción y detección de visitantes detallada y actualizada en [facility] [email or web address]     Compre un jabon especial llamado Hibiclens botella de 8 oz.  A menudo se encuentra con l os suministros de primeros auxillios.  Banese diariamente con bernardo jabon mariza le kapoor antes de la cirugia.  Use la botella entera mariza le kapoor.  No se afeite cerca del area de la cirugia mariza al menos 48 horas antes de la cirugia.    Instrucciones:  Lave barnes amarilys rustam de costumbre con barnes champu normal y lave barnes mor con barnes limpiador regular.  Enjuague aleksander barnes cuerpo para eliminar cualquier ángel de champu que se encuentre sobre barnes piel.  Cierre el grifo o alejese del chorro de agua.   Vierta Hibiclens sobre un pano limpio y humedo y lavese suavemente desde el dereje hacia abajo, evitando el area genital.  Enjuaguese aleksander el cuerpo.  Climax es muy importante.  Seque barnes cuerpo kip toalla limpia y  francesca  No use lociones, talcos o cremas en barnes cuerpo despues de esta ducha.        You are scheduled for Left lumbar 5-sacral 1 decompression on 4-2-24 with Dr. Sampson at [facility].    PCP clearance is needed within 30 days of surgery.  We have faxed a request for pre-op clearance to your primary care physician Dr Corona. Please contact their office for appointment.  Please schedule this appointment AT LEAST 1 WEEK PRIOR TO SURGERY DATE.  Your PCP may order additional testing or clearance from another specialist.   You will have an appointment with our RN Spine Navigator prior to surgery.  This is an educational telehealth/phone visit in which you will receive more information on the specifics of your surgery, instructions for before surgery, what to expect in the hospital and how to take care of yourself after surgery.  Your surgeon wants you to to participate in this visit so that you are as prepared for surgery as possible and have an opportunity to ask questions.  If possible, we would like your care partner to be present for the visit as well.     You will need to contact the Pre-admission department at [phone]. The Pre-Admission nurse will review your health history and give you information for day-of instructions. The nurse will also get you scheduled for all your pre-op testing required for your surgery.   You will need pre-operative labs which will include blood work and a MRSA/MSSA test (nasal swab). You will need for fast for the blood work. You may also need an EKG and/or chest x-ray depending on your current health status.    Do not take any blood thinning medications such as over the counter NSAIDS (advil, aleve, ibuprofen etc.), herbal supplements (garlic,tumeric etc.), vitamin E, fish oil or krill oil for at least 7 days prior to surgery.  If you have questions about your other medications, please call our office or send a [facility] message.   You should have nothing to eat or drink  after 11:00pm the night prior to surgery except for the following:  Do drink 12 ounces of regular Gatorade (NOT RED) 12 hours and 4 hours prior to your scheduled surgery time. Do not drink any other liquids (including water) before your surgery. Take 1000 mg of Tylenol (Acetaminophen) 4 hours before your scheduled surgery time, take this with your scheduled Gatorade.  In order to prevent infection, you will need to purchase Hibiclens soap and use it after your regular body soap for 5 days prior to your procedure.  The last shower should be the night before surgery.  This soap can be found at any pharmacy in the first aid section. See detailed instructions below.    Our office will get prior authorization for surgery through your insurance.   Surgery is usually scheduled as 1-2 day admission.  This is an estimate and varies from person to person.  Ultimately, the surgeon will determine when you are ready to be discharged.  The hospital will contact you 1-2 days before surgery with your arrival time.       If you require FMLA or disability paperwork for your recovery, please make sure to either drop it off or have it faxed to our office at [phone]. Be sure the form has your name and date of birth on it.  The form will be faxed to our Forms Department and they will complete it for you.  There is a 25$ fee for all forms that need to be filled out.  Please be aware there is a 10-14 day turnaround time.  You will need to sign a release of information (JANINE) form if your paperwork does not come with one.  You may call the Forms Department with any questions at [phone].  Their fax number is [phone].     POST OP CARE--First Two Weeks  No bending at waist, twisting, pushing or pulling  No lifting more than 10 lb (a gallon of milk)  Wear cervical collar/lumbar brace, if prescribed, as instructed by surgeon  No overhead reaching  No driving until approved by your surgeon   Change positions frequently. No prolonged  sitting or standing.  Increase walking as tolerated to avoid blood clots  No NSAIDs until approved by surgeon (ibuprofen, aleve, advil, meloxicam, Celebrex, diclofenac etc).   Remove any bandage on post op day 3.  Leave incision open to air.  Glue will fall off on its own.  If you have staples or sutures that are not dissolvable, these will be removed at your 2 or 3 week post op visit.   Check your incision for signs of infection daily (redness, warmth, drainage, increased pain at incision site, fever)  Do not shower until post op day 3.  Do not allow water pressure to directly hit the incision.  Do not scrub the incision and avoid any lotion, creams or perfume near the incision site.  No swimming, hot tubs or baths until your incision is completely healed.  Take pain medication as prescribed, if needed.  Constipation is a common side effect of opioids.  If you experience constipation, drink plenty of water and take over-the-counter stool softeners daily.   Avoid nicotine and alcohol   When to call the office:  Increased or change in location of pain  Increased weakness in arms or legs  Incision drainage, redness or warmth  Fever  Bowel or bladder changes   Choking on food or liquids (cervical)  Pain, redness, swelling, color changes or warmth in lower leg    Hibiclens Bathing  Hibiclens is a body soap that is used before surgery to protect you from getting an infection post-operatively  Hibiclens comes in a large blue bottle and can be found in most pharmacies in the First Aid supplies  Shower with this daily for FIVE consecutive days before surgery, using the entire bottle over the five days.  The last shower should be the night before surgery.   Steps to bathing with Hibiclens  Do not use Hibiclens on your hair, face or private areas  Wash your hair and body as normal with your usual cleansers  Rinse well  Using a clean wet washcloth apply enough Hibiclens to cover your body. Wash from the neck down avoiding the  genital areas and concentrating on the surgical area  Rinse well  Dry yourself with a clean, dry towel  Do not use any powders, creams, lotions or sprays on your body as these attract bacteria  Deodorant, hand lotion and facial creams are acceptable.       If you have any questions or concerns, please contact our office at [phone] #2 or via [employer] message.          Refill policies:    Allow 2-3 business days for refills; controlled substances may take longer.  Contact your pharmacy at least 5 days prior to running out of medication and have them send an electronic request or submit request through the “request refill” option in your [employer] account.  Refills are not addressed on weekends; covering physicians do not authorize routine medications on weekends.  No narcotics or controlled substances are refilled after noon on Fridays or by on call physicians.  By law, narcotics must be electronically prescribed.  A 30 day supply with no refills is the maximum allowed.  If your prescription is due for a refill, you may be due for a follow up appointment.  To best provide you care, patients receiving routine medications need to be seen at least once a year.  Patients receiving narcotic/controlled substance medications need to be seen at least once every 3 months.  In the event that your preferred pharmacy does not have the requested medication in stock (e.g. Backordered), it is your responsibility to find another pharmacy that has the requested medication available.  We will gladly send a new prescription to that pharmacy at your request.    Scheduling Tests:    If your physician has ordered radiology tests such as MRI or CT scans, please contact Central Scheduling at [phone] right away to schedule the test.  Once scheduled, the [facility] Centralized Referral Team will work with your insurance carrier to obtain pre-certification or prior authorization.  Depending on your insurance carrier, approval may take 3-10  days.  It is highly recommended patients assure they have received an authorization before having a test performed.  If test is done without insurance authorization, patient may be responsible for the entire amount billed.      Precertification and Prior Authorizations:  If your physician has recommended that you have a procedure or additional testing performed the [facility] Centralized Referral Team will contact your insurance carrier to obtain pre-certification or prior authorization.    You are strongly encouraged to contact your insurance carrier to verify that your procedure/test has been approved and is a COVERED benefit.  Although the [facility] Centralized Referral Team does its due diligence, the insurance carrier gives the disclaimer that \"Although the procedure is authorized, this does not guarantee payment.\"    Ultimately the patient is responsible for payment.   Thank you for your understanding in this matter.  Paperwork Completion:  If you require FMLA or disability paperwork for your recovery, please make sure to either drop it off or have it faxed to our office at [phone]. Be sure the form has your name and date of birth on it.  The form will be faxed to our Forms Department and they will complete it for you.  There is a 25$ fee for all forms that need to be filled out.  Please be aware there is a 10-14 day turnaround time.  You will need to sign a release of information (JANINE) form if your paperwork does not come with one.  You may call the Forms Department with any questions at [phone].  Their fax number is [phone].

## 2024-02-01 NOTE — TELEPHONE ENCOUNTER
Patient is scheduled for LEFT MINIMALLY INVASIVE LUMBAR 5 TO SACRAL 1 LAMINOFORAMINOTOMY, PARTIAL MEDIAL FACETECTOMY  on 4-2-24 with Dr Sampson at [facility].    Y pre-op apt scheduled (if sx is more than 30 days from last apt)  Ypre-op apt scheduled with RN spine navigator  Y Surgical instructions reviewed by nursing staff with patient  Y  form completed  Y Surgery order signed   Y Placed sx on surgery sheet  Y Placed on outlook calendar  Y [employer] message sent to patient with sx instructions  Y Faxed pre-op clearance request to PCP Dr Corona   N/a Faxed letter to prescribing provider requesting anticoagulants be held for surgery  N/a E-mail sent to [facility]  Y Post-op appointments made  Y TOMASZ NEWELL. Routed to PA team to initiate.  Y Post-Op outreach pt reminder placed.   Y Entire Neurosurgery Checklist Completed

## 2024-02-01 NOTE — TELEPHONE ENCOUNTER
Usted está programado para la siguiente cirugía: Left lumbar 5-sacral 1 decompression 4-2-24 con el Doctor Adrián a [facility].      Necessitas autorizacion de barnes doctor primario.  Por favor, llamar a la oficina de Dr. Corona para kip adilson.    Deberá comunicarse con el departamento de preadmisión al [phone].  Hablará con kip enfermera que revisará barnes historial médico y le brindará información del hospital.   La enfermera también le programará todas yessi pruebas preoperatorias necesarias para barnes cirugía. Vesta incluirá análisis de luis y MRSA.   Solo puede ash Tylenol, Tylenol Extra Strength, Arthritis Tylenol o Norco o Tramadol recetados para el dolor si se necesita algo.  No debe comer ni beber nada después de las 12:00 la noche anterior a la cirugía, a menos que la enfermera de preadmisiones le indique lo contrario.  El hospital se comunicará con usted 1-2 días antes de la cirugía con barnes hora oficial de llegada.        Visite el siguiente sitio web para conocer la política de restricción y detección de visitantes detallada y actualizada en [facility] [email or web address]      Compre un jabon especial llamado Hibiclens botella de 8 oz.  A menudo se encuentra con l os suministros de primeros auxillios.  Banese diariamente con bernardo jabon mariza le kapoor antes de la cirugia.  Use la botella entera mariza le kapoor.  No se afeite cerca del area de la cirugia mariza al menos 48 horas antes de la cirugia.    Instrucciones:  Lave barnes amarilys rustam de costumbre con barnes champu normal y lave barnes mor con barnes limpiador regular.  Enjuague aleksander barnes cuerpo para eliminar cualquier ángel de champu que se encuentre sobre barnes piel.  Cierre el grifo o alejese del chorro de agua.   Vierta Hibiclens sobre un pano limpio y humedo y lavese suavemente desde el dereje hacia abajo, evitando el area genital.  Enjuaguese aleksander el cuerpo.  Vesta es muy importante.  Seque barnes cuerpo kip toalla  limpia y fresca  No use lociones, talcos o cremas en barnes cuerpo despues de esta ducha.           You are scheduled for Left lumbar 5-sacral 1 decompression on 4-2-24 with Dr. Sampson at [facility].     PCP clearance is needed within 30 days of surgery.  We have faxed a request for pre-op clearance to your primary care physician Dr Corona. Please contact their office for appointment.  Please schedule this appointment AT LEAST 1 WEEK PRIOR TO SURGERY DATE.  Your PCP may order additional testing or clearance from another specialist.   You will have an appointment with our RN Spine Navigator prior to surgery.  This is an educational telehealth/phone visit in which you will receive more information on the specifics of your surgery, instructions for before surgery, what to expect in the hospital and how to take care of yourself after surgery.  Your surgeon wants you to to participate in this visit so that you are as prepared for surgery as possible and have an opportunity to ask questions.  If possible, we would like your care partner to be present for the visit as well.     You will need to contact the Pre-admission department at [phone]. The Pre-Admission nurse will review your health history and give you information for day-of instructions. The nurse will also get you scheduled for all your pre-op testing required for your surgery.   You will need pre-operative labs which will include blood work and a MRSA/MSSA test (nasal swab). You will need for fast for the blood work. You may also need an EKG and/or chest x-ray depending on your current health status.    Do not take any blood thinning medications such as over the counter NSAIDS (advil, aleve, ibuprofen etc.), herbal supplements (garlic,tumeric etc.), vitamin E, fish oil or krill oil for at least 7 days prior to surgery.  If you have questions about your other medications, please call our office or send a [employer] message.   You should have nothing to  eat or drink after 11:00pm the night prior to surgery except for the following:  Do drink 12 ounces of regular Gatorade (NOT RED) 12 hours and 4 hours prior to your scheduled surgery time. Do not drink any other liquids (including water) before your surgery. Take 1000 mg of Tylenol (Acetaminophen) 4 hours before your scheduled surgery time, take this with your scheduled Gatorade.  In order to prevent infection, you will need to purchase Hibiclens soap and use it after your regular body soap for 5 days prior to your procedure.  The last shower should be the night before surgery.  This soap can be found at any pharmacy in the first aid section. See detailed instructions below.    Our office will get prior authorization for surgery through your insurance.   Surgery is usually scheduled as 1-2 day admission.  This is an estimate and varies from person to person.  Ultimately, the surgeon will determine when you are ready to be discharged.  The hospital will contact you 1-2 days before surgery with your arrival time.         If you require FMLA or disability paperwork for your recovery, please make sure to either drop it off or have it faxed to our office at [phone]. Be sure the form has your name and date of birth on it.  The form will be faxed to our Forms Department and they will complete it for you.  There is a 25$ fee for all forms that need to be filled out.  Please be aware there is a 10-14 day turnaround time.  You will need to sign a release of information (JANINE) form if your paperwork does not come with one.  You may call the Forms Department with any questions at [phone].  Their fax number is [phone].      POST OP CARE--First Two Weeks  No bending at waist, twisting, pushing or pulling  No lifting more than 10 lb (a gallon of milk)  Wear cervical collar/lumbar brace, if prescribed, as instructed by surgeon  No overhead reaching  No driving until approved by your surgeon   Change positions  frequently. No prolonged sitting or standing.  Increase walking as tolerated to avoid blood clots  No NSAIDs until approved by surgeon (ibuprofen, aleve, advil, meloxicam, Celebrex, diclofenac etc).   Remove any bandage on post op day 3.  Leave incision open to air.  Glue will fall off on its own.  If you have staples or sutures that are not dissolvable, these will be removed at your 2 or 3 week post op visit.   Check your incision for signs of infection daily (redness, warmth, drainage, increased pain at incision site, fever)  Do not shower until post op day 3.  Do not allow water pressure to directly hit the incision.  Do not scrub the incision and avoid any lotion, creams or perfume near the incision site.  No swimming, hot tubs or baths until your incision is completely healed.  Take pain medication as prescribed, if needed.  Constipation is a common side effect of opioids.  If you experience constipation, drink plenty of water and take over-the-counter stool softeners daily.   Avoid nicotine and alcohol   When to call the office:  Increased or change in location of pain  Increased weakness in arms or legs  Incision drainage, redness or warmth  Fever  Bowel or bladder changes   Choking on food or liquids (cervical)  Pain, redness, swelling, color changes or warmth in lower leg     Hibiclens Bathing  Hibiclens is a body soap that is used before surgery to protect you from getting an infection post-operatively  Hibiclens comes in a large blue bottle and can be found in most pharmacies in the First Aid supplies  Shower with this daily for FIVE consecutive days before surgery, using the entire bottle over the five days.  The last shower should be the night before surgery.   Steps to bathing with Hibiclens  Do not use Hibiclens on your hair, face or private areas  Wash your hair and body as normal with your usual cleansers  Rinse well  Using a clean wet washcloth apply enough Hibiclens to cover your body. Wash from  the neck down avoiding the genital areas and concentrating on the surgical area  Rinse well  Dry yourself with a clean, dry towel  Do not use any powders, creams, lotions or sprays on your body as these attract bacteria  Deodorant, hand lotion and facial creams are acceptable.      Clearances: PCP  PA: OSIRIS  CPT: [id number]

## 2024-03-18 ENCOUNTER — OFFICE VISIT (OUTPATIENT)
Dept: INTERNAL MEDICINE CLINIC | Facility: CLINIC | Age: 54
End: 2024-03-18
Payer: COMMERCIAL

## 2024-03-18 VITALS
BODY MASS INDEX: 33.86 KG/M2 | SYSTOLIC BLOOD PRESSURE: 110 MMHG | OXYGEN SATURATION: 98 % | TEMPERATURE: 98 F | HEIGHT: 61.85 IN | WEIGHT: 184 LBS | DIASTOLIC BLOOD PRESSURE: 78 MMHG | HEART RATE: 66 BPM

## 2024-03-18 DIAGNOSIS — Z01.818 PREOP EXAMINATION: Primary | ICD-10-CM

## 2024-03-18 PROCEDURE — 3074F SYST BP LT 130 MM HG: CPT | Performed by: INTERNAL MEDICINE

## 2024-03-18 PROCEDURE — 99245 OFF/OP CONSLTJ NEW/EST HI 55: CPT | Performed by: INTERNAL MEDICINE

## 2024-03-18 PROCEDURE — 3008F BODY MASS INDEX DOCD: CPT | Performed by: INTERNAL MEDICINE

## 2024-03-18 PROCEDURE — 3078F DIAST BP <80 MM HG: CPT | Performed by: INTERNAL MEDICINE

## 2024-03-20 NOTE — PROGRESS NOTES
[facility]  Return Patient Progress Note      HPI:     Chief Complaint   Patient presents with    Pre-Op Exam       Beryl Miles is a 53 year old female presenting for:Follow up.      Has a significant  has a past medical history of Esophageal reflux, High cholesterol, Lumbar spondylosis, Migraines, PONV (postoperative nausea and vomiting), and Screen for colon cancer (2022).      Here today for for a preprocedure clearance.  She is scheduled to undergo left minimally invasive lumbar 5 to sacral 1 laminoforaminotomy, partial medial facetectomy with Dr. Sampson on 4/2/2024.      Reports no history of CVA/TIA, CAD/CHF, Renal Insufficiency, DVT/PE, Diabetes on insulin.  No adverse reaction to anesthesia in the past.      No recent chest pain, SOB/Dyspnea or decreasing exercise tolerance.      History of problems with anesthesia: None  History of abnormal/excessive bleeding with surgery: None  EDUARDA present: No  Chest pain or shortness of breath at rest: No  Chest pain or shortness of breath with exertion: No  Can walk > 2 blocks w/o rest or climb > 1 flight of stairs: Yes  Recent cardiac evaluation: N/A  History of illicit drug use: None          Labs:   CMP:  Lab Results   Component Value Date/Time     01/17/2024 04:51 PM    K 3.7 01/17/2024 04:51 PM     01/17/2024 04:51 PM    CO2 28.0 01/17/2024 04:51 PM    CREATSERUM 0.69 01/17/2024 04:51 PM    CA 9.1 01/17/2024 04:51 PM    GLU 82 01/17/2024 04:51 PM    TP 7.0 01/17/2024 04:51 PM    ALB 4.4 01/17/2024 04:51 PM    ALKPHO 76 01/17/2024 04:51 PM    AST 16 01/17/2024 04:51 PM    ALT 13 01/17/2024 04:51 PM    BILT 0.5 01/17/2024 04:51 PM    TSH 0.901 05/25/2023 11:26 AM    T4F 1.0 07/22/2021 08:42 AM        Hemoglobin A1C, Microalbumin  Lab Results   Component Value Date/Time    A1C 5.5 01/17/2024 04:51 PM        Lipid panel  Lab Results   Component Value Date/Time    CHOLEST 187 05/25/2023 11:26 AM    HDL 44 05/25/2023 11:26 AM     TRIG 301 (H) 05/25/2023 11:26 AM    LDL 93 05/25/2023 11:26 AM    NONHDLC 143 (H) 05/25/2023 11:26 AM        Medications:  Current Outpatient Medications   Medication Sig Dispense Refill    ibuprofen 200 MG Oral Tab Take 2 tablets (400 mg total) by mouth every 6 (six) hours as needed for Pain (For Migraine).      naproxen 500 MG Oral Tab Take 1 tablet (500 mg total) by mouth 2 (two) times daily with meals. For leg pain      diclofenac 75 MG Oral Tab EC Take 1 tablet (75 mg total) by mouth 2 (two) times daily. For pain leg      GARLIC OR Take 1 tablet by mouth every morning.      omeprazole 20 MG Oral Capsule Delayed Release Take 1 capsule (20 mg total) by mouth daily. (Patient taking differently: Take 1 capsule (20 mg total) by mouth every morning.) 90 capsule 1    rosuvastatin (CRESTOR) 40 MG Oral Tab Take 1 tablet (40 mg total) by mouth nightly. 90 tablet 1    B Complex-Biotin-FA (COMPLEX B-100) Oral Tab CR Take 1 tablet by mouth daily. 120 tablet 0    Vitamin D3, Cholecalciferol, 25 MCG (1000 UT) Oral Tab Take 1 tablet (1,000 Units total) by mouth daily.        PMH:  Past Medical History:   Diagnosis Date    Esophageal reflux     High cholesterol     Lumbar spondylosis     Migraines     PONV (postoperative nausea and vomiting)     Screen for colon cancer 2022    repeat CLN in 10 years         PSH:  Past Surgical History:   Procedure Laterality Date    COLONOSCOPY SCREENING - REFERRAL N/A 7/5/2022    Procedure: COLONOSCOPY-SCREENING;  Surgeon: CB Gann MD;  Location: [facility] ENDOSCOPY    HYSTERECTOMY  2017       Allergies:  No Known Allergies   Social History:  Social History     Socioeconomic History    Marital status:    Tobacco Use    Smoking status: Never    Smokeless tobacco: Never   Vaping Use    Vaping Use: Never used   Substance and Sexual Activity    Alcohol use: Never    Drug use: Never   Other Topics Concern    Caffeine Concern Yes     Comment: coffee daily    Exercise Yes     Comment:  cardio   Social History Narrative    The patient does not use an assistive device..      The patient does live in a home with stairs.          Family History:  Family History   Problem Relation Age of Onset    No Known Problems Father     No Known Problems Mother     Breast Cancer Neg               REVIEW OF SYSTEMS:   Review of Systems   Constitutional:  Negative for chills, fatigue, fever and unexpected weight change.   HENT:  Negative for congestion, ear pain, hearing loss, rhinorrhea, sinus pain and sore throat.    Eyes:  Negative for pain, redness and visual disturbance.   Respiratory:  Negative for apnea, cough, chest tightness, shortness of breath and wheezing.    Cardiovascular:  Negative for chest pain, palpitations and leg swelling.   Gastrointestinal:  Negative for abdominal distention, abdominal pain, blood in stool, constipation and nausea.   Endocrine: Negative for cold intolerance, heat intolerance and polyuria.   Genitourinary:  Negative for dysuria, hematuria and urgency.   Musculoskeletal:  Positive for back pain. Negative for arthralgias, gait problem, joint swelling, myalgias and neck pain.   Skin:  Negative for rash and wound.   Allergic/Immunologic: Negative for food allergies and immunocompromised state.   Neurological:  Negative for dizziness, seizures, facial asymmetry, speech difficulty, weakness, light-headedness, numbness and headaches.   Hematological:  Negative for adenopathy. Does not bruise/bleed easily.   Psychiatric/Behavioral:  Negative for behavioral problems, sleep disturbance and suicidal ideas. The patient is not nervous/anxious.             PHYSICAL EXAM:   /78   Pulse 66   Temp 98.2 °F (36.8 °C)   Ht 5' 1.85\" (1.571 m)   Wt 184 lb (83.5 kg)   SpO2 98%   BMI 33.82 kg/m²  Estimated body mass index is 33.82 kg/m² as calculated from the following:    Height as of this encounter: 5' 1.85\" (1.571 m).    Weight as of this encounter: 184 lb (83.5 kg).     Wt Readings from  Last 3 Encounters:   03/18/24 184 lb (83.5 kg)   02/01/24 181 lb (82.1 kg)   01/29/24 181 lb (82.1 kg)       Physical Exam  Vitals reviewed.   Constitutional:       General: She is not in acute distress.     Appearance: She is well-developed.   HENT:      Head: Normocephalic and atraumatic.   Eyes:      Conjunctiva/sclera: Conjunctivae normal.      Pupils: Pupils are equal, round, and reactive to light.   Neck:      Thyroid: No thyromegaly.   Cardiovascular:      Rate and Rhythm: Normal rate and regular rhythm.      Heart sounds: Normal heart sounds, S1 normal and S2 normal. No murmur heard.     No friction rub. No gallop.   Pulmonary:      Effort: Pulmonary effort is normal. No respiratory distress.      Breath sounds: Normal breath sounds. No wheezing or rales.   Chest:      Chest wall: No tenderness.   Abdominal:      General: Bowel sounds are normal. There is no distension.      Palpations: Abdomen is soft. There is no mass.      Tenderness: There is no abdominal tenderness. There is no guarding or rebound.   Musculoskeletal:         General: No tenderness. Normal range of motion.      Cervical back: Normal range of motion.   Lymphadenopathy:      Cervical: No cervical adenopathy.   Skin:     General: Skin is warm.      Findings: No erythema or rash.   Neurological:      Mental Status: She is alert and oriented to person, place, and time.      Cranial Nerves: No cranial nerve deficit.      Deep Tendon Reflexes: Reflexes are normal and symmetric.   Psychiatric:         Behavior: Behavior normal.         Thought Content: Thought content normal.         Judgment: Judgment normal.               ASSESSMENT AND PLAN:   Patient is a 53 year old female who presents primarily presents for:    (Z01.818) Preop examination  (primary encounter diagnosis)  Lab reviewed including CMP and Hemoglobin A1C.  No abnormality.       EKG reviewed from Jan 2024.  NSR.  No abnormality compared to previous.      She meets no risk factor  criteria according to revised cardiac risk index.  This correlates to a 3.9% chance of a major cardiac event occurring perioperatively.    Patient is medically optomized in regard to comorbid medical conditions as listed above, without any active cardiopulmonary symptoms, even with greater than 4 METS of activity. Based on age, physical examination, status of current comorbid conditions, and the nature of the specific procedure to be performed, the patient is at an acceptable  risk for the planned surgical procedure.  Patient understands the risks and complications of the planned procedure as explained by operating surgeon and agrees to proceed with the operation.         Meds & Refills for this Visit:  Requested Prescriptions      No prescriptions requested or ordered in this encounter       Orders Placed This Encounter   Procedures    CBC With Differential With Platelet    Comp Metabolic Panel (14) [E]    Prothrombin Time (PT) [E]    PTT, Activated [E]    Type and screen    MSSA and MRSA Culture Screen [E]       Imaging & Consults:  None          Return in about 3 months (around 6/18/2024) for Symptom monitoring.  Important follow up notes/labs for next visit      Patient indicates understanding of the above recommendations and agrees to the above plan.  Reasurrance and education provided. All questions answered.    Notified to call with any questions, complications, allergies, or worsening or changing symptoms as well as any side effects or complications from the treatments .  Red flags/ ER precautions discussed.    If diagnostic labs or imaging ordered advised patient to contact my office for results  24-48 hours after completion    This note was dictated using dragon speech recognition transcription software.  Typographical and transcription errors may be present.  Please call if any questions.             Sanchez Corona MD  [facility]        [phone]

## 2024-03-21 ENCOUNTER — OFFICE VISIT (OUTPATIENT)
Dept: SURGERY | Facility: CLINIC | Age: 54
End: 2024-03-21
Payer: COMMERCIAL

## 2024-03-21 ENCOUNTER — LAB ENCOUNTER (OUTPATIENT)
Dept: LAB | Facility: HOSPITAL | Age: 54
End: 2024-03-21
Attending: INTERNAL MEDICINE
Payer: COMMERCIAL

## 2024-03-21 VITALS — HEART RATE: 78 BPM | DIASTOLIC BLOOD PRESSURE: 74 MMHG | SYSTOLIC BLOOD PRESSURE: 112 MMHG

## 2024-03-21 DIAGNOSIS — M47.816 LUMBAR SPONDYLOSIS: ICD-10-CM

## 2024-03-21 DIAGNOSIS — M51.37 DDD (DEGENERATIVE DISC DISEASE), LUMBOSACRAL: Primary | ICD-10-CM

## 2024-03-21 DIAGNOSIS — M54.16 LUMBAR RADICULOPATHY: ICD-10-CM

## 2024-03-21 DIAGNOSIS — Z01.818 PREOP EXAMINATION: ICD-10-CM

## 2024-03-21 DIAGNOSIS — M48.061 LUMBAR FORAMINAL STENOSIS: ICD-10-CM

## 2024-03-21 DIAGNOSIS — M51.36 BULGE OF LUMBAR DISC WITHOUT MYELOPATHY: ICD-10-CM

## 2024-03-21 DIAGNOSIS — M54.59 MECHANICAL LOW BACK PAIN: ICD-10-CM

## 2024-03-21 DIAGNOSIS — M54.59 LUMBAR TRIGGER POINT SYNDROME: ICD-10-CM

## 2024-03-21 DIAGNOSIS — M47.816 FACET SYNDROME, LUMBAR: ICD-10-CM

## 2024-03-21 DIAGNOSIS — M51.16 LUMBAR DISC HERNIATION WITH RADICULOPATHY: ICD-10-CM

## 2024-03-21 PROBLEM — G43.109 MIGRAINE WITH AURA: Status: ACTIVE | Noted: 2024-03-21

## 2024-03-21 PROBLEM — M17.9 OSTEOARTHRITIS OF KNEE: Status: ACTIVE | Noted: 2024-03-21

## 2024-03-21 PROBLEM — E05.90 HYPERTHYROIDISM: Status: ACTIVE | Noted: 2024-03-21

## 2024-03-21 PROBLEM — N30.10 CHRONIC INTERSTITIAL CYSTITIS: Status: ACTIVE | Noted: 2024-03-21

## 2024-03-21 PROBLEM — K59.00 CONSTIPATION: Status: ACTIVE | Noted: 2024-03-21

## 2024-03-21 PROBLEM — N63.0 MASS OF BREAST: Status: ACTIVE | Noted: 2019-04-13

## 2024-03-21 PROBLEM — K22.2 LOWER ESOPHAGEAL RING: Status: ACTIVE | Noted: 2024-03-21

## 2024-03-21 PROBLEM — M54.2 NECK PAIN: Status: ACTIVE | Noted: 2024-03-21

## 2024-03-21 LAB
ALBUMIN SERPL-MCNC: 4.2 G/DL (ref 3.2–4.8)
ALBUMIN/GLOB SERPL: 1.6 {RATIO} (ref 1–2)
ALP LIVER SERPL-CCNC: 110 U/L
ALT SERPL-CCNC: 17 U/L
ANION GAP SERPL CALC-SCNC: 8 MMOL/L (ref 0–18)
ANTIBODY SCREEN: NEGATIVE
APTT PPP: 30.7 SECONDS (ref 23.3–35.6)
AST SERPL-CCNC: 19 U/L (ref ?–34)
BASOPHILS # BLD AUTO: 0.05 X10(3) UL (ref 0–0.2)
BASOPHILS NFR BLD AUTO: 0.5 %
BILIRUB SERPL-MCNC: 0.2 MG/DL (ref 0.3–1.2)
BUN BLD-MCNC: 21 MG/DL (ref 9–23)
BUN/CREAT SERPL: 30.4 (ref 10–20)
CALCIUM BLD-MCNC: 8.9 MG/DL (ref 8.7–10.4)
CHLORIDE SERPL-SCNC: 108 MMOL/L (ref 98–112)
CO2 SERPL-SCNC: 25 MMOL/L (ref 21–32)
CREAT BLD-MCNC: 0.69 MG/DL
DEPRECATED RDW RBC AUTO: 38.5 FL (ref 35.1–46.3)
EGFRCR SERPLBLD CKD-EPI 2021: 105 ML/MIN/1.73M2 (ref 60–?)
EOSINOPHIL # BLD AUTO: 0.14 X10(3) UL (ref 0–0.7)
EOSINOPHIL NFR BLD AUTO: 1.3 %
ERYTHROCYTE [DISTWIDTH] IN BLOOD BY AUTOMATED COUNT: 12.2 % (ref 11–15)
FASTING STATUS PATIENT QL REPORTED: NO
GLOBULIN PLAS-MCNC: 2.6 G/DL (ref 2.8–4.4)
GLUCOSE BLD-MCNC: 92 MG/DL (ref 70–99)
HCT VFR BLD AUTO: 37.2 %
HGB BLD-MCNC: 12.7 G/DL
IMM GRANULOCYTES # BLD AUTO: 0.05 X10(3) UL (ref 0–1)
IMM GRANULOCYTES NFR BLD: 0.5 %
INR BLD: 0.89 (ref 0.8–1.2)
LYMPHOCYTES # BLD AUTO: 2.11 X10(3) UL (ref 1–4)
LYMPHOCYTES NFR BLD AUTO: 20 %
MCH RBC QN AUTO: 29.4 PG (ref 26–34)
MCHC RBC AUTO-ENTMCNC: 34.1 G/DL (ref 31–37)
MCV RBC AUTO: 86.1 FL
MONOCYTES # BLD AUTO: 0.62 X10(3) UL (ref 0.1–1)
MONOCYTES NFR BLD AUTO: 5.9 %
NEUTROPHILS # BLD AUTO: 7.58 X10 (3) UL (ref 1.5–7.7)
NEUTROPHILS # BLD AUTO: 7.58 X10(3) UL (ref 1.5–7.7)
NEUTROPHILS NFR BLD AUTO: 71.8 %
OSMOLALITY SERPL CALC.SUM OF ELEC: 295 MOSM/KG (ref 275–295)
PLATELET # BLD AUTO: 438 10(3)UL (ref 150–450)
POTASSIUM SERPL-SCNC: 3.9 MMOL/L (ref 3.5–5.1)
PROT SERPL-MCNC: 6.8 G/DL (ref 5.7–8.2)
PROTHROMBIN TIME: 12.6 SECONDS (ref 11.6–14.8)
RBC # BLD AUTO: 4.32 X10(6)UL
RH BLOOD TYPE: POSITIVE
SODIUM SERPL-SCNC: 141 MMOL/L (ref 136–145)
WBC # BLD AUTO: 10.6 X10(3) UL (ref 4–11)

## 2024-03-21 PROCEDURE — 87081 CULTURE SCREEN ONLY: CPT | Performed by: INTERNAL MEDICINE

## 2024-03-21 PROCEDURE — 86850 RBC ANTIBODY SCREEN: CPT | Performed by: INTERNAL MEDICINE

## 2024-03-21 PROCEDURE — 86900 BLOOD TYPING SEROLOGIC ABO: CPT | Performed by: INTERNAL MEDICINE

## 2024-03-21 PROCEDURE — 3074F SYST BP LT 130 MM HG: CPT | Performed by: STUDENT IN AN ORGANIZED HEALTH CARE EDUCATION/TRAINING PROGRAM

## 2024-03-21 PROCEDURE — 85610 PROTHROMBIN TIME: CPT | Performed by: INTERNAL MEDICINE

## 2024-03-21 PROCEDURE — 86901 BLOOD TYPING SEROLOGIC RH(D): CPT | Performed by: INTERNAL MEDICINE

## 2024-03-21 PROCEDURE — 80053 COMPREHEN METABOLIC PANEL: CPT | Performed by: INTERNAL MEDICINE

## 2024-03-21 PROCEDURE — 85730 THROMBOPLASTIN TIME PARTIAL: CPT | Performed by: INTERNAL MEDICINE

## 2024-03-21 PROCEDURE — 85025 COMPLETE CBC W/AUTO DIFF WBC: CPT | Performed by: INTERNAL MEDICINE

## 2024-03-21 PROCEDURE — 3078F DIAST BP <80 MM HG: CPT | Performed by: STUDENT IN AN ORGANIZED HEALTH CARE EDUCATION/TRAINING PROGRAM

## 2024-03-21 PROCEDURE — 99214 OFFICE O/P EST MOD 30 MIN: CPT | Performed by: STUDENT IN AN ORGANIZED HEALTH CARE EDUCATION/TRAINING PROGRAM

## 2024-03-21 NOTE — PROGRESS NOTES
[facility]  Neurological Surgery Established Patient Clinic Note    Beryl Miles  9/17/1970  [id number]  PCP: Sanchez Corona MD  Referring Provider: Alex Behar, MD     REASON FOR VISIT:  Low back pain with radiation    HISTORY OF PRESENT ILLNESS 1/8/2024 :  Beryl Miles is a(n) 53 year old female with hyperlipidemia, GERD who is referred for evaluation of low back pain with radiation.  She reports atraumatic onset of low back pain radiating to the left leg approximately 3 years ago.  She has been dealing with this conservatively.  She describes her pain as intermittently radiating down the left lateral aspect of her thigh, calf, and lateral foot.  Any movement seems to exacerbate this.  Leaning forward certainly exacerbates this as well.  Approximately 1 year ago she had a left interlaminar epidural steroid injection by Dr. Behar which gave her great relief.  Most recently she had a right interlaminal epidural steroid injection that provided her with no relief.  She has had physical therapy in the past, with continuing pain.  She denies any balance difficulties, loss of dexterity, significant bilateral lower extremity weakness, or any red flags for myelopathy or cauda equina syndrome.      INTERVAL HISTORY 2/1/2024:  Beryl Miles returns to clinic today for continued spinal follow-up.  She was last seen on 1/8/2024.  She reports stable, persistent left-sided radiating pain.  It continues to affect her posterior thigh, calf, and plantar aspect of her foot.  She has associated numbness and tingling of her foot.  This is exacerbated by standing, and activity.  She had flexion-extension x-rays as well as an MRI of the lumbar spine with sedation. She is here to review those results.    INTERVAL HISTORY 3/21/2024:  Beryl Miles returns to clinic today for preoperative discussions.  She continues to endorse left-sided radicular pain.  No new  symptoms or red flags.  She is having her preoperative labs drawn today.    PAST MEDICAL HISTORY:  Past Medical History:   Diagnosis Date    Esophageal reflux     High cholesterol     Lumbar spondylosis     Migraines     PONV (postoperative nausea and vomiting)     Screen for colon cancer 2022    repeat CLN in 10 years     PAST SURGICAL HISTORY:  Past Surgical History:   Procedure Laterality Date    COLONOSCOPY SCREENING - REFERRAL N/A 7/5/2022    Procedure: COLONOSCOPY-SCREENING;  Surgeon: CB Gann MD;  Location: [facility] ENDOSCOPY    HYSTERECTOMY  2017     FAMILY HISTORY:  family history includes No Known Problems in her father and mother.    SOCIAL HISTORY:   reports that she has never smoked. She has never used smokeless tobacco. She reports that she does not drink alcohol and does not use drugs.    ALLERGIES:  No Known Allergies    MEDICATIONS:  Current Outpatient Medications on File Prior to Visit   Medication Sig Dispense Refill    ibuprofen 200 MG Oral Tab Take 2 tablets (400 mg total) by mouth every 6 (six) hours as needed for Pain (For Migraine).      naproxen 500 MG Oral Tab Take 1 tablet (500 mg total) by mouth 2 (two) times daily with meals. For leg pain      diclofenac 75 MG Oral Tab EC Take 1 tablet (75 mg total) by mouth 2 (two) times daily. For pain leg      GARLIC OR Take 1 tablet by mouth every morning.      omeprazole 20 MG Oral Capsule Delayed Release Take 1 capsule (20 mg total) by mouth daily. (Patient taking differently: Take 1 capsule (20 mg total) by mouth every morning.) 90 capsule 1    rosuvastatin (CRESTOR) 40 MG Oral Tab Take 1 tablet (40 mg total) by mouth nightly. 90 tablet 1    B Complex-Biotin-FA (COMPLEX B-100) Oral Tab CR Take 1 tablet by mouth daily. 120 tablet 0    Vitamin D3, Cholecalciferol, 25 MCG (1000 UT) Oral Tab Take 1 tablet (1,000 Units total) by mouth daily.       No current facility-administered medications on file prior to visit.     REVIEW OF SYSTEMS:  All other  systems were reviewed and were negative except for those previously mentioned in the HPI    PHYSICAL EXAMINATION:  General: No acute distress.  Respiratory: Non-labored respirations bilaterally. No audible wheezing  Cardiovascular: Extremities warm and well-perfused.  Abdomen: Soft, nontender, nondistended.   Musculoskeletal: Moves all extremities well, symmetrically.  Extremities: No edema.     NEUROLOGIC EXAMINATION:  Mental status: Alert and oriented x 3  Speech: Clear, fluent  Cranial nerves: PERRLA, EOMI, face symmetric, with normal strength and sensation, tongue and palate midline, SCM 5/5 bilaterally  Motor:                           RIGHT  Delt 5/5   Bic 5/5  Tri 5/5   HI 5/5    5/5  IP 5/5   Quad 5/5   Ham 5/5   AT 5/5   EHL 5/5 Sterling 5/5                          LEFT    Delt 5/5   Bic 5/5  Tri 5/5   HI 5/5    5/5  IP 5/5   Quad 5/5   Ham 5/5   AT 5/5   EHL 5/5 Sterling 5/5            No pronator drift  Tone: Normal  Atrophy/Fasciculations: None  Sensation: Normal to light touch, symmetric, no neglect  Cerebellar: Normal finger nose finger  Gait: Normal, nondistressed heel toe tandem gait      Reflexes: 2+ throughout, symmetric, no Lily's    IMAGING:  No new neurosurgical imaging for review    ASSESSMENT:  Ms. Miles continues to have persistent signs of a left-sided S1 radiculopathy. This has become refractory to physical therapy. She has also had an L5-S1 interlaminar epidural steroid injection that helped her about a year ago and provided her with significant relief. Unfortunately, the pain has returned despite aggressive medical management with diet and physical therapy. She also had a repeat epidural steroid injection recently which provided her no relief. Her new MRI demonstrates moderate to severe left lateral recess stenosis with compression of the traversing S1 nerve root. Flexion extension lumbar x-rays do not demonstrate any instabilities. Given her circumstances, I believe surgical  decompression could prove beneficial to her and therefore offered it to her. She wishes to proceed with left minimally invasive L5-S1 laminoforaminotomy.  This is scheduled for April 2, 2024.     Plan:  -Left MIS L5-S1 laminoforaminotomy 4/2/2024    Braulio Sampson MD  Neurological Surgery    [address], [address]  [place] [address]  [phone]  Pager [phone]  3/21/2024 3:16 PM      This note was created using a voice-recognition transcribing system. Incorrect words or phrases may have been missed during proofreading. Please interpret accordingly.    Total Time    Established Patient Total Time       30  minutes.      Activities       Preparing to see the patient (chart/tests/imaging review).       Obtaining and/or reviewing separately obtained history.       Performing a medically appropriate examination and/or evaluation.       Counseling and educating the patient/family/caregiver.       Ordering medications, tests, or procedures.       Referring and communicating with other health care professionals (when not separately reported).       Documenting clincal information in the electronic or other health record.       Independently interpreting results (not separately reported).    Communicating results to the patient/family/caregiver.    Care coordination (not separately reported).

## 2024-03-21 NOTE — PATIENT INSTRUCTIONS
Refill policies:    Allow 2-3 business days for refills; controlled substances may take longer.  Contact your pharmacy at least 5 days prior to running out of medication and have them send an electronic request or submit request through the “request refill” option in your [employer] account.  Refills are not addressed on weekends; covering physicians do not authorize routine medications on weekends.  No narcotics or controlled substances are refilled after noon on Fridays or by on call physicians.  By law, narcotics must be electronically prescribed.  A 30 day supply with no refills is the maximum allowed.  If your prescription is due for a refill, you may be due for a follow up appointment.  To best provide you care, patients receiving routine medications need to be seen at least once a year.  Patients receiving narcotic/controlled substance medications need to be seen at least once every 3 months.  In the event that your preferred pharmacy does not have the requested medication in stock (e.g. Backordered), it is your responsibility to find another pharmacy that has the requested medication available.  We will gladly send a new prescription to that pharmacy at your request.    Scheduling Tests:    If your physician has ordered radiology tests such as MRI or CT scans, please contact Central Scheduling at [phone] right away to schedule the test.  Once scheduled, the [facility] Centralized Referral Team will work with your insurance carrier to obtain pre-certification or prior authorization.  Depending on your insurance carrier, approval may take 3-10 days.  It is highly recommended patients assure they have received an authorization before having a test performed.  If test is done without insurance authorization, patient may be responsible for the entire amount billed.      Precertification and Prior Authorizations:  If your physician has recommended that you have a procedure or additional testing performed the [facility]  Centralized Referral Team will contact your insurance carrier to obtain pre-certification or prior authorization.    You are strongly encouraged to contact your insurance carrier to verify that your procedure/test has been approved and is a COVERED benefit.  Although the [facility] Centralized Referral Team does its due diligence, the insurance carrier gives the disclaimer that \"Although the procedure is authorized, this does not guarantee payment.\"    Ultimately the patient is responsible for payment.   Thank you for your understanding in this matter.  Paperwork Completion:  If you require FMLA or disability paperwork for your recovery, please make sure to either drop it off or have it faxed to our office at [phone]. Be sure the form has your name and date of birth on it.  The form will be faxed to our Forms Department and they will complete it for you.  There is a 25$ fee for all forms that need to be filled out.  Please be aware there is a 10-14 day turnaround time.  You will need to sign a release of information (JANINE) form if your paperwork does not come with one.  You may call the Forms Department with any questions at [phone].  Their fax number is [phone].

## 2024-03-22 ENCOUNTER — TELEPHONE (OUTPATIENT)
Dept: INTERNAL MEDICINE CLINIC | Facility: CLINIC | Age: 54
End: 2024-03-22

## 2024-03-22 NOTE — TELEPHONE ENCOUNTER
Lab analysis completed.  Reviewed CBC, CMP, PT PTT as well as MSSA and MRSA culture.  No significant abnormality.    Patient continues to be acceptable risk for surgery.    Dr. Corona

## 2024-03-25 ENCOUNTER — NURSE ONLY (OUTPATIENT)
Dept: SURGERY | Facility: CLINIC | Age: 54
End: 2024-03-25
Payer: COMMERCIAL

## 2024-03-25 DIAGNOSIS — Z71.9 ENCOUNTER FOR EDUCATION: Primary | ICD-10-CM

## 2024-03-25 NOTE — PROGRESS NOTES
RN Spine Navigator Education for [facility]     If you have received instructions from your surgeon that are different from those listed below, please follow your physician's instructions.    You are scheduled for a Lumbar decompression with Dr. Sampson on 4/2.      Patient Surgical Goals: wants to be able to stand up longer.     Before Your Surgery    Choose a Care Partner  Patient attended spine navigator visit independently.  Care partner is Dimitri  and her daughter in law. Your care partner(s) should be able to provide transportation to and from the hospital. They should be able to help at home for the first week after discharge, including helping you with meals, medication, and dressing changes.    Clearance Before Surgery  You will need to see your primary care provider within 30 days before surgery. Please make sure this appointment is at least a week before surgery as more testing or doctor visits may be ordered. Presurgical testing may include labs, nasal swab, imaging, EKG.   Make sure that you complete all preadmission testing so that surgery does not get delayed.    Home Environment  Assessed home status: home has stairs, bedroom and bathroom are on first floor, and patient has pets. Suggested arrangements for pet care.  It is recommended that you prepare your home by putting clean sheets on your bed, freezing meals, and putting frequently used items at waist level.   Prevent falls by removing items that could cause you to trip, adding nightlights and adding a nonskid mat in shower.   Assistive devices can be purchased at a medical supply store or online including canes, walkers, toileting devices (commodes, raised toilet seats, toilet paper wiping aid), long handled sponge, shower chair or tub transfer bench, grabber/reacher tool, sock aid, long handled shoehorn, if needed.      Tobacco, Nicotine and Marijuana Use   Not applicable    Medications to Stop   For 7-10 days before surgery do not take any  blood thinning medications. This includes non-steroidal anti-inflammatories or NSAIDs (Advil, Aleve, Motrin, ibuprofen, naproxen, meloxicam, diclofenac, celecoxib, etc.), aspirin (unless told otherwise by your cardiologist or surgeon), herbal supplements and vitamins (garlic, turmeric, vitamin E, fish oil or krill oil, etc.). You may only take Tylenol or prescribed narcotic medication if needed for pain.   Other medications to stop include: none    Leading Up to Day of Surgery  Five days before surgery wash with Hibiclens soap after your regular body soap every day. Do not put use Hibiclens on your face, hair or privates. Your last shower should be the night before surgery.  One business day before surgery, the preadmission testing staff will call you and let you know what time to arrive, where to park and will provide any additional instructions.   After 11pm the day before surgery, do not eat or drink anything (including water, gum, or candies) except for Tylenol and Gatorade.   Drink 12 ounces of regular Gatorade (NOT RED) 12 hours prior to your scheduled surgery time. Drink your second 12 ounces of regular Gatorade and take 1000 mg of Tylenol (acetaminophen) 4 hours before your scheduled surgery time.     Items to Bring to the Hospital   Bring insurance card, ID, advanced directive, or medical power of  paperwork, loose fitting clothing, shoes with a back that can accommodate swollen feet, long phone charging cord, glasses, hearing aids, dentures, and CPAP mask and tubing.  Do not bring jewelry, valuables, or medications.   If you take an uncommon medication that the hospital may not have, it must be brought to the hospital in the original container, and you must notify the nurse of this medication.     In the Hospital     Operative Day and Hospital Stay  In the preoperative area, you will change into a gown, have an IV placed in your hand or arm by the nurse, and sign any consent paperwork that is  needed for your procedure. You will speak to the surgeon and anesthesiologist. It is important to tell the doctors and nurses if you have had any significant side effects from pain medications or anesthesia such as a rash or severe nausea.    In the operating room, the anesthesiologist will attach monitors, give you oxygen through a mask, and give you medicine through the IV to fall asleep. After you are sleeping, the breathing tube will be placed. You will wake up on the stretcher.     During the surgery, your care partner can sit in the surgical waiting area. There are TV screens in that area that keep them informed of your progress.     In the recovery room, monitors will be attached that check your heart rate, blood pressure and oxygen levels. While you may not remember this part, a nurse is with you and constantly checking on your condition. Medications for pain and nausea will be given if you need them. Your family is not allowed in the recovery room. When you are ready to leave the recovery room, you will be transported on your stretcher to the inpatient unit accompanied by your family once a room is available. When you are ready to leave the recovery room, you will go to the same day surgery discharge area. Your family may join you there as you continue to wake up. You will be offered something to eat and drink and be given pain pills if needed. You will get your discharge instructions from the nurse and go home from there.  On the inpatient unit, a team of doctors and advanced practice providers will manage your care. The spine care nurses will check your blood pressure, temperature, heartbeat, breathing, stomach sounds and your incision. They may assess the strength and sensation in your arms and legs. Medications that are given in the hospital include antibiotics, IV fluids, pain medications, muscle relaxers, stool softeners, and your home medications. You may get blood drawn and another x-ray. Physical  and occupational therapists may come to your room to teach you how to move around safely after surgery.     Post Op Plan   The average length of hospital stay is zero to one days.     Preventing surgical complications  It is important to follow all instructions before and after surgery to decrease the risks of surgery and prevent complications.     Blood clots: walk, wear leg compression devices in the hospital, and do ankle pumps at home  Infection: wash with Hibiclens before surgery, wash your hands, don't touch your incision  Pneumonia: take deep breaths and cough and use the breathing exercise (incentive spirometer)   Nausea/vomiting: start with liquids and small meals and do not take pills on an empty stomach  Constipation: drink water and walk frequently    Tell your nurse if you are experiencing nausea, vomiting or constipation as they have medications to help treat these.      At home     Understanding Pain After Surgery  We will do our best to manage your pain after surgery, but it is not possible to be completely pain-free. There will be operative pain in your back or neck. Pain in the arms or legs may be one of the first things to improve. Numbness, weakness, and tingling should improve over time. However, there can be a temporary increase in symptoms in the first few days due to inflammation from surgery.   Pain medications will be prescribed to take home at discharge. The goal of pain medicine after surgery is to make your pain tolerable, not to make you pain free. We encourage you to use the medication prescribed to you after your surgery, but please take the lowest possible dose to manage your pain. Taking high doses of narcotics can cause side effects. Transition to plain Tylenol when your pain improves. You may get more continuous pain relief by alternating between medications if you have multiple instead of taking them at the same time. Write down when you have taken a medication as it may be  difficult to remember after a few doses.    Post operative medication   Tylenol (acetaminophen): take for pain. Do not take more than 3000 mg - 4000 mg in 24 hours because it can damage your liver.   Narcotics: take for moderate to severe pain. Do not drink alcohol or drive while taking narcotics. Some narcotic medications (Norco, Tylenol #3, Percocet, Vicodin) contain Tylenol (acetaminophen). Make sure to not exceed the maximum dose if you are taking additional Tylenol with these medications.  Muscle relaxers: take for muscle cramping. These can cause drowsiness.    NSAIDS (Advil, Aleve, Motrin, ibuprofen, naproxen, meloxicam, diclofenac, celecoxib, etc.) or aspirin: Do not take these unless your physician says it is ok.  Stool softeners: take to prevent constipation while you are taking narcotic medications. You can get these over the counter at the pharmacy. You may use laxatives, which are stronger, if needed.    If you believe you will need more of medication your surgeon has prescribed to you, request a refill through your pharmacy or through the refill request in [facility] (log in, go to medications, then select refill request) at least two business days before you run out of medication.     Nonpharmacologic pain management   You may use ice on your incision for 20 minutes every hour to help with pain and swelling. Do not place ice directly on your skin. You can use heat to sooth your muscles but avoid placing heat directly on your incision. Make frequent position changes. You can do gentle stretching while avoiding significant bending or twisting. Use deep breathing techniques and distractions such as TV, music, reading, or games.     Movement restrictions  After surgery, no bending or twisting your neck or back. Do not lift anything over 10lbs (about the weight of a gallon of milk) or lift anything over your shoulders. Avoid pulling or pushing. You may use stairs while holding the handrail.  It is ok to ride  in the car but refrain from driving or traveling long distances until cleared to do so by your surgeon. You may not drive while taking narcotics or muscle relaxants.    Post Op Exercise   Walk frequently. Start with walking short distances and increase as you start to feel better. Do ankle pumps (bending at your ankles, bring your feet towards your head then point them towards the ground) 15-20 times every hour when awake to help prevent blood clots.     Your surgeon will let you know at your post operative appointment if you are ready to decrease your movement restrictions and increase your exercise. If you have questions in between appointments about lessening your restrictions, please contact the office.     You and your doctor will discuss how you are feeling as you heal and decide if outpatient physical therapy or a medical fitness referral is needed.    Caring for your incision  Always wash your hands before touching your incision. Your incision will be closed with sutures under the skin and skin glue or Steri-Strips (thin white bandages) on top of the skin. Do not attempt to peal off Skin glue/Steri-Strips as they will come off on their own. If the incision is closed with sutures or staples on top of the skin, they will be removed at a post op appointment. The incision may be covered with a gauze dressing that can come off after three days. Once the original gauze dressing is removed, we prefer that you leave your incision open to air (without a gauze dressing). If the incision has drainage or is rubbing against your clothes or brace, you may place gauze and medical tape over it. Change the gauze and medical tape daily. Look at your incision daily to check for signs of infection.  You can shower three days after your surgery or sooner if your surgeon allows. We recommend the care partner be present during the first shower for safety. Let soapy water run over the incision, but do not scrub it or spray it  directly. Gently pat it dry after with a clean towel. Do not apply any creams or lotions to the incision. Do not soak in a tub, pool, or any body of water until your incision is fully healed.    Signs of Infection   Check your incision daily for swelling, redness, drainage, pus, bad smell, or opening of the incision.     When to Call for Assistance  Call the Neurosurgery Office ([phone] Option #2) if you experience signs of infection, opening of the incision, continuous nausea or vomiting, poor pain control despite using the pain medication as directed, a sudden increase in pain, temperature over 101F, difficulty swallowing, leg swelling, or with any concerns, unanswered questions, or new problems, such as new numbness/weakness/tingling.  Call 911 or go to the nearest emergency room if you experience chest pain, difficulty breathing, loss of bowel or bladder control, extreme drowsiness, or any other life-threatening situation.     Follow-up Plan   Appointments with Dr. Sampson or Aron at 2-3, 4-6 and 12 weeks    Answered questions regarding: None     You can contact the RN Spine Navigator at [phone] or [email or web address] with additional questions or feedback on your care. It may take several business days to receive a reply so please do not call the RN spine navigator for refills or for emergencies.    Spine navigator spent 54 minutes conducting a virtual visit to provide education. Thank you for letting the RN Spine Navigator participate in your care.

## 2024-04-02 ENCOUNTER — PATIENT OUTREACH (OUTPATIENT)
Dept: CASE MANAGEMENT | Age: 54
End: 2024-04-02

## 2024-04-02 ENCOUNTER — ANESTHESIA (OUTPATIENT)
Dept: SURGERY | Facility: HOSPITAL | Age: 54
End: 2024-04-02
Payer: COMMERCIAL

## 2024-04-02 ENCOUNTER — HOSPITAL ENCOUNTER (OUTPATIENT)
Facility: HOSPITAL | Age: 54
Setting detail: HOSPITAL OUTPATIENT SURGERY
Discharge: HOME | End: 2024-04-02
Attending: STUDENT IN AN ORGANIZED HEALTH CARE EDUCATION/TRAINING PROGRAM | Admitting: STUDENT IN AN ORGANIZED HEALTH CARE EDUCATION/TRAINING PROGRAM
Payer: COMMERCIAL

## 2024-04-02 ENCOUNTER — ANESTHESIA EVENT (OUTPATIENT)
Dept: SURGERY | Facility: HOSPITAL | Age: 54
End: 2024-04-02
Payer: COMMERCIAL

## 2024-04-02 ENCOUNTER — APPOINTMENT (OUTPATIENT)
Dept: GENERAL RADIOLOGY | Facility: HOSPITAL | Age: 54
End: 2024-04-02
Attending: STUDENT IN AN ORGANIZED HEALTH CARE EDUCATION/TRAINING PROGRAM
Payer: COMMERCIAL

## 2024-04-02 VITALS
DIASTOLIC BLOOD PRESSURE: 72 MMHG | WEIGHT: 182 LBS | TEMPERATURE: 98 F | HEIGHT: 61 IN | BODY MASS INDEX: 34.36 KG/M2 | HEART RATE: 65 BPM | OXYGEN SATURATION: 100 % | RESPIRATION RATE: 18 BRPM | SYSTOLIC BLOOD PRESSURE: 110 MMHG

## 2024-04-02 DIAGNOSIS — M47.816 LUMBAR SPONDYLOSIS: ICD-10-CM

## 2024-04-02 DIAGNOSIS — Z98.890 S/P LUMBAR MICRODISCECTOMY: Primary | ICD-10-CM

## 2024-04-02 DIAGNOSIS — M54.59 MECHANICAL LOW BACK PAIN: ICD-10-CM

## 2024-04-02 DIAGNOSIS — M48.061 LUMBAR FORAMINAL STENOSIS: ICD-10-CM

## 2024-04-02 DIAGNOSIS — M54.59 LUMBAR TRIGGER POINT SYNDROME: ICD-10-CM

## 2024-04-02 DIAGNOSIS — M51.37 DDD (DEGENERATIVE DISC DISEASE), LUMBOSACRAL: ICD-10-CM

## 2024-04-02 DIAGNOSIS — M51.16 LUMBAR DISC HERNIATION WITH RADICULOPATHY: ICD-10-CM

## 2024-04-02 DIAGNOSIS — M54.16 LUMBAR RADICULOPATHY: ICD-10-CM

## 2024-04-02 DIAGNOSIS — M51.36 BULGE OF LUMBAR DISC WITHOUT MYELOPATHY: ICD-10-CM

## 2024-04-02 DIAGNOSIS — M47.816 FACET SYNDROME, LUMBAR: ICD-10-CM

## 2024-04-02 LAB — RH BLOOD TYPE: POSITIVE

## 2024-04-02 PROCEDURE — 88311 DECALCIFY TISSUE: CPT | Performed by: STUDENT IN AN ORGANIZED HEALTH CARE EDUCATION/TRAINING PROGRAM

## 2024-04-02 PROCEDURE — 88304 TISSUE EXAM BY PATHOLOGIST: CPT | Performed by: STUDENT IN AN ORGANIZED HEALTH CARE EDUCATION/TRAINING PROGRAM

## 2024-04-02 PROCEDURE — 0SB20ZZ EXCISION OF LUMBAR VERTEBRAL DISC, OPEN APPROACH: ICD-10-PCS | Performed by: STUDENT IN AN ORGANIZED HEALTH CARE EDUCATION/TRAINING PROGRAM

## 2024-04-02 PROCEDURE — 76000 FLUOROSCOPY <1 HR PHYS/QHP: CPT | Performed by: STUDENT IN AN ORGANIZED HEALTH CARE EDUCATION/TRAINING PROGRAM

## 2024-04-02 RX ORDER — ONDANSETRON 2 MG/ML
INJECTION INTRAMUSCULAR; INTRAVENOUS AS NEEDED
Status: DISCONTINUED | OUTPATIENT
Start: 2024-04-02 | End: 2024-04-02 | Stop reason: SURG

## 2024-04-02 RX ORDER — MIDAZOLAM HYDROCHLORIDE 1 MG/ML
INJECTION INTRAMUSCULAR; INTRAVENOUS AS NEEDED
Status: DISCONTINUED | OUTPATIENT
Start: 2024-04-02 | End: 2024-04-02 | Stop reason: SURG

## 2024-04-02 RX ORDER — METOCLOPRAMIDE HYDROCHLORIDE 5 MG/ML
10 INJECTION INTRAMUSCULAR; INTRAVENOUS ONCE
Status: COMPLETED | OUTPATIENT
Start: 2024-04-02 | End: 2024-04-02

## 2024-04-02 RX ORDER — ACETAMINOPHEN 500 MG
1000 TABLET ORAL ONCE
Status: COMPLETED | OUTPATIENT
Start: 2024-04-02 | End: 2024-04-02

## 2024-04-02 RX ORDER — MORPHINE SULFATE 4 MG/ML
4 INJECTION, SOLUTION INTRAMUSCULAR; INTRAVENOUS EVERY 10 MIN PRN
Status: DISCONTINUED | OUTPATIENT
Start: 2024-04-02 | End: 2024-04-02

## 2024-04-02 RX ORDER — METHOCARBAMOL 500 MG/1
500 TABLET, FILM COATED ORAL 3 TIMES DAILY PRN
Qty: 60 TABLET | Refills: 0 | Status: SHIPPED | OUTPATIENT
Start: 2024-04-02

## 2024-04-02 RX ORDER — NALOXONE HYDROCHLORIDE 0.4 MG/ML
0.08 INJECTION, SOLUTION INTRAMUSCULAR; INTRAVENOUS; SUBCUTANEOUS AS NEEDED
Status: DISCONTINUED | OUTPATIENT
Start: 2024-04-02 | End: 2024-04-02

## 2024-04-02 RX ORDER — SODIUM CHLORIDE, SODIUM LACTATE, POTASSIUM CHLORIDE, CALCIUM CHLORIDE 600; 310; 30; 20 MG/100ML; MG/100ML; MG/100ML; MG/100ML
INJECTION, SOLUTION INTRAVENOUS CONTINUOUS
Status: DISCONTINUED | OUTPATIENT
Start: 2024-04-02 | End: 2024-04-02

## 2024-04-02 RX ORDER — MORPHINE SULFATE 10 MG/ML
6 INJECTION, SOLUTION INTRAMUSCULAR; INTRAVENOUS EVERY 10 MIN PRN
Status: DISCONTINUED | OUTPATIENT
Start: 2024-04-02 | End: 2024-04-02

## 2024-04-02 RX ORDER — ROCURONIUM BROMIDE 10 MG/ML
INJECTION, SOLUTION INTRAVENOUS AS NEEDED
Status: DISCONTINUED | OUTPATIENT
Start: 2024-04-02 | End: 2024-04-02 | Stop reason: SURG

## 2024-04-02 RX ORDER — HYDROMORPHONE HYDROCHLORIDE 1 MG/ML
0.2 INJECTION, SOLUTION INTRAMUSCULAR; INTRAVENOUS; SUBCUTANEOUS EVERY 5 MIN PRN
Status: DISCONTINUED | OUTPATIENT
Start: 2024-04-02 | End: 2024-04-02

## 2024-04-02 RX ORDER — METHYLPREDNISOLONE ACETATE 40 MG/ML
INJECTION, SUSPENSION INTRA-ARTICULAR; INTRALESIONAL; INTRAMUSCULAR; SOFT TISSUE AS NEEDED
Status: DISCONTINUED | OUTPATIENT
Start: 2024-04-02 | End: 2024-04-02 | Stop reason: HOSPADM

## 2024-04-02 RX ORDER — OXYCODONE HYDROCHLORIDE 5 MG/1
5 TABLET ORAL ONCE
Status: COMPLETED | OUTPATIENT
Start: 2024-04-02 | End: 2024-04-02

## 2024-04-02 RX ORDER — ACETAMINOPHEN 500 MG
500 TABLET ORAL EVERY 4 HOURS PRN
Qty: 120 TABLET | Refills: 0 | Status: SHIPPED | OUTPATIENT
Start: 2024-04-02

## 2024-04-02 RX ORDER — OXYCODONE HYDROCHLORIDE 5 MG/1
5 TABLET ORAL EVERY 4 HOURS PRN
Qty: 30 TABLET | Refills: 0 | Status: SHIPPED | OUTPATIENT
Start: 2024-04-02

## 2024-04-02 RX ORDER — HYDROMORPHONE HYDROCHLORIDE 1 MG/ML
0.6 INJECTION, SOLUTION INTRAMUSCULAR; INTRAVENOUS; SUBCUTANEOUS EVERY 5 MIN PRN
Status: DISCONTINUED | OUTPATIENT
Start: 2024-04-02 | End: 2024-04-02

## 2024-04-02 RX ORDER — PHENYLEPHRINE HCL 10 MG/ML
VIAL (ML) INJECTION AS NEEDED
Status: DISCONTINUED | OUTPATIENT
Start: 2024-04-02 | End: 2024-04-02 | Stop reason: SURG

## 2024-04-02 RX ORDER — GLYCOPYRROLATE 0.2 MG/ML
INJECTION, SOLUTION INTRAMUSCULAR; INTRAVENOUS AS NEEDED
Status: DISCONTINUED | OUTPATIENT
Start: 2024-04-02 | End: 2024-04-02 | Stop reason: SURG

## 2024-04-02 RX ORDER — NALOXONE HYDROCHLORIDE 4 MG/.1ML
4 SPRAY NASAL AS NEEDED
Qty: 1 KIT | Refills: 0 | Status: SHIPPED | OUTPATIENT
Start: 2024-04-02

## 2024-04-02 RX ORDER — DEXAMETHASONE SODIUM PHOSPHATE 4 MG/ML
VIAL (ML) INJECTION AS NEEDED
Status: DISCONTINUED | OUTPATIENT
Start: 2024-04-02 | End: 2024-04-02 | Stop reason: SURG

## 2024-04-02 RX ORDER — BUPIVACAINE HYDROCHLORIDE 2.5 MG/ML
INJECTION, SOLUTION EPIDURAL; INFILTRATION; INTRACAUDAL AS NEEDED
Status: DISCONTINUED | OUTPATIENT
Start: 2024-04-02 | End: 2024-04-02 | Stop reason: HOSPADM

## 2024-04-02 RX ORDER — FAMOTIDINE 20 MG/1
20 TABLET, FILM COATED ORAL ONCE
Status: COMPLETED | OUTPATIENT
Start: 2024-04-02 | End: 2024-04-02

## 2024-04-02 RX ORDER — HYDROMORPHONE HYDROCHLORIDE 1 MG/ML
0.4 INJECTION, SOLUTION INTRAMUSCULAR; INTRAVENOUS; SUBCUTANEOUS EVERY 5 MIN PRN
Status: DISCONTINUED | OUTPATIENT
Start: 2024-04-02 | End: 2024-04-02

## 2024-04-02 RX ORDER — NEOSTIGMINE METHYLSULFATE 1 MG/ML
INJECTION, SOLUTION INTRAVENOUS AS NEEDED
Status: DISCONTINUED | OUTPATIENT
Start: 2024-04-02 | End: 2024-04-02 | Stop reason: SURG

## 2024-04-02 RX ORDER — FAMOTIDINE 10 MG/ML
20 INJECTION, SOLUTION INTRAVENOUS ONCE
Status: COMPLETED | OUTPATIENT
Start: 2024-04-02 | End: 2024-04-02

## 2024-04-02 RX ORDER — CEFAZOLIN SODIUM/WATER 2 G/20 ML
2 SYRINGE (ML) INTRAVENOUS ONCE
Status: COMPLETED | OUTPATIENT
Start: 2024-04-02 | End: 2024-04-02

## 2024-04-02 RX ORDER — MORPHINE SULFATE 4 MG/ML
2 INJECTION, SOLUTION INTRAMUSCULAR; INTRAVENOUS EVERY 10 MIN PRN
Status: DISCONTINUED | OUTPATIENT
Start: 2024-04-02 | End: 2024-04-02

## 2024-04-02 RX ORDER — METOCLOPRAMIDE 10 MG/1
10 TABLET ORAL ONCE
Status: COMPLETED | OUTPATIENT
Start: 2024-04-02 | End: 2024-04-02

## 2024-04-02 RX ADMIN — PHENYLEPHRINE HCL 40 MCG: 10 MG/ML VIAL (ML) INJECTION at 08:29:00

## 2024-04-02 RX ADMIN — ROCURONIUM BROMIDE 10 MG: 10 INJECTION, SOLUTION INTRAVENOUS at 08:37:00

## 2024-04-02 RX ADMIN — NEOSTIGMINE METHYLSULFATE 5 MG: 1 INJECTION, SOLUTION INTRAVENOUS at 08:59:00

## 2024-04-02 RX ADMIN — MIDAZOLAM HYDROCHLORIDE 2 MG: 1 INJECTION INTRAMUSCULAR; INTRAVENOUS at 07:35:00

## 2024-04-02 RX ADMIN — CEFAZOLIN SODIUM/WATER 2 G: 2 G/20 ML SYRINGE (ML) INTRAVENOUS at 07:50:00

## 2024-04-02 RX ADMIN — ROCURONIUM BROMIDE 40 MG: 10 INJECTION, SOLUTION INTRAVENOUS at 07:37:00

## 2024-04-02 RX ADMIN — ONDANSETRON 4 MG: 2 INJECTION INTRAMUSCULAR; INTRAVENOUS at 08:51:00

## 2024-04-02 RX ADMIN — GLYCOPYRROLATE 0.8 MG: 0.2 INJECTION, SOLUTION INTRAMUSCULAR; INTRAVENOUS at 08:59:00

## 2024-04-02 RX ADMIN — DEXAMETHASONE SODIUM PHOSPHATE 4 MG: 4 MG/ML VIAL (ML) INJECTION at 08:51:00

## 2024-04-02 NOTE — ANESTHESIA PROCEDURE NOTES
Airway  Date/Time: 4/2/2024 7:39 AM  Urgency: Elective      General Information and Staff    Patient location during procedure: OR  Anesthesiologist: Crystal Napier MD  Performed: anesthesiologist   Performed by: Crystal Napier MD  Authorized by: Crystal Napier MD      Indications and Patient Condition  Indications for airway management: anesthesia  Sedation level: deep  Preoxygenated: yes  Patient position: sniffing  Mask difficulty assessment: 1 - vent by mask    Final Airway Details  Final airway type: endotracheal airway      Successful airway: ETT  Cuffed: yes   Successful intubation technique: direct laryngoscopy  Endotracheal tube insertion site: oral  Blade: Buzz  Blade size: #4  ETT size (mm): 7.0    Cormack-Lehane Classification: grade I - full view of glottis  Placement verified by: capnometry   Measured from: teeth  ETT to teeth (cm): 19  Number of attempts at approach: 1

## 2024-04-02 NOTE — ANESTHESIA PREPROCEDURE EVALUATION
Anesthesia PreOp Note    HPI:     Beryl Miles is a 53 year old female who presents for preoperative consultation requested by: Braulio Sampson MD    Date of Surgery: 4/2/2024    Procedure(s):  Left minimally invasive Lumbar 5 to Sacral 1 laminoforaminotomy, partial medial facetectomy  Indication: DDD (degenerative disc disease), lumbosacral [M51.37]  Mechanical low back pain [M54.59]  Lumbar radiculopathy [M54.16]  Lumbar spondylosis [M47.816]  Lumbar foraminal stenosis [M48.061]  Bulge of lumbar disc without myelopathy [M51.36]  Lumbar disc herniation with radiculopathy [M51.16]  Facet syndrome, lumbar [M47.816]  Lumbar trigger point syndrome [M54.59]    Relevant Problems   No relevant active problems       NPO:  Last Liquid Consumption Date: 04/01/24  Last Liquid Consumption Time: 2000  Last Solid Consumption Date: 04/01/24  Last Solid Consumption Time: 1930  Last Liquid Consumption Date: 04/01/24          History Review:  Patient Active Problem List    Diagnosis Date Noted    Chronic interstitial cystitis 03/21/2024    Constipation 03/21/2024    Hyperthyroidism 03/21/2024    Lower esophageal ring 03/21/2024    Migraine with aura 03/21/2024    Neck pain 03/21/2024    Osteoarthritis of knee 03/21/2024    Elbow strain, left, initial encounter 01/16/2023    Internal hemorrhoids     Chronic idiopathic constipation 04/08/2022    Gastroesophageal reflux disease 04/08/2022    Mechanical low back pain 11/15/2021    Lumbar spondylosis 11/15/2021    Lumbar disc herniation with radiculopathy 11/15/2021    Lumbar foraminal stenosis 11/15/2021    Bulge of lumbar disc without myelopathy 11/15/2021    DDD (degenerative disc disease), lumbosacral 11/15/2021    Bilateral carpal tunnel syndrome 11/15/2021    Elbow pain, chronic, right 11/15/2021    Numbness in both hands 11/15/2021    Hyperlipidemia 11/15/2021    NSAID induced gastritis 11/15/2021    Medial epicondylitis of elbow, left 11/15/2021    Mass of breast  04/13/2019    Heel pain 11/06/2014    Plantar fasciitis 10/30/2014       Past Medical History:   Diagnosis Date    Esophageal reflux     High cholesterol     Lumbar spondylosis     Migraines     PONV (postoperative nausea and vomiting)     Screen for colon cancer 2022    repeat CLN in 10 years       Past Surgical History:   Procedure Laterality Date    COLONOSCOPY SCREENING - REFERRAL N/A 7/5/2022    Procedure: COLONOSCOPY-SCREENING;  Surgeon: CB Gann MD;  Location: [facility] ENDOSCOPY    HYSTERECTOMY  2017       Medications Prior to Admission   Medication Sig Dispense Refill Last Dose    omeprazole 20 MG Oral Capsule Delayed Release Take 1 capsule (20 mg total) by mouth daily. (Patient taking differently: Take 1 capsule (20 mg total) by mouth every morning.) 90 capsule 1 4/1/2024    rosuvastatin (CRESTOR) 40 MG Oral Tab Take 1 tablet (40 mg total) by mouth nightly. 90 tablet 1 4/1/2024     Current Facility-Administered Medications Ordered in Epic   Medication Dose Route Frequency Provider Last Rate Last Admin    lactated ringers infusion   Intravenous Continuous Braulio Sampson MD 20 mL/hr at 04/02/24 0619 New Bag at 04/02/24 0619    ceFAZolin (Ancef) 2 g in 20mL IV syringe premix  2 g Intravenous Once Braulio Sampson MD         No current [facility]-ordered outpatient medications on file.       No Known Allergies    Family History   Problem Relation Age of Onset    No Known Problems Father     No Known Problems Mother     Breast Cancer Neg      Social History     Socioeconomic History    Marital status:    Tobacco Use    Smoking status: Never    Smokeless tobacco: Never   Vaping Use    Vaping Use: Never used   Substance and Sexual Activity    Alcohol use: Never    Drug use: Never   Other Topics Concern    Caffeine Concern Yes     Comment: coffee daily    Exercise Yes     Comment: cardio       Available pre-op labs reviewed.  Lab Results   Component Value Date    WBC 10.6 03/21/2024    RBC 4.32 03/21/2024     HGB 12.7 03/21/2024    HCT 37.2 03/21/2024    MCV 86.1 03/21/2024    MCH 29.4 03/21/2024    MCHC 34.1 03/21/2024    RDW 12.2 03/21/2024    .0 03/21/2024     Lab Results   Component Value Date     03/21/2024    K 3.9 03/21/2024     03/21/2024    CO2 25.0 03/21/2024    BUN 21 03/21/2024    CREATSERUM 0.69 03/21/2024    GLU 92 03/21/2024    CA 8.9 03/21/2024     Lab Results   Component Value Date    INR 0.89 03/21/2024       Vital Signs:  Body mass index is 34.39 kg/m².   height is 1.549 m (5' 1\") and weight is 82.6 kg (182 lb). Her oral temperature is 98.1 °F (36.7 °C). Her blood pressure is 110/78 and her pulse is 63. Her respiration is 18 and oxygen saturation is 97%.   Vitals:    03/27/24 1307 04/02/24 0604   BP:  110/78   Pulse:  63   Resp:  18   Temp:  98.1 °F (36.7 °C)   TempSrc:  Oral   SpO2:  97%   Weight: 83.5 kg (184 lb) 82.6 kg (182 lb)   Height: 1.549 m (5' 1\")         Anesthesia Evaluation     Patient summary reviewed and Nursing notes reviewed    History of anesthetic complications   Airway   Mallampati: II  TM distance: <3 FB  Neck ROM: full  Dental      Pulmonary - normal exam   Cardiovascular - normal exam  Exercise tolerance: good    Neuro/Psych    (+)  neuromuscular disease,        Comments: Low back pain radiating down the L leg    GI/Hepatic/Renal    (+) GERD    Endo/Other    Abdominal                  Anesthesia Plan:   ASA:  2  Plan:   General  Airway:  ETT  Post-op Pain Management: IV analgesics  Informed Consent Plan and Risks Discussed With:  Patient  Discussed plan with:  Surgeon      I have informed Beryl Miles and/or legal guardian or family member of the nature of the anesthetic plan, benefits, risks including possible dental damage if relevant, major complications, and any alternative forms of anesthetic management.   All of the patient's questions were answered to the best of my ability. The patient desires the anesthetic management as planned.  Crystal  MD Quyen  4/2/2024 7:15 AM  Present on Admission:  **None**

## 2024-04-02 NOTE — ANESTHESIA POSTPROCEDURE EVALUATION
Patient: Beryl Miles    Procedure Summary       Date: 04/02/24 Room / Location: [facility] MAIN OR [address] [place] [address] MAIN OR    Anesthesia Start: 0734 Anesthesia Stop: 0915    Procedure: Left minimally invasive Lumbar 5 to Sacral 1 laminoforaminotomy, partial medial facetectomy (Left: Spine Lumbar) Diagnosis:       DDD (degenerative disc disease), lumbosacral      Mechanical low back pain      Lumbar radiculopathy      Lumbar spondylosis      Lumbar foraminal stenosis      Bulge of lumbar disc without myelopathy      Lumbar disc herniation with radiculopathy      Facet syndrome, lumbar      Lumbar trigger point syndrome      (DDD (degenerative disc disease), lumbosacral [M51.37]Mechanical low back pain [M54.59]Lumbar radiculopathy [M54.16]Lumbar spondylosis [M47.816]Lumbar foraminal stenosis [M48.061]Bulge of lumbar disc without myelopathy [M51.36]Lumbar disc herniation with)    Surgeons: Braulio Sampson MD Anesthesiologist: Crystal Napier MD    Anesthesia Type: general ASA Status: 2            Anesthesia Type: general    Vitals Value Taken Time   /70 04/02/24 0915   Temp 97.1 °F (36.2 °C) 04/02/24 0915   Pulse 72 04/02/24 0915   Resp 16 04/02/24 0915   SpO2 100 % 04/02/24 0915       [facility] AN Post Evaluation:   Patient Evaluated in PACU  Patient Participation: complete - patient participated  Level of Consciousness: awake  Pain Score: 0  Pain Management: adequate  Airway Patency:patent  Dental exam unchanged from preop  Yes    Cardiovascular Status: acceptable  Respiratory Status: acceptable  Postoperative Hydration acceptable      Crystal Napier MD  4/2/2024 9:16 AM

## 2024-04-02 NOTE — DISCHARGE INSTRUCTIONS
Minimally Invasive Lumbar Microdiscectomy/Laminectomy Discharge Instructions  (Dr. Sampson)    Activity. Actividad  Restrictions. Restricciones.  No twisting, pulling, pushing or lifting > 10 lbs for the first month,  > 20 lbs for the second month, > 30 lbs for the third month.  No lifting anything over your head.  No girar, tirar, empujar ni levantar > 10 libras mariza el primer mes, > 20 libras mariza el briana mes, > 30 libras mariza el tercer mes.  No levantar nada por encima de la deepthi.    Sitting. Sentado.  Sit with your knees at about the same level as your hips; use a footstool if needed.  Do not sit in soft or overstuffed chairs. Firm chairs with straight backs give better support.   Recliners are OK but may need to add pillows in order to not sink into the chair.  Use a raised toilet seat if needed.  Change position every 20-30 minutes when sitting (example: after sitting, stand, then you can sit again for another 20-30 minutes).  Siéntese con las rodillas aproximadamente al mismo nivel que las caderas; use un taburete si es necesario.  No se siente en ryne blandas o mullidas. Las ryne firmes con respaldo recto brindan mejor apoyo.  Los sillones reclinables están aleksander, rosales es posible que sea necesario agregar almohadas para no hundirse en la silla.  Utilice un asiento de inodoro elevado si es necesario.  Cambie de posición cada 20-30 minutos cuando esté sentado (ejemplo: después de sentarse, párese, luego podrá volver a sentarse mariza otros 20-30 minutos).    Walking is your best exercise. Caminar es tu mejor ejercicio    Listen to your body. Escucha tu cuerpo    Walk several times a day  Smaller distances, but frequently are better.  Your goal is to increase the distance you walk each day.  Remember to listen to your body  Camine varias veces al día  Distancias más pequeñas, rosales frecuentemente son mejores.  Nicholson objetivo es aumentar la distancia que camina cada día.  Recuerda escuchar a tu  cuerpo    Sex  After 2 weeks, when comfortable and approved by your surgeon.  Stop if causing pain.  Después de 2 semanas, cuando le resulte cómodo y lo apruebe barnes cirujano.  Deténgase si le causa dolor.    Driving. Conduciendo  Usually allowed after 1-2 weeks; so long as you're not under the influence of narcotics (check with physician at first office visit).  Do Not drive while taking narcotics or muscle relaxants.  Adhere to sitting restrictions.  Generalmente se permite después de 1 a 2 semanas; siempre y cuando no esté bajo la influencia de narcóticos (consulte con barnes médico en barnes primera visita al consultorio).  No conduzca mientras esté tomando narcóticos o relajantes musculares.  Respete las restricciones para sentarse.    Stairs. Escaleras  Climb stairs as needed  Sube escaleras según sea necesario    Incision site care and dressing. Cuidado y vendaje del sitio de la incisión  Changes as directed by your surgeon. Cambios rustam lo indique barnes cirujana    YOUR INCISION IS CLOSED WITH ABSORBABLE SUTURES AND SKIN GLUE  May leave open to air. A clean 4x4 gauze may prevent clothing from rubbing incision.   Watch incision for any redness, drainage, increased warmth or opening of the incision. Call surgeon if you notice any of these.  No lotions or ointments on or near incision.  Always wash hands before and after touching incision.    BARNES INCISIÓN SE GILMER CON SUTURAS ABSORBIBLES Y PEGAMENTO PARA LA PIEL   Puede dejarse al aire coredll. Matilde gasa limpia de 4x4 puede evitar que la ropa roce la incisión.   Observe la incisión para elian si hay enrojecimiento, drenaje, aumento de calor o apertura de la incisión. Llame al cirujano si nota alguno de estos.   No se permiten lociones ni ungüentos sobre o cerca de la incisión.   Lávese siempre las apolinar antes y después de tocar la incisión.    Bathing. Zheng  No tub baths, pools, or saunas for 6 weeks and until cleared by surgeon.  When able to shower, you may have water run  down over the incision but do not scrub or pick off the glue.  After showering, pat incision dry and may apply and allow for incision to dry thoroughly before covering it.  Do not apply any lotions or ointments to incision.  No sera en tinas, piscinas ni saunas mariza 6 semanas y hasta que el cirujano lo autorice.  Cuando pueda ducharse, es posible que le corra agua sobre la incisión, rosales no frote ni quite el pegamento.  Después de ducharse, seque la incisión con palmaditas y puede aplicarla y deje que la incisión se seque completamente antes de cubrirla.  No aplique ninguna loción o ungüento a la incisión.     Return to work. Regreso a trabajo  When cleared by surgeon. Discuss specific work activities with your surgeon at follow up visit.  Light to sedentary work may be possible 2-3 weeks post op  Heavy work may be possible 6 weeks post op.  Cuando lo autorice el cirujano. Discuta actividades laborales específicas con barnes cirujano en la visita de seguimiento.  Es posible realizar trabajos ligeros o sedentarios 2 o 3 semanas después de la operación  Es posible que sea posible realizar trabajos pesados 6 semanas después de la operación.    Sleeping. Dormir  Use a firm mattress.  Lay on side or back, not stomach.  May use pillow under knees, between legs or behind back if lying on your side.  Utilice un colchón firme.  Acuéstese de costado o boca arriba, no boca abajo.  Puede usar kip almohada debajo de las rodillas, entre las piernas o detrás de la espalda si está acostado de lado.    Diet and constipation prevention. Dieta y prevención del estreñimiento.  Drink six to eight glasses liquid (water, juice) per day.  Eat a high fiber diet (bran, vegetables, fruit).  Use an over the counter stool softener such as Colace or senakot while taking narcotics to prevent constipation; use laxatives such as Miralax or Milk of Magnesia as needed.  An enema or suppository may be necessary if above measures do not work.  Hannah de  seis a ocho vasos de líquido (agua, jugo) por día.  Consuma kip dieta susana en fibra (salvado, verduras, frutas).  Utilice un ablandador de heces de venta cordell, rustam Colace o Senakot, mientras seth narcóticos para prevenir el estreñimiento; use laxantes rustam Miralax o Leche de Magnesia según sea necesario.  Puede ser necesario un enema o un supositorio si las medidas anteriores no funcionan.    No smoking. No fumar  Smoking will inhibit healing.  Even one cigarette a day will cause problems.  Chewing tobacco, nicotine gum or patches will also inhibit healing.  Fumar inhibirá la curación.  Incluso un cigarrillo al día causará problemas.  Masticar tabaco, chicle o parches de nicotina también inhibirá la curación.    Pain Management. El manejo del dolor  Relaxation techniques  A way to focus your attention other than on your pain. Your brain makes endorphins that are a natural body chemical that can decrease pain. Some examples of relaxation techniques are deep breathing, listening to music or meditating.  May use Cold therapy for 20 minutes multiple times per day.  Be sure there is a cloth barrier between skin and cold therapy.  Medication  Tylenol (acetaminophen) and Motrin (ibuprofen) are preferred. Caution if your pain med contains Tylenol (acetaminophen); maximum 3.5 gms of Tylenol (acetaminophen) in 24 hours. You can alternate between Tylenol and Motrin every 4 hrs.  A single aspirin (81 mg) for the heart is ok if ordered by your physician.  Take muscle relaxants and pain medications as prescribed allowing 30-45 minutes to take effect.  Do NOT take alcohol while on pain medication.  Monitor need for pain medication closely  Call pharmacy or physician office at least five days before out of pain medication. If calling physician office after 3 pm, it will be handled on the next business day.  Técnicas de relajación  Kip forma de centrar tu atención en algo más que en tu dolor. [place]olson cerebro produce endorfinas, que son  kip sustancia química natural del cuerpo que puede disminuir el dolor. Algunos ejemplos de técnicas de relajación son la respiración profunda, escuchar música o meditar.  Puede utilizar terapia de frío mariza 20 minutos varias veces al día. Asegúrese de que haya kip hobbs de cony entre la piel y la terapia de frío.  Medicamento  Se prefieren Tylenol (acetaminofeno) y Motrin (ibuprofeno). Tenga cuidado si barnes analgésico contiene Tylenol (acetaminofén); anshu 3,5 g de Tylenol (acetaminofeno) en 24 horas. Puede alternar entre Tylenol y Motrin cada 4 horas.  Kip torey aspirina (81 mg) para el corazón está aleksander si la receta barnes médico.  Maquon relajantes musculares y analgésicos según lo prescrito, dejando que surtan efecto entre 30 y 45 minutos.  NO tome alcohol mientras esté tomando analgésicos.  Vigilar de cerca la necesidad de analgésicos  Llame a la farmacia o al consultorio médico al menos le días antes de que se le acaben los analgésicos. Si llama al consultorio del médico después de las 3 p. m., se atenderá el siguiente día hábil.    Post op office visit. Visita al consultorio postoperatorio  Attend your follow up, already scheduled after surgery as directed at discharge  Schedule one week follow up with Primary Care Physician. Review all medications.  Asista a barnes seguimiento, ya programado después de la cirugía según las indicaciones del alfred.  Programe un seguimiento de kip semana con barnes médico de atención primaria. Revisar todos los medicamentos    When to contact your surgeon. Cuándo contactar a tu cirujana  Temp > 101F; Take your temperature twice a day  Increased pain, swelling, redness, or any drainage to incision.  Separation of incision.  Sudden reappearance of pain that won’t go away with pain medication.  New numbness or weakness to arms or legs.  Difficulty urinating or having bowel movements  Headaches that worsen when standing and resolve when laying flat.  Temperatura > 101F; Rosanna porter  dos veces al día.  Aumento del dolor, hinchazón, enrojecimiento o cualquier drenaje en la incisión.  Separación de la incisión.  Reaparición repentina de dolor que no desaparece con analgésicos.  [place] entumecimiento o debilidad en brazos o piernas.  Dificultad para orinar o defecar  Seema de deepthi que empeoran al estar de pie y desaparecen al acostarse.    Go directly to the ER or CALL 911 if you: Vaya directamente a la justin de emergencias o LLAME  si:  become short of breath  have chest pain  cough up blood  have unexplained anxiety with breathing  quedarse sin aliento  tiene dolor en el pecho  toser luis  tiene ansiedad inexplicable al respirar       CIRUGIA AMBULATORIA: INSTRUCCIONES DESPUES DE SRUTHI RECIBIDO ANESTESIA  Debido a la Anestesia y a las medicamentos que se le aplicaron mariza la cirugia, yessi reflejos y capacidaddiscernimiento pueden verse afectados. Tambien podria tener un poco de mareo.Aparte de siguir las precauciones de sentico comun, le recomendamos lo siguiente:     El paciente debe estar acompañado por alguien hasta la mañana siguiente.     No maneje ningun vehiculo automotor ni monte bicicleta.     No tome ninguna decision importante rustam por ejemplo firmar de documentos importantes.     No opere herramientas electricas ni electrodomesticos, tales rustam cuchillos electricos, batidoras electricas o serruchos electricos. No amina el cesped con cortadoras electricas. No practique deportes.     No sam ejercicio.     Para evitar las nauseas, coma menos de lo normal mas o menos la mitad de lo habitual y/o monse solo liquidos hasta la manana siguiente. Consulte con barnes doctor si esta llevando kip dieta especial.     No tome bebidas alcoholicas, tranquilizantes, pastilles para dormir etc., y verifique con barnes doctor acerca de cualquier medicamento que bernardo tomando actualmente.     El efecto de la medicación usada en barnes anestesia habra pasado schuyler por completo a la medianoche. Por lo tanto,  puede reanudar yessi habitos cotidianos en la mañana.     Los adultos deben descansar lo christina posible por las siguientes 24 horas. Los niños deben permanecer en cama lo christina posible por las siguientes 24 horas.     Si se presenta cualquier problema, puede llamar a barnes propio doctor personal o aceda al centro de [facility].    Si sigue estas instrucciones, se sentira major y estara mas seguro despues de barnes cirugia ambulatoria. Sitiene cualquier pregunta, llame al hospital y pida que lo comuniquen con la enfermera de cirugia [phone], extension [phone].        Instrucciones para el alfred: después de barnes cirugía   Acaba de someterse a kip cirugía. Mariza la cirugía, le administraron un tipo de medicamento llamado anestesia para que esté relajado y no sienta dolor. Después de la cirugía, marisol vez sienta algo de dolor o náuseas. [place] es común. Estos son algunos consejos para sentirse mejor y recuperarse aleksander después de la cirugía.   El regreso a casa  Barnes proveedor de atención médica le enseñará cómo cuidarse cuando regrese a barnes casa. También responderá yessi preguntas. Pida a un familiar o amigo adulto que lo conduzca a barnes casa. Mariza las primeras 24 horas después de la cirugía, siga estas recomendaciones:   No conduzca ni use maquinaria pesada.  No tome decisiones importantes ni firme ningún documento legal.  Adminístrese los medicamentos según las indicaciones.  Evite el consumo de alcohol.  Si es necesario, coordine para que alguien se quede con usted. Esta persona puede vigilar cualquier problema que se presente y lo ayudará a permanecer seguro.  Asegúrese de asistir a todas yessi visitas de control con barnes proveedor de atención médica. Y descanse después de la cirugía mariza el tiempo que le indique barnes proveedor.   Cómo sobrellevar el dolor  Si siente dolor después de la cirugía, los analgésicos lo ayudarán a sentirse mejor. [place] los analgésicos según las indicaciones, antes  de que el dolor se intensifique. Además, pregunte a barnes proveedor de atención médica o al farmacéutico acerca de otras formas de controlar el dolor. Estas podrían incluir aplicar calor o hielo, o hacer ejercicios de relajación. Y siga todas las instrucciones que le dé barnes cirujano o enfermero.      Cumpla el cronograma de yessi medicamentos.      Consejos para nancy analgésicos  Para aliviar el dolor lo anshu posible, recuerde estos puntos:   Los analgésicos pueden causar malestar estomacal. Tomarlos con un poco de comida puede aliviar kimberli efecto.  La mayoría de los calmantes que se festus por la boca necesitan por lo menos de 20 a 30 minutos para surtir efecto.  No espere hasta que barnse dolor se vuelva intenso para nancy el analgésico que le indicaron. Intente que el momento en que puede nancy barnes medicamento coincida con otra actividad. Kimberli podría ser el momento antes de vestirse, effie un paseo o sentarse a la apdoaca para cenar.  El estreñimiento es un efecto secundario frecuente de algunos analgésicos. Consulte a barnes proveedor de atención médica antes de usar cualquier medicamento, rustam laxantes o ablandadores de heces, para ayudar a aliviar el estreñimiento. También consulte si es preciso evitar algún tipo de alimento. Nancy mucha cantidad de líquido y comer alimentos rustam frutas y verduras con alto contenido de fibra también puede ser beneficioso. Recuerde que no debe nancy laxantes a menos que barnes cirujano se los indique.  Mezclar bebidas alcohólicas y analgésicos puede causar mareos y enlentecer barnes respiración. Y hasta puede ser mortal. No monse alcohol mientras esté tomando calmantes.  Los analgésicos pueden hacer que tenga reacciones más lentas. No conduzca ni opere maquinaria mientras esté tomando analgésicos.  Barnes proveedor de atención médica puede indicarle que tome acetaminofén (paracetamol) para ayudar a aliviar el dolor. Pregúntele qué cantidad debe nancy por día. El acetaminofén y otros analgésicos pueden  interactuar con yessi medicamentos recetados u otros medicamentos de venta cordell (OTC, por yessi siglas en inglés). Algunos medicamentos recetados contienen acetaminofén y otros ingredientes. Combinar medicamentos recetados y acetaminofén de venta cordell para aliviar el dolor puede provocarle kip sobredosis accidental. Alanna atentamente la etiqueta del envase de yessi medicamentos OTC. [place] lo ayudará a saber con exactitud la lista de ingredientes, la cantidad que debe ash y cualquier advertencia. [place] también puede ayudarlo a evitar ash demasiado acetaminofén. Si tiene preguntas o no entiende la información, pídale a barnes farmacéutico o proveedor de atención médica que se la explique antes de ash el medicamento OTC.   Manejo de las náuseas  Algunas personas pueden sentir malestar estomacal (náuseas) después de la cirugía. [place] suele suceder debido a la anestesia, el dolor, los analgésicos, la disminución del movimiento de la comida en el estómago o el estrés de la cirugía. Estos consejos lo ayudarán a manejar las náuseas y a comer alimentos más saludables mientras se recupera. Si seguía un plan alimentario especial antes de la cirugía, pregúntele a barnes proveedor de atención médica si debe continuarlo mientras se recupera. Consulte con barnes proveedor cómo debería continuar barnes alimentación. Esta puede variar según el tipo de cirugía a la que se sometió. Los siguientes consejos generales pueden serle útiles:   No se fuerce a comer. Guíese por barnes cuerpo para saber cuándo comer y qué cantidad.  Comience con líquidos transparentes y sopa. Estos son más fáciles de digerir.  Tan pronto rustam se sienta listo, intente comer alimentos semisólidos. Estos incluyen puré de gala, puré de manzana y gelatina.  Lentamente, pase a alimentos sólidos. Al principio no coma alimentos grasosos, pesados ni condimentados.  No se fuerce a hacer bartolome comidas grandes al día. En cambio, coma cantidades pequeñas, rosales con mayor frecuencia.  [place] los  analgésicos con kip pequeña cantidad de alimentos sólidos, rustam galletas saladas o kip tostada. [place] ayuda a prevenir las náuseas.  Cuándo llamar a barnes proveedor de atención médica   Llame de inmediato a barnes proveedor de atención médica si nota alguno de los siguientes síntomas:   Sigue teniendo mucho dolor, o el dolor empeora, después de ash el medicamento. Puede que el medicamento no sea lo suficientemente maris. O aleksander, puede rachel complicaciones de la cirugía.  Se siente demasiado somnoliento, mareado o adormecido. Quizás el medicamento sea demasiado maris.  Tiene efectos secundarios, rustam náuseas o vómitos. [place] proveedor de atención médica puede recomendarle ash otros medicamentos.  Tiene cambios en la piel, rustam sarpullido, picazón o urticaria. [place] puede significar que tiene kip reacción alérgica. [place] proveedor puede recomendarle ash otros medicamentos.  La incisión tiene un aspecto diferente (por ejemplo, se abre kip parte).  Tiene sangrado o supuración de líquido de la herida y no le dijeron que eso era esperable.  Fiebre de 100.4 °F (38 °C) o más, o según le indique barnes proveedor.  Cuándo llamar al 911  Llame al  911  de inmediato si tiene:   Dificultad para respirar  Sabi hinchada     Si tiene apnea del sueño obstructiva   [place] la cirugía, le administraron anestesia para que esté cómodo y no sienta dolor. Después de la cirugía, es probable que tenga más ataques de apnea causados por la anestesia y otros medicamentos que le administraron. Los ataques pueden durar más de lo habitual.    En barnes casa, sam lo siguiente:  Cuando duerma, siga usando barnes dispositivo de presión positiva continua en las vías respiratorias (CPAP, por yessi siglas en inglés). A menos que barnes proveedor de atención médica le indique lo contrario, úselo siempre que duerma, ya sea de día o de noche. El dispositivo de CPAP suele usarse para tratar la apnea obstructiva del sueño.  Consulte a barnes proveedor antes de ash cualquier  analgésico, relajante muscular o sedante. [place] proveedor le dará información sobre los peligros de ash estos medicamentos.  Comuníquese con barnes proveedor si tiene el sueño demasiado alterado, incluso cuando esté tomando los medicamentos según las instrucciones.  © 9464-5389 [employer]. Todos los derechos reservados. Esta información no pretende sustituir la atención médica profesional. Sólo barnes médico puede diagnosticar y tratar un problema de susan.

## 2024-04-02 NOTE — BRIEF OP NOTE
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]  Lumbar trigger point syndrome [M54.59]     Post-Operative Diagnosis: DDD (degenerative disc disease), lumbosacral [M51.37]Mechanical low back pain [M54.59]Lumbar radiculopathy [M54.16]Lumbar spondylosis [M47.816]Lumbar foraminal stenosis [M48.061]Bulge of lumbar disc without myelopathy [M51.36]Lumbar disc herniation with      Procedure Performed:   Left minimally invasive Lumbar 5 to Sacral 1 laminoforaminotomy, partial medial facetectomy    Surgeon(s) and Role:     * Braulio Sampson MD - Primary    Assistant(s):  PA: Aron Herrera PA-C     Surgical Findings: Left lateral recess stenosis, small left paracentral L5-S1 disc herniation     Specimen: Small fragments of left L5-S1 disc herniation sent for pathologic examination     Estimated Blood Loss: 5 mL    Braulio Sampson MD  Neurological Surgery    [facility] [address], [address] [address]  [phone]  Pager [phone]  4/2/2024 8:54 AM      This note was created using a voice-recognition transcribing system. Incorrect words or phrases may have been missed during proofreading. Please interpret accordingly.

## 2024-04-02 NOTE — OPERATIVE REPORT
[facility]  part of [facility]  Neurosurgery Operative Note         Beryl Miles Location: OR   CSN [id number] MRN [id number]   Admission Date 4/2/2024 Operation Date 4/2/2024   Attending Physician Braulio Sampson MD       Patient Name: Beryl Miles     Date of surgery:  4/2/2024    Surgeon:  Braulio Sampson MD     Assistant:  Aron Herrera PA-C (given the procedure's level of complexity, the help of an abled surgical assistant was necessary to ensure patient safety and preservation of critical structures)    Preoperative diagnosis:  Lumbalgia  Lumbar spondylosis with radiculopathy (left lateral recess stenosis with left S1 radiculopathy)  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  Use of intraoperative fluoroscopy including interpretation of x-rays  Use of operating microscope     Indications for procedure:  Ms. Miles is a 53-year-old female who was referred to my clinic with complaints of low back and left leg pain that had been unrelenting, refractory to nonoperative treatment including epidural steroid injections. She was neurologically intact on exam and her imaging studies demonstrated a left paracentral disc protrusion at L5-S1 that caused severe left lateral recess stenosis and impinged on the left traversing S1 nerve root.  Given the circumstances, I felt surgical treatment of her pathology was  indicated, could prove beneficial, and thus recommended it to her. Prior to surgery and during preoperative counseling, the risks, benefits, and potential outcomes of the surgical intervention were outlined to Ms. Miles using language she could comprehend. She expressed her understanding and elected to proceed.      Procedure in detail:  The patient was identified and taken to the operating room. She was placed under general endotracheal anesthesia without complication. She was positioned prone on a Migel frame, which laid on top of a flat William table. Her shoulders were abducted and arms flexed to 90 degrees. All pressure points were appropriately padded. She was firmly secured to the operating table. She received the appropriate preoperative antibiotic prophylaxis. Sequential compression devices were maintained on the lower extremities for DVT prophylaxis. The lumbar region was identified. It was prepped and draped in sterile fashion. An appropriate time out was performed following standard protocol. Under fluoroscopic guidance, a spinal needle was used to localize the L5-S1 interspace and accordingly plan the surgical incision.    A 2 cm incision was made 1.5 cm to the left of midline overlying the L5-S1 interspace.  Using a guidewire, a series of sequential dilators and tubular retractor, access was gained to the inferior aspect of the left L5 lamina, the medial aspect of the left L5-S1 facet joint and left L5-S1 interlaminar space. Intraoperative fluoroscopy was used to confirm adequate positioning of the tubular apparatus after which the operating microscope was brought in for better visualization. Under microscopic magnification, soft tissue dissection was performed exposing the aforementioned landmarks. A high-speed drill was then used to perform a left inferior laminotomy of L5. The bony resection was taken superiorly until the ligamentum flavum could be detached from the undersurface of the left L5  lamina. The ligamentum flavum was elevated and resected, exposing the dura beneath. The left L5-S1 lateral recess decompression was then performed using a series of rongeurs and curettes. Safe exposure to the nerve root necessitated a partial medial facetectomy. This was done until the lateral border of the dura and traversing left S1 nerve root was identified and freed dorsally along its course within the subarticular compartment to the level of the left S1 pedicle. We retracted the lateral border of the dura and the left S1 nerve root medially exposing the subannular disc protrusion at L5-S1.  We coagulated the overlying epidural vasculature, incised the annulus and removed multiple herniated disc fragments piecemeal until the posterior aspect of the left L5-S1 disc was flush with the posterior aspect of the L5 and S1 vertebral bodies. Afterwards, we achieved satisfactory hemostasis and again tracked the path of the left S1 nerve root within the subarticular compartment to the level of the left S1 pedicle, found it to be satisfactorily decompressed along its ventral and dorsal aspects. We again achieved adequate hemostasis, irrigated the surgical field and placed 40 mg of Depo-Medrol into the epidural space overlying the left L5-S1 lateral recess and the left S1 nerve root.  We then pulled out the tubular retractor, ensuring to coagulate any actively bleeding muscle tissue on the way out. The surgical incision was then closed in multiple layers and reapproximated at the skin with 4-0 Monocryl in a subcuticular fashion. The patient was then allowed to emerge from general anesthesia and was subsequently extubated. She was taken to the recovery room for further convalescence. The complexity of the case was higher than average given the patient's body habitus, which prolonged surgery over the anticipated time due to difficult positioning, exposure, and wound closure.    Anesthesia: General endotracheal    Estimated  blood loss: 5 mL    Counts: All needle, sponge, and cottonoid counts were reported correct at the end of the operation.     Complications: None     Drains: None     Implants: None     Specimen:   ID Type Source Tests Collected by Time Destination   1 : 1. Left Lumbar 5 - Sacral 1 Disc Tissue Tissue SURGICAL PATHOLOGY TISSUE Braulio Sampson MD 4/2/2024  8:38 AM       Wound classification: 1, clean    Disposition: Home to self care     Condition: Stable, neurologically at baseline    Braulio Sampson MD  Neurological Surgery    [address], [address] [place] [id number]  [phone]  Pager [phone]  4/2/2024 8:56 AM      This note was created using a voice-recognition transcribing system. Incorrect words or phrases may have been missed during proofreading. Please interpret accordingly.

## 2024-04-10 ENCOUNTER — HOSPITAL ENCOUNTER (OUTPATIENT)
Age: 54
Discharge: HOME | End: 2024-04-10
Payer: COMMERCIAL

## 2024-04-10 VITALS
HEART RATE: 81 BPM | TEMPERATURE: 97 F | OXYGEN SATURATION: 99 % | SYSTOLIC BLOOD PRESSURE: 104 MMHG | DIASTOLIC BLOOD PRESSURE: 67 MMHG | RESPIRATION RATE: 18 BRPM

## 2024-04-10 DIAGNOSIS — H66.92 LEFT OTITIS MEDIA, UNSPECIFIED OTITIS MEDIA TYPE: Primary | ICD-10-CM

## 2024-04-10 PROCEDURE — 99203 OFFICE O/P NEW LOW 30 MIN: CPT | Performed by: NURSE PRACTITIONER

## 2024-04-10 RX ORDER — AMOXICILLIN AND CLAVULANATE POTASSIUM 875; 125 MG/1; MG/1
1 TABLET, FILM COATED ORAL 2 TIMES DAILY
Qty: 14 TABLET | Refills: 0 | Status: SHIPPED | OUTPATIENT
Start: 2024-04-10 | End: 2024-04-17

## 2024-04-10 NOTE — ED PROVIDER NOTES
Patient Seen in: [facility]      History     Chief Complaint   Patient presents with    Ear Problem Pain     Stated Complaint: HEADACHE AND EAR PAIN    Subjective:   Well-appearing 53-year-old female with esophageal reflux, hyperlipidemia and migraine headaches presents with complaints of right ear fullness/pressure for the past several days.  Patient communicates that ear symptoms are causing a migraine as well.  Patient communicates that she has been taking ibuprofen for pain with relief.  No over-the-counter medications have been taken for symptoms.  Patient denies recent illness or URI symptoms.  Patient denies fever.  Patient denies ear drainage.          Objective:   Past Medical History:    Esophageal reflux    High cholesterol    Lumbar spondylosis    Migraines    PONV (postoperative nausea and vomiting)    Screen for colon cancer    repeat CLN in 10 years              Past Surgical History:   Procedure Laterality Date    Colonoscopy screening - referral N/A 7/5/2022    Procedure: COLONOSCOPY-SCREENING;  Surgeon: CB Gann MD;  Location: [facility] ENDOSCOPY    Hysterectomy  2017                Social History     Socioeconomic History    Marital status:    Tobacco Use    Smoking status: Never    Smokeless tobacco: Never   Vaping Use    Vaping status: Never Used   Substance and Sexual Activity    Alcohol use: Never    Drug use: Never   Other Topics Concern    Caffeine Concern Yes     Comment: coffee daily    Exercise Yes     Comment: cardio   Social History Narrative    The patient does not use an assistive device..      The patient does live in a home with stairs.                  Review of Systems    Positive for stated complaint: HEADACHE AND EAR PAIN  Other systems are as noted in HPI.  Constitutional and vital signs reviewed.      All other systems reviewed and negative except as noted above.    Physical Exam     ED Triage Vitals [04/10/24 1534]   /67   Pulse 81   Resp 18    Temp 97.1 °F (36.2 °C)   Temp src Temporal   SpO2 99 %   O2 Device None (Room air)       Current:/67   Pulse 81   Temp 97.1 °F (36.2 °C) (Temporal)   Resp 18   SpO2 99%         Physical Exam  VS: Vital signs reviewed. 02 saturation within normal limits for this patient.    General: Patient is awake and alert, oriented to person, place and time. Pt appears non-toxic.     HEENT: Head is normocephalic, atraumatic. Nonicteric sclera, no conjunctival injection. No facial droop or slurred speech. No oral lesions or pallor. Mucous membranes moist.      Right Ear: Ear canal and external ear normal. A middle ear effusion is present.      Left Ear: Ear canal and external ear normal. A middle ear effusion is present. Tympanic membrane is injected.      Nose: No congestion or rhinorrhea.      Mouth/Throat:      Lips: Pink.      Mouth: Mucous membranes are moist.      Pharynx: Oropharynx is clear.     Neck: No cervical lymphadenopathy. Supple. Normal ROM.    Lungs: Good inspiratory effort. No accessory muscle use or tachypnea.    Extremities: No focal swelling or tenderness. Capillary refill noted.     Skin: Warm, dry and normal in color.     Psychiatric: Normal affect, judgement normal, insight normal.     CNS: Moves all 4 extremities. Interacts appropriately. No gait abnormality. Memory normal.        ED Course   Labs Reviewed - No data to display    MDM   Medical Decision Making  Well-appearing.  Prescription for Augmentin twice daily x 7 days was sent to pharmacy on file for treatment of otitis media.  I also discussed over-the-counter pseudoephedrine for symptoms and over-the-counter acetaminophen as well.  Patient communicates that 8 days ago she had lower back surgery, left minimally invasive Lumbar 5 to Sacral 1 laminoforaminotomy, partial medial facetectomy, communicates recovering well from this.  Close PMD follow-up as well as return precautions discussed.    Differential diagnosis considered included  otitis media versus otitis externa versus migraine headache.      Problems Addressed:  Left otitis media, unspecified otitis media type: acute illness or injury    Amount and/or Complexity of Data Reviewed  External Data Reviewed: notes.     Details: 04/02/2024    Risk  OTC drugs.  Prescription drug management.        Disposition and Plan     Clinical Impression:  1. Left otitis media, unspecified otitis media type         Disposition:  Discharge  4/10/2024  4:06 pm    Follow-up:  Sanchez Corona MD  [address] [facility] [address]  [facility] [address]  [phone]    In 1 week  As needed          Medications Prescribed:  Discharge Medication List as of 4/10/2024  4:08 PM        START taking these medications    Details   amoxicillin clavulanate 875-125 MG Oral Tab Take 1 tablet by mouth 2 (two) times daily for 7 days., Normal, Disp-14 tablet, R-0

## 2024-04-15 ENCOUNTER — OFFICE VISIT (OUTPATIENT)
Dept: SURGERY | Facility: CLINIC | Age: 54
End: 2024-04-15
Payer: COMMERCIAL

## 2024-04-15 VITALS
WEIGHT: 184 LBS | HEART RATE: 83 BPM | HEIGHT: 61 IN | BODY MASS INDEX: 34.74 KG/M2 | SYSTOLIC BLOOD PRESSURE: 113 MMHG | DIASTOLIC BLOOD PRESSURE: 78 MMHG

## 2024-04-15 DIAGNOSIS — Z48.89 ENCOUNTER FOR POSTOPERATIVE WOUND CHECK: ICD-10-CM

## 2024-04-15 DIAGNOSIS — Z98.890 POSTOPERATIVE STATE: Primary | ICD-10-CM

## 2024-04-15 DIAGNOSIS — Z98.890 S/P LUMBAR SPINE OPERATION: ICD-10-CM

## 2024-04-15 PROCEDURE — 3078F DIAST BP <80 MM HG: CPT | Performed by: STUDENT IN AN ORGANIZED HEALTH CARE EDUCATION/TRAINING PROGRAM

## 2024-04-15 PROCEDURE — 3008F BODY MASS INDEX DOCD: CPT | Performed by: STUDENT IN AN ORGANIZED HEALTH CARE EDUCATION/TRAINING PROGRAM

## 2024-04-15 PROCEDURE — 3074F SYST BP LT 130 MM HG: CPT | Performed by: STUDENT IN AN ORGANIZED HEALTH CARE EDUCATION/TRAINING PROGRAM

## 2024-04-15 PROCEDURE — 99024 POSTOP FOLLOW-UP VISIT: CPT | Performed by: STUDENT IN AN ORGANIZED HEALTH CARE EDUCATION/TRAINING PROGRAM

## 2024-04-15 NOTE — PATIENT INSTRUCTIONS
Refill policies:    Allow 2-3 business days for refills; controlled substances may take longer.  Contact your pharmacy at least 5 days prior to running out of medication and have them send an electronic request or submit request through the “request refill” option in your [employer] account.  Refills are not addressed on weekends; covering physicians do not authorize routine medications on weekends.  No narcotics or controlled substances are refilled after noon on Fridays or by on call physicians.  By law, narcotics must be electronically prescribed.  A 30 day supply with no refills is the maximum allowed.  If your prescription is due for a refill, you may be due for a follow up appointment.  To best provide you care, patients receiving routine medications need to be seen at least once a year.  Patients receiving narcotic/controlled substance medications need to be seen at least once every 3 months.  In the event that your preferred pharmacy does not have the requested medication in stock (e.g. Backordered), it is your responsibility to find another pharmacy that has the requested medication available.  We will gladly send a new prescription to that pharmacy at your request.    Scheduling Tests:    If your physician has ordered radiology tests such as MRI or CT scans, please contact Central Scheduling at [phone] right away to schedule the test.  Once scheduled, the [facility] Centralized Referral Team will work with your insurance carrier to obtain pre-certification or prior authorization.  Depending on your insurance carrier, approval may take 3-10 days.  It is highly recommended patients assure they have received an authorization before having a test performed.  If test is done without insurance authorization, patient may be responsible for the entire amount billed.      Precertification and Prior Authorizations:  If your physician has recommended that you have a procedure or additional testing performed the [facility]  Centralized Referral Team will contact your insurance carrier to obtain pre-certification or prior authorization.    You are strongly encouraged to contact your insurance carrier to verify that your procedure/test has been approved and is a COVERED benefit.  Although the [facility] Centralized Referral Team does its due diligence, the insurance carrier gives the disclaimer that \"Although the procedure is authorized, this does not guarantee payment.\"    Ultimately the patient is responsible for payment.   Thank you for your understanding in this matter.  Paperwork Completion:  If you require FMLA or disability paperwork for your recovery, please make sure to either drop it off or have it faxed to our office at [phone]. Be sure the form has your name and date of birth on it.  The form will be faxed to our Forms Department and they will complete it for you.  There is a 25$ fee for all forms that need to be filled out.  Please be aware there is a 10-14 day turnaround time.  You will need to sign a release of information (JANINE) form if your paperwork does not come with one.  You may call the Forms Department with any questions at [phone].  Their fax number is [phone].

## 2024-04-15 NOTE — PROGRESS NOTES
Patient is in to follow up - Post Op  Last office visit: 3/21/24  Last procedure: 4/2/24  Most recent imaging dated on: 1/29/24  Physical Therapy / Injections: Patient denies completing any recent Physical Therapy / Injections after surgery.   Numbness / Tingling: Patient denies numbness and tingling.   Pain Level: 0/10.

## 2024-04-17 NOTE — PROGRESS NOTES
Established Neurosurgery Patient    Patient: Beryl Miles  Medical Record Number: [id number]  YOB: 1970  PCP: Sanchez Corona MD    Reason for visit: 2-week postoperative visit    Visit completed with German interpretation via language line services.  ID #[id number]    HISTORY OF PRESENTING ILLNESS:  Beryl Miles is a pleasant 53 year old female who returns to the neurosurgery clinic today approximately 2 weeks s/p left minimally invasive L5-S1 laminoforaminotomy and partial medial facetectomy by Dr. Sampson on 4/2/2024.  The patient reports that she experienced some discomfort immediately postop, but has continued to improve.  Today, she denies any pain.  She notes complete resolution of her preoperative lower extremity symptoms.  No new neurologic complaints.  She is getting around well.  Denies any constitutional symptoms or symptoms concerning for sepsis.  She has not had any issues with her incision site.  Her  is with her today.    Past Medical History:    Esophageal reflux    High cholesterol    Lumbar spondylosis    Migraines    PONV (postoperative nausea and vomiting)    Screen for colon cancer    repeat CLN in 10 years      Past Surgical History:   Procedure Laterality Date    Colonoscopy screening - referral N/A 7/5/2022    Procedure: COLONOSCOPY-SCREENING;  Surgeon: CB Gann MD;  Location: [facility] ENDOSCOPY    Hysterectomy  2017      Family History   Problem Relation Age of Onset    No Known Problems Father     No Known Problems Mother     Breast Cancer Neg       Social History     Socioeconomic History    Marital status:    Tobacco Use    Smoking status: Never    Smokeless tobacco: Never   Vaping Use    Vaping status: Never Used   Substance and Sexual Activity    Alcohol use: Never    Drug use: Never   Other Topics Concern    Caffeine Concern Yes     Comment: coffee daily    Exercise Yes     Comment: cardio      No Known Allergies   Current  Medications:  Current Outpatient Medications   Medication Sig Dispense Refill    amoxicillin clavulanate 875-125 MG Oral Tab Take 1 tablet by mouth 2 (two) times daily for 7 days. 14 tablet 0    acetaminophen 500 MG Oral Tab Take 1 tablet (500 mg total) by mouth every 4 (four) hours as needed. 120 tablet 0    oxyCODONE 5 MG Oral Tab Take 1 tablet (5 mg total) by mouth every 4 (four) hours as needed. 30 tablet 0    methocarbamol 500 MG Oral Tab Take 1 tablet (500 mg total) by mouth 3 (three) times daily as needed. 60 tablet 0    Naloxone HCl 4 MG/0.1ML Nasal Liquid 4 mg by Nasal route as needed. If patient remains unresponsive, repeat dose in other nostril 2-5 minutes after first dose. 1 kit 0    omeprazole 20 MG Oral Capsule Delayed Release Take 1 capsule (20 mg total) by mouth daily. (Patient taking differently: Take 1 capsule (20 mg total) by mouth every morning.) 90 capsule 1    rosuvastatin (CRESTOR) 40 MG Oral Tab Take 1 tablet (40 mg total) by mouth nightly. 90 tablet 1        REVIEW OF SYSTEMS:  Comprehensive review of systems completed and negative with the exception of aforementioned information in the HPI.     PHYSICAL EXAM:  /78   Pulse 83   Ht 61\"   Wt 184 lb (83.5 kg)   BMI 34.77 kg/m²   Body mass index is 34.77 kg/m².  Wt Readings from Last 6 Encounters:   04/15/24 184 lb (83.5 kg)   04/02/24 182 lb (82.6 kg)   03/18/24 184 lb (83.5 kg)   02/01/24 181 lb (82.1 kg)   01/29/24 181 lb (82.1 kg)   01/17/24 186 lb 12.8 oz (84.7 kg)        General: Well developed, well nourished, in no acute distress. Ambulates without assistance.    Incision: Clean, dry, and intact.  Dermabond noted.  No erythema, warmth, swelling, gross drainage appreciated.    HEENT: Normocephalic, atraumatic.    Respirations: Non-labored     Neurologic / Musculoskeletal: Awake, alert, and interactive. Recent and remote memory appear intact. Attention span and concentration are appropriate. No dysarthria. Appropriately names  objects. Coordination and motor control grossly intact. Patient follows commands briskly and appropriately.     Lumbar Spine:    Motor / Extremities   Hip   flexion Knee   extension Dorsiflexion Plantarflexion   Sensation   R 5/5 5/5 5/5 5/5 Intact to light touch   L 5/5 5/5 5/5 5/5 Intact to light touch       IMAGING:  No neurosurgical imaging to review today      ASSESSMENT / PLAN:    ICD-10-CM   1. Postoperative state  Z98.890      2. Encounter for postoperative wound check  Z48.89      3. S/P lumbar spine operation  Z98.890          Beryl Miles returns to the clinic today approximately 2 weeks s/p left minimally invasive L5-S1 laminoforaminotomy and medial partial facetectomy by Dr. Sampson on 4/2/2024.  The patient is doing really well from a neurosurgical standpoint.  She has complete resolution of her preoperative symptoms.  She is neurologically intact on examination.  Her incision is healing well.  No signs/symptoms concerning for infection, locally or systemically.  She inquired about whether or not she needs physical therapy.  I advised her that if she is interested in participating in physical therapy, that she should reach out to our office in 2 weeks so that she will be 1 month postop.  I explained that often times after this kind of surgery, patient's may not need physical therapy pending the progress.  Given her improvement in symptoms and current functional status, she may not need therapy.  I encouraged her to reach out, though, if she is interested in participating in therapy if she feels like she could benefit from it.     -Medications Prescribed: None  -Imaging Ordered: None  -Referrals Placed: None  -Follow up: 5/13/2024 at 1445    Plan was reviewed and discussed in detail with the patient. Patient encouraged to call the office with any questions or concerns of new/worsening neurologic symptoms. Patient demonstrated good understanding and was agreeable with the plan.     Visit time:  25 minutes   Over 50% of that time was spent providing patient education and discussing care plan.    Aron Herrera PA-C  Physician Assistant- Neurosurgery   [facility]  4/17/2024, 8:19 AM

## 2024-04-23 ENCOUNTER — HOSPITAL ENCOUNTER (OUTPATIENT)
Age: 54
Discharge: HOME | End: 2024-04-23
Payer: COMMERCIAL

## 2024-04-23 VITALS
SYSTOLIC BLOOD PRESSURE: 130 MMHG | TEMPERATURE: 97 F | DIASTOLIC BLOOD PRESSURE: 81 MMHG | OXYGEN SATURATION: 99 % | RESPIRATION RATE: 20 BRPM | HEART RATE: 84 BPM

## 2024-04-23 DIAGNOSIS — H69.92 EUSTACHIAN TUBE DYSFUNCTION, LEFT: Primary | ICD-10-CM

## 2024-04-23 PROCEDURE — 99213 OFFICE O/P EST LOW 20 MIN: CPT | Performed by: PHYSICIAN ASSISTANT

## 2024-04-23 RX ORDER — FLUTICASONE PROPIONATE 50 MCG
2 SPRAY, SUSPENSION (ML) NASAL DAILY
Qty: 16 G | Refills: 0 | Status: SHIPPED | OUTPATIENT
Start: 2024-04-23 | End: 2024-05-23

## 2024-04-23 RX ORDER — CETIRIZINE HYDROCHLORIDE 10 MG/1
10 TABLET ORAL DAILY
Qty: 30 TABLET | Refills: 0 | Status: SHIPPED | OUTPATIENT
Start: 2024-04-23 | End: 2024-05-23

## 2024-04-23 RX ORDER — METHYLPREDNISOLONE 4 MG/1
TABLET ORAL
Qty: 1 EACH | Refills: 0 | Status: SHIPPED | OUTPATIENT
Start: 2024-04-23

## 2024-04-23 NOTE — ED PROVIDER NOTES
Patient Seen in: [facility]      History     Chief Complaint   Patient presents with    Ear Problem     Stated Complaint: Ear issue    Subjective:   HPI    Patient is a 53-year-old female hyperlipidemia, gastroesophageal reflux disease, migraine headache, degenerative disc disease with lumbar radiculopathy status post lumbar microdiscectomy, German-speaking, presenting to immediate care for evaluation of decreased hearing of left ear with associated sensation of left ear fullness and clogging sensation.  Occasional ringing.  Patient was seen on 4/10/2020 for for right ear fullness and pressure.  Was diagnosed with acute otitis media and prescribed 7-day course of oral antibiotics Augmentin.  Completed.  Symptoms.  Concern for infection.  She will be traveling out of country and would like to be evaluated for symptoms.  No fevers.  No URI symptoms.  No ear, swelling, redness, rash, ear drainage.    Objective:   Past Medical History:    Esophageal reflux    High cholesterol    Lumbar spondylosis    Migraines    PONV (postoperative nausea and vomiting)    Screen for colon cancer    repeat CLN in 10 years              Past Surgical History:   Procedure Laterality Date    Colonoscopy screening - referral N/A 7/5/2022    Procedure: COLONOSCOPY-SCREENING;  Surgeon: CB Gann MD;  Location: [facility] ENDOSCOPY    Hysterectomy  2017                Social History     Socioeconomic History    Marital status:    Tobacco Use    Smoking status: Never    Smokeless tobacco: Never   Vaping Use    Vaping status: Never Used   Substance and Sexual Activity    Alcohol use: Never    Drug use: Never   Other Topics Concern    Caffeine Concern Yes     Comment: coffee daily    Exercise Yes     Comment: cardio   Social History Narrative    The patient does not use an assistive device..      The patient does live in a home with stairs.                  Review of Systems   Constitutional:  Negative for fever.   HENT:   Negative for ear pain.         Left ear fullness/pressure   Allergic/Immunologic: Negative for immunocompromised state.   Neurological:  Negative for dizziness, light-headedness and headaches.   Psychiatric/Behavioral:  Negative for confusion.    All other systems reviewed and are negative.      Positive for stated complaint: Ear issue  Other systems are as noted in HPI.  Constitutional and vital signs reviewed.      All other systems reviewed and negative except as noted above.    Physical Exam     ED Triage Vitals [04/23/24 1440]   /81   Pulse 84   Resp 20   Temp 97.3 °F (36.3 °C)   Temp src Temporal   SpO2 99 %   O2 Device None (Room air)       Current:/81   Pulse 84   Temp 97.3 °F (36.3 °C) (Temporal)   Resp 20   SpO2 99%         Physical Exam  Vitals and nursing note reviewed.   Constitutional:       General: She is not in acute distress.     Appearance: Normal appearance. She is not ill-appearing, toxic-appearing or diaphoretic.   HENT:      Head: Normocephalic and atraumatic.      Ears:      Comments: Bilateral ear effusion without erythema or bulging TM.  Cone of light present.  Ear canal without swelling or redness or drainage     Nose:      Comments: Nasal turbinates, nasal congestion, postnasal drip     Mouth/Throat:      Mouth: Mucous membranes are moist.   Eyes:      Conjunctiva/sclera: Conjunctivae normal.   Cardiovascular:      Rate and Rhythm: Normal rate.      Pulses: Normal pulses.   Pulmonary:      Effort: Pulmonary effort is normal. No respiratory distress.   Musculoskeletal:         General: No deformity. Normal range of motion.   Neurological:      General: No focal deficit present.      Mental Status: She is alert and oriented to person, place, and time.      Gait: Gait normal.   Psychiatric:         Mood and Affect: Mood normal.         Behavior: Behavior normal.             ED Course   Labs Reviewed - No data to display              MDM     Differential diagnoses considered  included, but are not exclusive of: Otitis media, externa, foreign body, cerumen impaction, TM perforation, middle ear effusion, eustachian tube dysfunction, viral URI, allergies    Patient is a 53-year-old female, presenting to immediate care for evaluation of decreased hearing of left ear with associated clogging/fullness sensation of left ear.  Recently treated for otitis media of right ear with 7-day course of Augmentin on/10/2024.  She denies any fevers.  No URI symptoms.  No ear pain.  Exam notable for bilateral ear effusion without erythema or bulging TM.  No TM perforation.  Canals patent without cerumen impaction or foreign body.  Nontender external ear.  Exam and history consistent with eustachian tube dysfunction.  Will treat outpatient supportively.  Flonase nasal spray, oral histamine, medrol dose pack.  ENT follow-up.  Return precautions                               Medical Decision Making      Disposition and Plan     Clinical Impression:  1. Eustachian tube dysfunction, left         Disposition:  Discharge  4/23/2024  3:03 pm    Follow-up:  Aneudy Ríos MD  [address] [place] [id number] [phone]      ENT (Ear Nose and Throat ) Doctor, As needed          Medications Prescribed:  Current Discharge Medication List        START taking these medications    Details   methylPREDNISolone (MEDROL) 4 MG Oral Tablet Therapy Pack Dosepack: take as directed  Qty: 1 each, Refills: 0      cetirizine 10 MG Oral Tab Take 1 tablet (10 mg total) by mouth daily.  Qty: 30 tablet, Refills: 0      fluticasone propionate 50 MCG/ACT Nasal Suspension 2 sprays by Nasal route daily.  Qty: 16 g, Refills: 0

## 2024-04-23 NOTE — ED INITIAL ASSESSMENT (HPI)
Pt has decreased hearing in left ear since 2 nights ago, felling a \"pulsing\" sensation; denies fever, pain, or drainage; pt is [place]ib[place]ian speaking

## 2024-05-10 ENCOUNTER — APPOINTMENT (OUTPATIENT)
Dept: GENERAL RADIOLOGY | Facility: HOSPITAL | Age: 54
End: 2024-05-10
Attending: EMERGENCY MEDICINE

## 2024-05-10 ENCOUNTER — HOSPITAL ENCOUNTER (OUTPATIENT)
Age: 54
Discharge: ANOTHER HOSPITAL | End: 2024-05-10
Payer: COMMERCIAL

## 2024-05-10 ENCOUNTER — HOSPITAL ENCOUNTER (EMERGENCY)
Facility: HOSPITAL | Age: 54
Discharge: HOME | End: 2024-05-10
Attending: EMERGENCY MEDICINE

## 2024-05-10 VITALS
DIASTOLIC BLOOD PRESSURE: 71 MMHG | SYSTOLIC BLOOD PRESSURE: 125 MMHG | RESPIRATION RATE: 18 BRPM | OXYGEN SATURATION: 100 % | TEMPERATURE: 98 F | HEART RATE: 80 BPM

## 2024-05-10 VITALS
HEIGHT: 61 IN | SYSTOLIC BLOOD PRESSURE: 118 MMHG | OXYGEN SATURATION: 100 % | BODY MASS INDEX: 34.55 KG/M2 | RESPIRATION RATE: 15 BRPM | DIASTOLIC BLOOD PRESSURE: 71 MMHG | WEIGHT: 183 LBS | TEMPERATURE: 98 F | HEART RATE: 87 BPM

## 2024-05-10 DIAGNOSIS — R06.02 SOB (SHORTNESS OF BREATH): ICD-10-CM

## 2024-05-10 DIAGNOSIS — M79.89 BILATERAL HAND SWELLING: ICD-10-CM

## 2024-05-10 DIAGNOSIS — R74.01 TRANSAMINITIS: ICD-10-CM

## 2024-05-10 DIAGNOSIS — R60.0 LEG EDEMA: Primary | ICD-10-CM

## 2024-05-10 DIAGNOSIS — R60.0 PERIPHERAL EDEMA: Primary | ICD-10-CM

## 2024-05-10 LAB
ALBUMIN SERPL-MCNC: 4 G/DL (ref 3.2–4.8)
ALP LIVER SERPL-CCNC: 104 U/L
ALT SERPL-CCNC: 77 U/L
ANION GAP SERPL CALC-SCNC: 6 MMOL/L (ref 0–18)
AST SERPL-CCNC: 59 U/L (ref ?–34)
BASOPHILS # BLD AUTO: 0.03 X10(3) UL (ref 0–0.2)
BASOPHILS NFR BLD AUTO: 0.4 %
BILIRUB DIRECT SERPL-MCNC: <0.1 MG/DL (ref ?–0.3)
BILIRUB SERPL-MCNC: 0.3 MG/DL (ref 0.3–1.2)
BILIRUB UR QL: NEGATIVE
BNP SERPL-MCNC: 9 PG/ML
BUN BLD-MCNC: 10 MG/DL (ref 9–23)
BUN/CREAT SERPL: 18.5 (ref 10–20)
CALCIUM BLD-MCNC: 8.5 MG/DL (ref 8.7–10.4)
CHLORIDE SERPL-SCNC: 111 MMOL/L (ref 98–112)
CLARITY UR: CLEAR
CO2 SERPL-SCNC: 28 MMOL/L (ref 21–32)
COLOR UR: YELLOW
CREAT BLD-MCNC: 0.54 MG/DL
D DIMER PPP FEU-MCNC: 0.32 UG/ML FEU (ref ?–0.51)
DEPRECATED RDW RBC AUTO: 41.9 FL (ref 35.1–46.3)
EGFRCR SERPLBLD CKD-EPI 2021: 111 ML/MIN/1.73M2 (ref 60–?)
EOSINOPHIL # BLD AUTO: 0.15 X10(3) UL (ref 0–0.7)
EOSINOPHIL NFR BLD AUTO: 2 %
ERYTHROCYTE [DISTWIDTH] IN BLOOD BY AUTOMATED COUNT: 13.7 % (ref 11–15)
GLUCOSE BLD-MCNC: 105 MG/DL (ref 70–99)
GLUCOSE UR-MCNC: NORMAL MG/DL
HCT VFR BLD AUTO: 35.1 %
HGB BLD-MCNC: 11.8 G/DL
IMM GRANULOCYTES # BLD AUTO: 0.03 X10(3) UL (ref 0–1)
IMM GRANULOCYTES NFR BLD: 0.4 %
KETONES UR-MCNC: NEGATIVE MG/DL
LEUKOCYTE ESTERASE UR QL STRIP.AUTO: NEGATIVE
LIPASE SERPL-CCNC: 35 U/L (ref 13–75)
LYMPHOCYTES # BLD AUTO: 2 X10(3) UL (ref 1–4)
LYMPHOCYTES NFR BLD AUTO: 26.1 %
MCH RBC QN AUTO: 28.2 PG (ref 26–34)
MCHC RBC AUTO-ENTMCNC: 33.6 G/DL (ref 31–37)
MCV RBC AUTO: 84 FL
MONOCYTES # BLD AUTO: 0.71 X10(3) UL (ref 0.1–1)
MONOCYTES NFR BLD AUTO: 9.3 %
NEUTROPHILS # BLD AUTO: 4.75 X10 (3) UL (ref 1.5–7.7)
NEUTROPHILS # BLD AUTO: 4.75 X10(3) UL (ref 1.5–7.7)
NEUTROPHILS NFR BLD AUTO: 61.8 %
NITRITE UR QL STRIP.AUTO: NEGATIVE
OSMOLALITY SERPL CALC.SUM OF ELEC: 299 MOSM/KG (ref 275–295)
PH UR: 6 [PH] (ref 5–8)
PLATELET # BLD AUTO: 246 10(3)UL (ref 150–450)
POTASSIUM SERPL-SCNC: 3.6 MMOL/L (ref 3.5–5.1)
PROT SERPL-MCNC: 6.5 G/DL (ref 5.7–8.2)
RBC # BLD AUTO: 4.18 X10(6)UL
RBC #/AREA URNS AUTO: >10 /HPF
SODIUM SERPL-SCNC: 145 MMOL/L (ref 136–145)
SP GR UR STRIP: 1.02 (ref 1–1.03)
TROPONIN I SERPL HS-MCNC: <3 NG/L
TSI SER-ACNC: 1.29 MIU/ML (ref 0.55–4.78)
UROBILINOGEN UR STRIP-ACNC: NORMAL
WBC # BLD AUTO: 7.7 X10(3) UL (ref 4–11)

## 2024-05-10 PROCEDURE — 71045 X-RAY EXAM CHEST 1 VIEW: CPT | Performed by: EMERGENCY MEDICINE

## 2024-05-10 PROCEDURE — 99285 EMERGENCY DEPT VISIT HI MDM: CPT

## 2024-05-10 PROCEDURE — 85025 COMPLETE CBC W/AUTO DIFF WBC: CPT | Performed by: EMERGENCY MEDICINE

## 2024-05-10 PROCEDURE — 93010 ELECTROCARDIOGRAM REPORT: CPT

## 2024-05-10 PROCEDURE — 85379 FIBRIN DEGRADATION QUANT: CPT | Performed by: EMERGENCY MEDICINE

## 2024-05-10 PROCEDURE — 84443 ASSAY THYROID STIM HORMONE: CPT | Performed by: EMERGENCY MEDICINE

## 2024-05-10 PROCEDURE — 83690 ASSAY OF LIPASE: CPT | Performed by: EMERGENCY MEDICINE

## 2024-05-10 PROCEDURE — 80048 BASIC METABOLIC PNL TOTAL CA: CPT | Performed by: EMERGENCY MEDICINE

## 2024-05-10 PROCEDURE — 93005 ELECTROCARDIOGRAM TRACING: CPT

## 2024-05-10 PROCEDURE — 81001 URINALYSIS AUTO W/SCOPE: CPT | Performed by: EMERGENCY MEDICINE

## 2024-05-10 PROCEDURE — 84484 ASSAY OF TROPONIN QUANT: CPT | Performed by: EMERGENCY MEDICINE

## 2024-05-10 PROCEDURE — 80076 HEPATIC FUNCTION PANEL: CPT | Performed by: EMERGENCY MEDICINE

## 2024-05-10 PROCEDURE — 36415 COLL VENOUS BLD VENIPUNCTURE: CPT

## 2024-05-10 PROCEDURE — 83880 ASSAY OF NATRIURETIC PEPTIDE: CPT | Performed by: EMERGENCY MEDICINE

## 2024-05-10 PROCEDURE — 99284 EMERGENCY DEPT VISIT MOD MDM: CPT

## 2024-05-10 NOTE — ED PROVIDER NOTES
Patient Seen in: [facility] Emergency Department      History     Chief Complaint   Patient presents with    Swelling Edema     Stated Complaint: Bilaterl leg swelling, SOB    Subjective:   54 y/o female with history of GERD, hyperlipidemia, migraines, lumbar surgery a year ago here with  who helps translate French to English for chief complaint of bilateral lower extremity edema for 1 week.  This has happened before but they thought it was from steroids.  She was on some steroids about a week ago for some type of ear problem.  She said there was even swelling last week in her upper body as well but that is gone.  She was taking some diclofenac that she had for her swelling.  Denies weakness or numbness.  No incontinence.  No urinary symptoms.  No nausea vomiting or diarrhea.  No cough or congestion.  No chest pain or shortness of breath.  No rash.  No trauma or travel.  No history of DVT.            Objective:   Past Medical History:    Esophageal reflux    High cholesterol    Lumbar spondylosis    Migraines    PONV (postoperative nausea and vomiting)    Screen for colon cancer    repeat CLN in 10 years              Past Surgical History:   Procedure Laterality Date    Colonoscopy screening - referral N/A 7/5/2022    Procedure: COLONOSCOPY-SCREENING;  Surgeon: CB Gann MD;  Location: [facility] ENDOSCOPY    Hysterectomy  2017                Social History     Socioeconomic History    Marital status:    Tobacco Use    Smoking status: Never    Smokeless tobacco: Never   Vaping Use    Vaping status: Never Used   Substance and Sexual Activity    Alcohol use: Never    Drug use: Never   Other Topics Concern    Caffeine Concern Yes     Comment: coffee daily    Exercise Yes     Comment: cardio   Social History Narrative    The patient does not use an assistive device..      The patient does live in a home with stairs.                  Review of Systems    Positive for stated complaint: Bilaterl leg  swelling, SOB  Other systems are as noted in HPI.  Constitutional and vital signs reviewed.      All other systems reviewed and negative except as noted above.    Physical Exam     ED Triage Vitals [05/10/24 1826]   /87   Pulse 88   Resp 18   Temp 98 °F (36.7 °C)   Temp src Oral   SpO2 99 %   O2 Device None (Room air)       Current Vitals:   Vital Signs  BP: (!) 122/93  Pulse: 85  Resp: 19  Temp: 98 °F (36.7 °C)  Temp src: Oral  MAP (mmHg): (!) 103    Oxygen Therapy  SpO2: 100 %  O2 Device: None (Room air)            Physical Exam  HENT:      Head: Normocephalic.      Mouth/Throat:      Mouth: Mucous membranes are moist.      Pharynx: Oropharynx is clear.   Eyes:      Extraocular Movements: Extraocular movements intact.      Pupils: Pupils are equal, round, and reactive to light.   Cardiovascular:      Rate and Rhythm: Normal rate and regular rhythm.      Heart sounds: Normal heart sounds.   Pulmonary:      Effort: Pulmonary effort is normal.      Breath sounds: Normal breath sounds.   Abdominal:      Palpations: Abdomen is soft.      Tenderness: There is no abdominal tenderness.   Musculoskeletal:         General: Swelling present. No tenderness. Normal range of motion.      Cervical back: Normal range of motion.      Right lower leg: Edema present.      Left lower leg: Edema present.   Lymphadenopathy:      Cervical: No cervical adenopathy.   Skin:     General: Skin is warm.      Capillary Refill: Capillary refill takes less than 2 seconds.   Neurological:      General: No focal deficit present.      Mental Status: She is alert.               ED Course     Labs Reviewed   HEPATIC FUNCTION PANEL (7) - Abnormal; Notable for the following components:       Result Value    AST 59 (*)     ALT 77 (*)     All other components within normal limits   BASIC METABOLIC PANEL (8) - Abnormal; Notable for the following components:    Glucose 105 (*)     Creatinine 0.54 (*)     Calcium, Total 8.5 (*)     Calculated  Osmolality 299 (*)     All other components within normal limits   URINALYSIS WITH CULTURE REFLEX - Abnormal; Notable for the following components:    Blood Urine 2+ (*)     Protein Urine Trace (*)     RBC Urine >10 (*)     Squamous Epi. Cells Few (*)     All other components within normal limits   CBC W/ DIFFERENTIAL - Abnormal; Notable for the following components:    HGB 11.8 (*)     All other components within normal limits   LIPASE - Normal   BNP (B TYPE NATRIURETIC PEPTIDE) - Normal   TROPONIN I HIGH SENSITIVITY - Normal   D-DIMER - Normal   TSH W REFLEX TO FREE T4 - Normal   CBC WITH DIFFERENTIAL WITH PLATELET    Narrative:     The following orders were created for panel order CBC With Differential With Platelet.  Procedure                               Abnormality         Status                     ---------                               -----------         ------                     CBC W/ DIFFERENTIAL[[id number]]          Abnormal            Final result                 Please view results for these tests on the individual orders.          ED Course as of 05/10/24 2216  ------------------------------------------------------------  Time: 05/10 1953  Comment: Labs independently turbid by me.  BMP unremarkable other than a slightly low calcium.  Troponin normal, TSH normal, lipase normal, BNP normal, CBC shows mild anemia 11.8, hepatic profile shows mild transaminitis.  Liver enzymes were not elevated 1 month ago when I compare to.  ------------------------------------------------------------  Time: 05/10 1954  Comment: Chest x-ray independently interpreted by me as no acute finding  ------------------------------------------------------------  Time: 05/10 2043  Comment:   Resources are being given.  ------------------------------------------------------------  Time: 05/10 2044  Comment: Labs further independently interpreted by me as well as urine and chest x-ray.  Chest x-ray clear.  Urine does show some  RBCs.  D-dimer is normal no suspicion for bilateral DVTs.  Does not seem to be heart failure.  She has had leg edema before.  Unclear what is causing her transaminitis or if it is even related to her current complaint.  Discussed with patient and .  ------------------------------------------------------------  Time: 05/10 2215  Comment: Patient does endorse being on some steroids within the last couple weeks for an ear issue so certainly could be the cause of her edema.              MDM      XR CHEST AP PORTABLE  (CPT=71045)    Result Date: 5/10/2024  CONCLUSION: No acute cardiopulmonary abnormality.   Dictated by (CST): Ok Gonzales MD on 5/10/2024 at 7:21 PM     Finalized by (CST): Ok Gonzales MD on 5/10/2024 at 7:21 PM                                            Medical Decision Making  53-year-old female who appears comfortable here presenting with lower extremity edema for 1 week.  No significant change today she just did not know why it did not go away.  About 5 days ago she may have had some swelling in her upper body as well she is telling us.  No shortness of breath or chest pain.  No pain at this time.  No vomiting or diarrhea.  No decreased urine output.  She has been taking diclofenac.  Differential is vast but could include DVT, CHF, poor venous return, inflammation, renal failure or other kidney conditions.  Will perform lab work including D-dimer TSH and EKG and BNP for further assessment.    Amount and/or Complexity of Data Reviewed  External Data Reviewed: notes.     Details: Epidural over a month ago.  Labs: ordered. Decision-making details documented in ED Course.  Radiology: ordered and independent interpretation performed. Decision-making details documented in ED Course.    Risk  OTC drugs.        Disposition and Plan     Clinical Impression:  1. Leg edema    2. Transaminitis         Disposition:  Discharge  5/10/2024 10:15 pm    Follow-up:  Sanchez Corona  [address]  [place]  [address]  [facility] [address]  [phone]    Follow up in 2 day(s)            Medications Prescribed:  Current Discharge Medication List

## 2024-05-10 NOTE — ED INITIAL ASSESSMENT (HPI)
Pt states last Friday having body aches that felt like her whole body was stiff. Pt states was placed on steroids recently. Pt states now also having bilateral leg swelling. Pt states when leaning forward felt SOB. Pt states yesterday having tingling in hands for about 6 hours and then went away. Pt states now feels like hand are swollen.

## 2024-05-10 NOTE — ED PROVIDER NOTES
Patient Seen in: [facility]      History   No chief complaint on file.    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 Has had intermittent sob and pain when taking in a deep breath \"it hurts when my breast are swollen\". Yesterday had tingling to her hands, which lasted for 6 hours. Hands feel more swollen since. Patient endorses was on steroid 04/23/2024 due to ear pain. Had sciatica surgery and steroid injection on 04/02/2024. Took Diclofenac this morning for pain. No fever/chills, cp, ORTIZ, injury, headache             Objective:   Past Medical History:    Esophageal reflux    High cholesterol    Lumbar spondylosis    Migraines    PONV (postoperative nausea and vomiting)    Screen for colon cancer    repeat CLN in 10 years              Past Surgical History:   Procedure Laterality Date    Colonoscopy screening - referral N/A 7/5/2022    Procedure: COLONOSCOPY-SCREENING;  Surgeon: CB Gann MD;  Location: [facility] ENDOSCOPY    Hysterectomy  2017                Social History     Socioeconomic History    Marital status:    Tobacco Use    Smoking status: Never    Smokeless tobacco: Never   Vaping Use    Vaping status: Never Used   Substance and Sexual Activity    Alcohol use: Never    Drug use: Never   Other Topics Concern    Caffeine Concern Yes     Comment: coffee daily    Exercise Yes     Comment: cardio   Social History Narrative    The patient does not use an assistive device..      The patient does live in a home with stairs.                  Review of Systems   Constitutional:  Negative for chills and fever.   HENT:  Negative for congestion, facial swelling, rhinorrhea, sore  throat and trouble swallowing.    Respiratory:  Positive for chest tightness and shortness of breath. Negative for cough.    Cardiovascular:  Positive for leg swelling. Negative for chest pain and palpitations.   Gastrointestinal:  Negative for abdominal pain, diarrhea, nausea and vomiting.   Musculoskeletal:  Positive for myalgias and neck pain.   Neurological:  Positive for weakness and numbness. Negative for dizziness, light-headedness and headaches.   All other systems reviewed and are negative.      Positive for stated complaint: Leg swelling, bodyaches  Other systems are as noted in HPI.  Constitutional and vital signs reviewed.      All other systems reviewed and negative except as noted above.    Physical Exam     ED Triage Vitals [05/10/24 1654]   /71   Pulse 80   Resp 18   Temp 98.2 °F (36.8 °C)   Temp src Temporal   SpO2 100 %   O2 Device None (Room air)       Current Vitals:   Vital Signs  BP: 125/71  Pulse: 80  Resp: 18  Temp: 98.2 °F (36.8 °C)  Temp src: Temporal    Oxygen Therapy  SpO2: 100 %  O2 Device: None (Room air)            Physical Exam  Vitals and nursing note reviewed.   Constitutional:       Appearance: Normal appearance.   HENT:      Head: Normocephalic.      Right Ear: Tympanic membrane and external ear normal.      Left Ear: Tympanic membrane and external ear normal.      Mouth/Throat:      Mouth: Mucous membranes are moist.   Neck:      Vascular: No carotid bruit.   Cardiovascular:      Rate and Rhythm: Normal rate and regular rhythm.   Pulmonary:      Effort: Pulmonary effort is normal.      Breath sounds: Normal breath sounds. No rales.   Chest:      Chest wall: No swelling or tenderness.   Musculoskeletal:         General: Normal range of motion.      Right hand: Swelling present.      Left hand: Swelling present.      Cervical back: Normal range of motion and neck supple. No spinous process tenderness or muscular tenderness.      Right lower leg: No bony tenderness. 1+ Edema  present.      Left lower leg: No bony tenderness. 1+ Edema present.      Right ankle: Swelling present.      Left ankle: Swelling present.      Right foot: Swelling present.      Left foot: Swelling present.   Lymphadenopathy:      Cervical: No cervical adenopathy.   Skin:     General: Skin is warm.      Capillary Refill: Capillary refill takes less than 2 seconds.   Neurological:      General: No focal deficit present.      Mental Status: She is alert and oriented to person, place, and time.      GCS: GCS eye subscore is 4. GCS verbal subscore is 5. GCS motor subscore is 6.   Psychiatric:         Behavior: Behavior is cooperative.               ED Course   Labs Reviewed - No data to display                   MDM                                         Medical Decision Making  Patient is non ill/toxic appearing. Resp easy non labored  I discussed differentials with patient including but not limited to allergic reaction vs CHF vs DVT vs venous insufficiency vs renal failure vs lymphedema  Discussed with patient and  need for further evaluation and testing in Emergency Department due to limited resources in IC   will drive patient to [place] [facility]. Stable for discharge  Discussed with Dr. Veloz    Problems Addressed:  Bilateral hand swelling: acute illness or injury  Peripheral edema: acute illness or injury  SOB (shortness of breath): acute illness or injury        Disposition and Plan     Clinical Impression:  1. Peripheral edema    2. Bilateral hand swelling    3. SOB (shortness of breath)         Disposition:  Ic to ed  5/10/2024  5:16 pm    Follow-up:  No follow-up provider specified.        Medications Prescribed:  Discharge Medication List as of 5/10/2024  5:25 PM

## 2024-05-10 NOTE — ED INITIAL ASSESSMENT (HPI)
Pt presents stating that her legs have been swollen since last Friday.  She states that she has had this problem in the past and been seen by the doctor but they didn't tell her anything.  Pt reports a history of sciatica and that she had surgery with a steroid injection for that in April.

## 2024-05-11 LAB
ATRIAL RATE: 80 BPM
P AXIS: 22 DEGREES
P-R INTERVAL: 172 MS
Q-T INTERVAL: 402 MS
QRS DURATION: 78 MS
QTC CALCULATION (BEZET): 463 MS
R AXIS: -14 DEGREES
T AXIS: 9 DEGREES
VENTRICULAR RATE: 80 BPM

## 2024-05-11 NOTE — ED QUICK NOTES
Bedside report received and patient awaiting results for plan of care. Patient calm and content connected to cardiac monitor.

## 2024-05-20 ENCOUNTER — OFFICE VISIT (OUTPATIENT)
Dept: SURGERY | Facility: CLINIC | Age: 54
End: 2024-05-20

## 2024-05-20 VITALS
WEIGHT: 180 LBS | HEIGHT: 61 IN | DIASTOLIC BLOOD PRESSURE: 86 MMHG | SYSTOLIC BLOOD PRESSURE: 121 MMHG | HEART RATE: 80 BPM | BODY MASS INDEX: 33.99 KG/M2

## 2024-05-20 DIAGNOSIS — Z48.89 ENCOUNTER FOR POSTOPERATIVE WOUND CHECK: ICD-10-CM

## 2024-05-20 DIAGNOSIS — M54.16 LUMBAR RADICULITIS: ICD-10-CM

## 2024-05-20 DIAGNOSIS — Z98.890 S/P LUMBAR SPINE OPERATION: ICD-10-CM

## 2024-05-20 DIAGNOSIS — Z98.890 POSTOPERATIVE STATE: Primary | ICD-10-CM

## 2024-05-20 PROCEDURE — 3079F DIAST BP 80-89 MM HG: CPT | Performed by: STUDENT IN AN ORGANIZED HEALTH CARE EDUCATION/TRAINING PROGRAM

## 2024-05-20 PROCEDURE — 3074F SYST BP LT 130 MM HG: CPT | Performed by: STUDENT IN AN ORGANIZED HEALTH CARE EDUCATION/TRAINING PROGRAM

## 2024-05-20 PROCEDURE — 99024 POSTOP FOLLOW-UP VISIT: CPT | Performed by: STUDENT IN AN ORGANIZED HEALTH CARE EDUCATION/TRAINING PROGRAM

## 2024-05-20 PROCEDURE — 3008F BODY MASS INDEX DOCD: CPT | Performed by: STUDENT IN AN ORGANIZED HEALTH CARE EDUCATION/TRAINING PROGRAM

## 2024-05-20 NOTE — PROGRESS NOTES
Established Neurosurgery Patient    Patient: Beryl Miles  Medical Record Number: [id number]  YOB: 1970  PCP: Sanchez Corona MD  Visit completed with Brazilian interpretation via language line services over the telephone.  ID #[id number],   Reason for visit: 4-week postoperative visit    HISTORY OF PRESENTING ILLNESS:  Beryl Miles is a pleasant 53 year old female who returns to the neurosurgery clinic today approximately 4 weeks s/p left minimally invasive L5-S1 laminoforaminotomy and medial facetectomy by Dr. Sampson on 4/2/2024.  Overall, the patient is doing well.  She will experience intermittent left buttock and calf pain, similar to the distribution of her symptoms prior to surgery.  This pain, however, is less in severity than it was preop.  She takes Tylenol with good relief.  She has no new neurologic complaints.  No constitutional symptoms or symptoms concerning for sepsis.  She states that her incision is healing well.  She has been more active in her daily life.    Past Medical History:    Esophageal reflux    High cholesterol    Lumbar spondylosis    Migraines    PONV (postoperative nausea and vomiting)    Screen for colon cancer    repeat CLN in 10 years      Past Surgical History:   Procedure Laterality Date    Colonoscopy screening - referral N/A 7/5/2022    Procedure: COLONOSCOPY-SCREENING;  Surgeon: CB Gann MD;  Location: [facility] ENDOSCOPY    Hysterectomy  2017      Family History   Problem Relation Age of Onset    No Known Problems Father     No Known Problems Mother     Breast Cancer Neg       Social History     Socioeconomic History    Marital status:    Tobacco Use    Smoking status: Never    Smokeless tobacco: Never   Vaping Use    Vaping status: Never Used   Substance and Sexual Activity    Alcohol use: Never    Drug use: Never   Other Topics Concern    Caffeine Concern Yes     Comment: coffee daily    Exercise Yes     Comment: cardio      No  Known Allergies   Current Medications:  Current Outpatient Medications   Medication Sig Dispense Refill    methylPREDNISolone (MEDROL) 4 MG Oral Tablet Therapy Pack Dosepack: take as directed (Patient not taking: Reported on 5/10/2024) 1 each 0    cetirizine 10 MG Oral Tab Take 1 tablet (10 mg total) by mouth daily. (Patient not taking: Reported on 5/10/2024) 30 tablet 0    fluticasone propionate 50 MCG/ACT Nasal Suspension 2 sprays by Nasal route daily. (Patient not taking: Reported on 5/10/2024) 16 g 0    acetaminophen 500 MG Oral Tab Take 1 tablet (500 mg total) by mouth every 4 (four) hours as needed. 120 tablet 0    oxyCODONE 5 MG Oral Tab Take 1 tablet (5 mg total) by mouth every 4 (four) hours as needed. (Patient not taking: Reported on 5/10/2024) 30 tablet 0    methocarbamol 500 MG Oral Tab Take 1 tablet (500 mg total) by mouth 3 (three) times daily as needed. (Patient not taking: Reported on 5/10/2024) 60 tablet 0    Naloxone HCl 4 MG/0.1ML Nasal Liquid 4 mg by Nasal route as needed. If patient remains unresponsive, repeat dose in other nostril 2-5 minutes after first dose. (Patient not taking: Reported on 5/10/2024) 1 kit 0    omeprazole 20 MG Oral Capsule Delayed Release Take 1 capsule (20 mg total) by mouth daily. (Patient taking differently: Take 1 capsule (20 mg total) by mouth every morning.) 90 capsule 1    rosuvastatin (CRESTOR) 40 MG Oral Tab Take 1 tablet (40 mg total) by mouth nightly. 90 tablet 1        REVIEW OF SYSTEMS:  Comprehensive review of systems completed and negative with the exception of aforementioned information in the HPI.     PHYSICAL EXAM:  /86 (BP Location: Left arm, Patient Position: Sitting, Cuff Size: adult)   Pulse 80   Ht 61\"   Wt 180 lb (81.6 kg)   BMI 34.01 kg/m²   Body mass index is 34.01 kg/m².  Wt Readings from Last 6 Encounters:   05/20/24 180 lb (81.6 kg)   05/10/24 183 lb (83 kg)   04/15/24 184 lb (83.5 kg)   04/02/24 182 lb (82.6 kg)   03/18/24 184 lb  (83.5 kg)   02/01/24 181 lb (82.1 kg)        General: Well developed, well nourished, in no acute distress. Ambulates without assistance    Incision: Open to air.  Healing well.  No erythema, warmth, swelling, gross drainage appreciated.    HEENT: Normocephalic, atraumatic.    Respirations: Non-labored     Neurologic / Musculoskeletal: Awake, alert, and interactive. Recent and remote memory appear intact. Attention span and concentration are appropriate. No dysarthria. Appropriately names objects. Coordination and motor control grossly intact. Patient follows commands briskly and appropriately.      Lumbar Spine:    Motor / Extremities   Hip   flexion Knee   extension Dorsiflexion Plantarflexion   Sensation   R 5/5 5/5 5/5 5/5 Intact to light touch   L 5/5 5/5 5/5 5/5 Intact to light touch       IMAGING:  No new neurosurgical imaging to review at this time      ASSESSMENT / PLAN:    ICD-10-CM   1. Postoperative state  Z98.890      2. Encounter for postoperative wound check  Z48.89      3. S/P lumbar spine operation  Z98.890      4. Lumbar radiculitis  M54.16          Beryl Miles returns to the clinic today approximately 4 weeks s/p left minimally invasive L5-S1 laminoforaminotomy and medial facetectomy by Dr. Sampson on 4/2/2024.  She experiences intermittent pain along the same distribution as her preoperative symptoms, but is well-managed with Tylenol.  She is neurologically intact on examination.  Her incision is healing well.  No signs/symptoms concerning for infection, locally or systemically.  Counseled her on her postoperative restrictions.    -Medications Prescribed: None  -Imaging Ordered: None  -Referrals Placed: None  -Follow up: With Dr. Sampson 6/27/2024 at 1600    Plan was reviewed and discussed in detail with the patient. Patient encouraged to call the office with any questions or concerns of new/worsening neurologic symptoms. Patient demonstrated good understanding and was agreeable with  the plan.     Visit time: 30 minutes   Over 50% of that time was spent providing patient education and discussing care plan.    Aron Herrera PA-C  Physician Assistant- Neurosurgery   [facility]  5/20/2024, 3:37 PM

## 2024-05-21 ENCOUNTER — LAB ENCOUNTER (OUTPATIENT)
Dept: LAB | Facility: HOSPITAL | Age: 54
End: 2024-05-21
Attending: INTERNAL MEDICINE

## 2024-05-21 ENCOUNTER — OFFICE VISIT (OUTPATIENT)
Dept: INTERNAL MEDICINE CLINIC | Facility: CLINIC | Age: 54
End: 2024-05-21

## 2024-05-21 VITALS
OXYGEN SATURATION: 98 % | HEART RATE: 79 BPM | SYSTOLIC BLOOD PRESSURE: 114 MMHG | BODY MASS INDEX: 35.05 KG/M2 | HEIGHT: 61 IN | WEIGHT: 185.63 LBS | DIASTOLIC BLOOD PRESSURE: 78 MMHG

## 2024-05-21 DIAGNOSIS — E78.5 HYPERLIPIDEMIA, UNSPECIFIED HYPERLIPIDEMIA TYPE: ICD-10-CM

## 2024-05-21 DIAGNOSIS — T78.40XS ALLERGIC REACTION, SEQUELA: ICD-10-CM

## 2024-05-21 DIAGNOSIS — H93.13 TINNITUS OF BOTH EARS: Primary | ICD-10-CM

## 2024-05-21 DIAGNOSIS — R23.2 HOT FLASHES: ICD-10-CM

## 2024-05-21 DIAGNOSIS — G43.901 MIGRAINE WITH STATUS MIGRAINOSUS, NOT INTRACTABLE, UNSPECIFIED MIGRAINE TYPE: ICD-10-CM

## 2024-05-21 DIAGNOSIS — H69.93 EUSTACHIAN TUBE DYSFUNCTION, BILATERAL: ICD-10-CM

## 2024-05-21 LAB
CHOLEST SERPL-MCNC: 133 MG/DL (ref ?–200)
FASTING PATIENT LIPID ANSWER: YES
HDLC SERPL-MCNC: 46 MG/DL (ref 40–59)
LDLC SERPL CALC-MCNC: 55 MG/DL (ref ?–100)
NONHDLC SERPL-MCNC: 87 MG/DL (ref ?–130)
TRIGL SERPL-MCNC: 196 MG/DL (ref 30–149)
VLDLC SERPL CALC-MCNC: 28 MG/DL (ref 0–30)

## 2024-05-21 PROCEDURE — 80061 LIPID PANEL: CPT | Performed by: INTERNAL MEDICINE

## 2024-05-21 PROCEDURE — 3008F BODY MASS INDEX DOCD: CPT | Performed by: INTERNAL MEDICINE

## 2024-05-21 PROCEDURE — 3074F SYST BP LT 130 MM HG: CPT | Performed by: INTERNAL MEDICINE

## 2024-05-21 PROCEDURE — 3078F DIAST BP <80 MM HG: CPT | Performed by: INTERNAL MEDICINE

## 2024-05-21 PROCEDURE — 99215 OFFICE O/P EST HI 40 MIN: CPT | Performed by: INTERNAL MEDICINE

## 2024-05-21 RX ORDER — AZELASTINE 1 MG/ML
1 SPRAY, METERED NASAL 2 TIMES DAILY
Qty: 1 EACH | Refills: 1 | Status: SHIPPED | OUTPATIENT
Start: 2024-05-21

## 2024-05-21 NOTE — PROGRESS NOTES
[facility]  Return Patient Progress Note      HPI:     Chief Complaint   Patient presents with    Ringing In Ear       Beryl Miles is a 53 year old female presenting for:Follow up.      Has a significant  has a past medical history of Esophageal reflux, High cholesterol, Lumbar spondylosis, Migraines, PONV (postoperative nausea and vomiting), and Screen for colon cancer (2022).    Here today for further evaluation of ringing of left ear.  Has been evaluated.  Completed treatment for otitis media.  Continues to have clogged sensation.     Reports overall body swelling.  She underwent Left minimally invasive lumbar 5 to sacral 1 laminoforaminotomy, partial medial facetectomy on 4/2/2024.  Reports feeling better however one month after suddenly had swelling and body aches.  She is concerned this may be due to reaction of medication given during surgery.      Patient reports that she also had similar reaction after L5-S1 interlaminar epidural steroid injection in Nov 2023.  Similar time frame, occurred about 1 month after procedure.        Labs:   CMP:  Lab Results   Component Value Date/Time     05/10/2024 06:56 PM    K 3.6 05/10/2024 06:56 PM     05/10/2024 06:56 PM    CO2 28.0 05/10/2024 06:56 PM    CREATSERUM 0.54 (L) 05/10/2024 06:56 PM    CA 8.5 (L) 05/10/2024 06:56 PM     (H) 05/10/2024 06:56 PM    TP 6.5 05/10/2024 06:56 PM    ALB 4.0 05/10/2024 06:56 PM    ALKPHO 104 05/10/2024 06:56 PM    AST 59 (H) 05/10/2024 06:56 PM    ALT 77 (H) 05/10/2024 06:56 PM    BILT 0.3 05/10/2024 06:56 PM    TSH 1.290 05/10/2024 06:56 PM    T4F 1.0 07/22/2021 08:42 AM        Hemoglobin A1C, Microalbumin  Lab Results   Component Value Date/Time    A1C 5.5 01/17/2024 04:51 PM        Lipid panel  Lab Results   Component Value Date/Time    CHOLEST 187 05/25/2023 11:26 AM    HDL 44 05/25/2023 11:26 AM    TRIG 301 (H) 05/25/2023 11:26 AM    LDL 93 05/25/2023 11:26 AM    NONHDLC 143 (H)  05/25/2023 11:26 AM        Medications:  Current Outpatient Medications   Medication Sig Dispense Refill    azelastine 0.1 % Nasal Solution 1 spray by Nasal route 2 (two) times daily. 1 each 1    acetaminophen 500 MG Oral Tab Take 1 tablet (500 mg total) by mouth every 4 (four) hours as needed. 120 tablet 0    omeprazole 20 MG Oral Capsule Delayed Release Take 1 capsule (20 mg total) by mouth daily. 90 capsule 1    rosuvastatin (CRESTOR) 40 MG Oral Tab Take 1 tablet (40 mg total) by mouth nightly. 90 tablet 1    methylPREDNISolone (MEDROL) 4 MG Oral Tablet Therapy Pack Dosepack: take as directed (Patient not taking: Reported on 5/10/2024) 1 each 0    cetirizine 10 MG Oral Tab Take 1 tablet (10 mg total) by mouth daily. (Patient not taking: Reported on 5/10/2024) 30 tablet 0    fluticasone propionate 50 MCG/ACT Nasal Suspension 2 sprays by Nasal route daily. (Patient not taking: Reported on 5/10/2024) 16 g 0    oxyCODONE 5 MG Oral Tab Take 1 tablet (5 mg total) by mouth every 4 (four) hours as needed. (Patient not taking: Reported on 5/10/2024) 30 tablet 0    methocarbamol 500 MG Oral Tab Take 1 tablet (500 mg total) by mouth 3 (three) times daily as needed. (Patient not taking: Reported on 5/10/2024) 60 tablet 0    Naloxone HCl 4 MG/0.1ML Nasal Liquid 4 mg by Nasal route as needed. If patient remains unresponsive, repeat dose in other nostril 2-5 minutes after first dose. (Patient not taking: Reported on 5/10/2024) 1 kit 0      PMH:  Past Medical History:    Esophageal reflux    High cholesterol    Lumbar spondylosis    Migraines    PONV (postoperative nausea and vomiting)    Screen for colon cancer    repeat CLN in 10 years         PSH:  Past Surgical History:   Procedure Laterality Date    Colonoscopy screening - referral N/A 7/5/2022    Procedure: COLONOSCOPY-SCREENING;  Surgeon: CB Gann MD;  Location: [facility] ENDOSCOPY    Hysterectomy  2017       Allergies:  No Known Allergies   Social History:  Social  History     Socioeconomic History    Marital status:    Tobacco Use    Smoking status: Never    Smokeless tobacco: Never   Vaping Use    Vaping status: Never Used   Substance and Sexual Activity    Alcohol use: Never    Drug use: Never   Other Topics Concern    Caffeine Concern Yes     Comment: coffee daily    Exercise Yes     Comment: cardio   Social History Narrative    The patient does not use an assistive device..      The patient does live in a home with stairs.          Family History:  Family History   Problem Relation Age of Onset    No Known Problems Father     No Known Problems Mother     Breast Cancer Neg               REVIEW OF SYSTEMS:   Review of Systems   Constitutional:  Negative for chills, fatigue, fever and unexpected weight change.   HENT:  Negative for congestion, ear pain, hearing loss, rhinorrhea, sinus pain and sore throat.    Eyes:  Negative for pain, redness and visual disturbance.   Respiratory:  Negative for apnea, cough, chest tightness, shortness of breath and wheezing.    Cardiovascular:  Positive for leg swelling. Negative for chest pain and palpitations.   Gastrointestinal:  Negative for abdominal distention, abdominal pain, blood in stool, constipation and nausea.   Endocrine: Negative for cold intolerance, heat intolerance and polyuria.   Genitourinary:  Negative for dysuria, hematuria and urgency.   Musculoskeletal:  Negative for arthralgias, back pain, gait problem, joint swelling, myalgias and neck pain.   Skin:  Negative for rash and wound.   Allergic/Immunologic: Negative for food allergies and immunocompromised state.   Neurological:  Negative for dizziness, seizures, facial asymmetry, speech difficulty, weakness, light-headedness, numbness and headaches.   Hematological:  Negative for adenopathy. Does not bruise/bleed easily.   Psychiatric/Behavioral:  Negative for behavioral problems, sleep disturbance and suicidal ideas. The patient is not nervous/anxious.              PHYSICAL EXAM:   /78   Pulse 79   Ht 5' 1\" (1.549 m)   Wt 185 lb 9.6 oz (84.2 kg)   SpO2 98%   BMI 35.07 kg/m²  Estimated body mass index is 35.07 kg/m² as calculated from the following:    Height as of this encounter: 5' 1\" (1.549 m).    Weight as of this encounter: 185 lb 9.6 oz (84.2 kg).     Wt Readings from Last 3 Encounters:   05/21/24 185 lb 9.6 oz (84.2 kg)   05/20/24 180 lb (81.6 kg)   05/10/24 183 lb (83 kg)       Physical Exam  Vitals reviewed.   Constitutional:       General: She is not in acute distress.     Appearance: She is well-developed.   HENT:      Head: Normocephalic and atraumatic.   Eyes:      Conjunctiva/sclera: Conjunctivae normal.      Pupils: Pupils are equal, round, and reactive to light.   Neck:      Thyroid: No thyromegaly.   Cardiovascular:      Rate and Rhythm: Normal rate and regular rhythm.      Heart sounds: Normal heart sounds, S1 normal and S2 normal. No murmur heard.     No friction rub. No gallop.   Pulmonary:      Effort: Pulmonary effort is normal. No respiratory distress.      Breath sounds: Normal breath sounds. No wheezing or rales.   Chest:      Chest wall: No tenderness.   Abdominal:      General: Bowel sounds are normal. There is no distension.      Palpations: Abdomen is soft. There is no mass.      Tenderness: There is no abdominal tenderness. There is no guarding or rebound.   Musculoskeletal:         General: No tenderness. Normal range of motion.      Cervical back: Normal range of motion.   Lymphadenopathy:      Cervical: No cervical adenopathy.   Skin:     General: Skin is warm.      Findings: No erythema or rash.   Neurological:      Mental Status: She is alert and oriented to person, place, and time.      Cranial Nerves: No cranial nerve deficit.      Deep Tendon Reflexes: Reflexes are normal and symmetric.   Psychiatric:         Behavior: Behavior normal.         Thought Content: Thought content normal.         Judgment: Judgment normal.                ASSESSMENT AND PLAN:   Patient is a 53 year old female who presents primarily presents for:    (H93.13) Tinnitus of both ears  (primary encounter diagnosis)  Trial of azelastine.  Recommended otc afrin.      (H69.93) Eustachian tube dysfunction, bilateral  Plan: ENT Referral - In Network        If no improvement of nasal spray recommend to see ENT.      (E78.5) Hyperlipidemia, unspecified hyperlipidemia type  Plan: Lipid Panel        She would like to have it checked.      (G43.901) Migraine with status migrainosus, not intractable, unspecified migraine type  Plan: She started taking a medication from [place] that is not on formulary [place] in the [place].  It appears to have similar medications to excedrin.  I recommend she purchase over the counter excedrin as needed.      If no relief  will consider MRI and proceed with neurology referral.       (R23.2) Hot Flashes  Discussed supportive treatment.  Offerred SSRI which she does not want to start right at this moment.      (T78.40XS)  Allergy  reaction  Unclear exact etiology to her swelling.  Does not correlate well with medications from surgery given time frame (occurring 30 days after procedure).  Will monitor.           Meds & Refills for this Visit:  Requested Prescriptions     Signed Prescriptions Disp Refills    azelastine 0.1 % Nasal Solution 1 each 1     Si spray by Nasal route 2 (two) times daily.       Orders Placed This Encounter   Procedures    Lipid Panel       Imaging & Consults:  ENT - INTERNAL          Return in about 3 months (around 2024) for Symptom monitoring.  Important follow up notes/labs for next visit      Patient indicates understanding of the above recommendations and agrees to the above plan.  Reasurrance and education provided. All questions answered.    Notified to call with any questions, complications, allergies, or worsening or changing symptoms as well as any side effects or complications from the  treatments .  Red flags/ ER precautions discussed.    If diagnostic labs or imaging ordered advised patient to contact my office for results  24-48 hours after completion    This note was dictated using dragon speech recognition transcription software.  Typographical and transcription errors may be present.  Please call if any questions.             Sanchez Corona MD  [facility]        [phone]

## 2024-06-27 ENCOUNTER — OFFICE VISIT (OUTPATIENT)
Dept: SURGERY | Facility: CLINIC | Age: 54
End: 2024-06-27

## 2024-06-27 ENCOUNTER — HOSPITAL ENCOUNTER (OUTPATIENT)
Dept: ULTRASOUND IMAGING | Facility: HOSPITAL | Age: 54
Discharge: HOME | End: 2024-06-27
Attending: STUDENT IN AN ORGANIZED HEALTH CARE EDUCATION/TRAINING PROGRAM
Payer: COMMERCIAL

## 2024-06-27 VITALS
HEIGHT: 61 IN | WEIGHT: 191 LBS | SYSTOLIC BLOOD PRESSURE: 117 MMHG | HEART RATE: 88 BPM | DIASTOLIC BLOOD PRESSURE: 82 MMHG | BODY MASS INDEX: 36.06 KG/M2

## 2024-06-27 DIAGNOSIS — R20.2 BILATERAL LEG PARESTHESIA: ICD-10-CM

## 2024-06-27 DIAGNOSIS — M47.816 LUMBAR SPONDYLOSIS: ICD-10-CM

## 2024-06-27 DIAGNOSIS — M54.16 LUMBAR RADICULITIS: ICD-10-CM

## 2024-06-27 DIAGNOSIS — M51.37 DDD (DEGENERATIVE DISC DISEASE), LUMBOSACRAL: ICD-10-CM

## 2024-06-27 DIAGNOSIS — Z98.890 POSTOPERATIVE STATE: ICD-10-CM

## 2024-06-27 DIAGNOSIS — Z98.890 S/P LUMBAR SPINE OPERATION: ICD-10-CM

## 2024-06-27 DIAGNOSIS — Z98.890 POSTOPERATIVE STATE: Primary | ICD-10-CM

## 2024-06-27 DIAGNOSIS — M48.061 LUMBAR FORAMINAL STENOSIS: ICD-10-CM

## 2024-06-27 DIAGNOSIS — M54.59 LUMBAR TRIGGER POINT SYNDROME: ICD-10-CM

## 2024-06-27 DIAGNOSIS — M54.16 LUMBAR RADICULOPATHY: ICD-10-CM

## 2024-06-27 DIAGNOSIS — M54.59 MECHANICAL LOW BACK PAIN: ICD-10-CM

## 2024-06-27 DIAGNOSIS — M51.36 BULGE OF LUMBAR DISC WITHOUT MYELOPATHY: ICD-10-CM

## 2024-06-27 DIAGNOSIS — M51.16 LUMBAR DISC HERNIATION WITH RADICULOPATHY: ICD-10-CM

## 2024-06-27 DIAGNOSIS — M79.89 LEG SWELLING: ICD-10-CM

## 2024-06-27 DIAGNOSIS — M47.816 FACET SYNDROME, LUMBAR: ICD-10-CM

## 2024-06-27 PROCEDURE — 3079F DIAST BP 80-89 MM HG: CPT | Performed by: STUDENT IN AN ORGANIZED HEALTH CARE EDUCATION/TRAINING PROGRAM

## 2024-06-27 PROCEDURE — 99024 POSTOP FOLLOW-UP VISIT: CPT | Performed by: STUDENT IN AN ORGANIZED HEALTH CARE EDUCATION/TRAINING PROGRAM

## 2024-06-27 PROCEDURE — 3074F SYST BP LT 130 MM HG: CPT | Performed by: STUDENT IN AN ORGANIZED HEALTH CARE EDUCATION/TRAINING PROGRAM

## 2024-06-27 PROCEDURE — 93970 EXTREMITY STUDY: CPT | Performed by: STUDENT IN AN ORGANIZED HEALTH CARE EDUCATION/TRAINING PROGRAM

## 2024-06-27 PROCEDURE — 3008F BODY MASS INDEX DOCD: CPT | Performed by: STUDENT IN AN ORGANIZED HEALTH CARE EDUCATION/TRAINING PROGRAM

## 2024-06-27 NOTE — PATIENT INSTRUCTIONS
Refill policies:    Allow 2-3 business days for refills; controlled substances may take longer.  Contact your pharmacy at least 5 days prior to running out of medication and have them send an electronic request or submit request through the “request refill” option in your [facility] account.  Refills are not addressed on weekends; covering physicians do not authorize routine medications on weekends.  No narcotics or controlled substances are refilled after noon on Fridays or by on call physicians.  By law, narcotics must be electronically prescribed.  A 30 day supply with no refills is the maximum allowed.  If your prescription is due for a refill, you may be due for a follow up appointment.  To best provide you care, patients receiving routine medications need to be seen at least once a year.  Patients receiving narcotic/controlled substance medications need to be seen at least once every 3 months.  In the event that your preferred pharmacy does not have the requested medication in stock (e.g. Backordered), it is your responsibility to find another pharmacy that has the requested medication available.  We will gladly send a new prescription to that pharmacy at your request.    Scheduling Tests:    If your physician has ordered radiology tests such as MRI or CT scans, please contact Central Scheduling at [phone] right away to schedule the test.  Once scheduled, the [facility] Centralized Referral Team will work with your insurance carrier to obtain pre-certification or prior authorization.  Depending on your insurance carrier, approval may take 3-10 days.  It is highly recommended patients assure they have received an authorization before having a test performed.  If test is done without insurance authorization, patient may be responsible for the entire amount billed.      Precertification and Prior Authorizations:  If your physician has recommended that you have a procedure or additional testing performed the [facility]  Centralized Referral Team will contact your insurance carrier to obtain pre-certification or prior authorization.    You are strongly encouraged to contact your insurance carrier to verify that your procedure/test has been approved and is a COVERED benefit.  Although the [facility] Centralized Referral Team does its due diligence, the insurance carrier gives the disclaimer that \"Although the procedure is authorized, this does not guarantee payment.\"    Ultimately the patient is responsible for payment.   Thank you for your understanding in this matter.  Paperwork Completion:  If you require FMLA or disability paperwork for your recovery, please make sure to either drop it off or have it faxed to our office at [phone]. Be sure the form has your name and date of birth on it.  The form will be faxed to our Forms Department and they will complete it for you.  There is a 25$ fee for all forms that need to be filled out.  Please be aware there is a 10-14 day turnaround time.  You will need to sign a release of information (JANINE) form if your paperwork does not come with one.  You may call the Forms Department with any questions at [phone].  Their fax number is [phone].

## 2024-06-27 NOTE — PROGRESS NOTES
[facility]  Neurological Surgery Post-Operative Patient Clinic Note    Beryl Miles  9/17/1970  [id number]  PCP: Sanchez Corona MD  Referring Provider: Alex Behar, MD     REASON FOR VISIT:  Low back pain with radiation    NEUROSURGICAL PROCEDURES TO DATE:  4/2/2024 - Left minimally invasive Lumbar 5 to Sacral 1 laminoforaminotomy, partial medial facetectomy    HISTORY OF PRESENT ILLNESS 1/8/2024:  Beryl Miles is a(n) 53 year old female with hyperlipidemia, GERD who is referred for evaluation of low back pain with radiation.  She reports atraumatic onset of low back pain radiating to the left leg approximately 3 years ago.  She has been dealing with this conservatively.  She describes her pain as intermittently radiating down the left lateral aspect of her thigh, calf, and lateral foot.  Any movement seems to exacerbate this.  Leaning forward certainly exacerbates this as well.  Approximately 1 year ago she had a left interlaminar epidural steroid injection by Dr. Behar which gave her great relief.  Most recently she had a right interlaminal epidural steroid injection that provided her with no relief.  She has had physical therapy in the past, with continuing pain.  She denies any balance difficulties, loss of dexterity, significant bilateral lower extremity weakness, or any red flags for myelopathy or cauda equina syndrome.      INTERVAL HISTORY 2/1/2024:  Beryl Miles returns to clinic today for continued spinal follow-up.  She was last seen on 1/8/2024.  She reports stable, persistent left-sided radiating pain.  It continues to affect her posterior thigh, calf, and plantar aspect of her foot.  She has associated numbness and tingling of her foot.  This is exacerbated by standing, and activity.  She had flexion-extension x-rays as well as an MRI of the lumbar spine with sedation. She is here to review those results.     INTERVAL HISTORY  3/21/2024:  Beryl Miles returns to clinic today for preoperative discussions.  She continues to endorse left-sided radicular pain.  No new symptoms or red flags.  She is having her preoperative labs drawn today.    INTERVAL HISTORY 4/14/2024 (from Aron Herrera PA-C):  Beryl Miles is a pleasant 53 year old female who returns to the neurosurgery clinic today approximately 2 weeks s/p left minimally invasive L5-S1 laminoforaminotomy and partial medial facetectomy by Dr. Sampson on 4/2/2024.  The patient reports that she experienced some discomfort immediately postop, but has continued to improve.  Today, she denies any pain.  She notes complete resolution of her preoperative lower extremity symptoms.  No new neurologic complaints.  She is getting around well.  Denies any constitutional symptoms or symptoms concerning for sepsis.  She has not had any issues with her incision site.  Her  is with her today.     INTERVAL HISTORY 5/20/2024 (from Aron Herrera PA-C):  Beryl Miles is a pleasant 53 year old female who returns to the neurosurgery clinic today approximately 4 weeks s/p left minimally invasive L5-S1 laminoforaminotomy and medial facetectomy by Dr. Sampson on 4/2/2024.  Overall, the patient is doing well.  She will experience intermittent left buttock and calf pain, similar to the distribution of her symptoms prior to surgery.  This pain, however, is less in severity than it was preop.  She takes Tylenol with good relief.  She has no new neurologic complaints.  No constitutional symptoms or symptoms concerning for sepsis.  She states that her incision is healing well.  She has been more active in her daily life.    INTERVAL HISTORY 6/27/2024:  Beryl Miles returns to clinic today for 12-week postop follow-up.  Radiculopathies much improved with the exception of 1 she overdoes activity, she has some lingering pains and paresthesias.  This is not comparable to her  preop pain.  However, as of late she is having a myriad of other medical issues.  She reports swelling of the bilateral lower extremities, left greater than right starting around April.  She normally had a history of surgery but also to travel to [place] where she was on a plane for about 4 hours.  She was evaluated by her primary who prescribed her diuretics for fluid overload.  She is now experiencing paresthesias in her lower extremities.    PAST MEDICAL HISTORY:  Past Medical History:    Esophageal reflux    High cholesterol    Lumbar spondylosis    Migraines    PONV (postoperative nausea and vomiting)    Screen for colon cancer    repeat CLN in 10 years     PAST SURGICAL HISTORY:  Past Surgical History:   Procedure Laterality Date    Colonoscopy screening - referral N/A 7/5/2022    Procedure: COLONOSCOPY-SCREENING;  Surgeon: CB Gann MD;  Location: [facility] ENDOSCOPY    Hysterectomy  2017     FAMILY HISTORY:  family history includes No Known Problems in her father and mother.    SOCIAL HISTORY:   reports that she has never smoked. She has never used smokeless tobacco. She reports that she does not drink alcohol and does not use drugs.    ALLERGIES:  No Known Allergies    MEDICATIONS:  Current Outpatient Medications on File Prior to Visit   Medication Sig Dispense Refill    azelastine 0.1 % Nasal Solution 1 spray by Nasal route 2 (two) times daily. 1 each 1    methylPREDNISolone (MEDROL) 4 MG Oral Tablet Therapy Pack Dosepack: take as directed (Patient not taking: Reported on 5/10/2024) 1 each 0    acetaminophen 500 MG Oral Tab Take 1 tablet (500 mg total) by mouth every 4 (four) hours as needed. 120 tablet 0    oxyCODONE 5 MG Oral Tab Take 1 tablet (5 mg total) by mouth every 4 (four) hours as needed. (Patient not taking: Reported on 5/10/2024) 30 tablet 0    methocarbamol 500 MG Oral Tab Take 1 tablet (500 mg total) by mouth 3 (three) times daily as needed. (Patient not taking: Reported on 5/10/2024) 60  tablet 0    Naloxone HCl 4 MG/0.1ML Nasal Liquid 4 mg by Nasal route as needed. If patient remains unresponsive, repeat dose in other nostril 2-5 minutes after first dose. (Patient not taking: Reported on 5/10/2024) 1 kit 0    omeprazole 20 MG Oral Capsule Delayed Release Take 1 capsule (20 mg total) by mouth daily. 90 capsule 1    rosuvastatin (CRESTOR) 40 MG Oral Tab Take 1 tablet (40 mg total) by mouth nightly. 90 tablet 1     No current facility-administered medications on file prior to visit.     REVIEW OF SYSTEMS:  All other systems were reviewed and were negative except for those previously mentioned in the HPI    PHYSICAL EXAMINATION:  General: No acute distress.  Respiratory: Non-labored respirations bilaterally. No audible wheezing  Cardiovascular: Extremities warm and well-perfused.  Abdomen: Soft, nontender, nondistended.   Musculoskeletal: Moves all extremities well, symmetrically.  Extremities: No edema.     NEUROLOGIC EXAMINATION:  Mental status: Alert and oriented x 3  Speech: Clear, fluent  Cranial nerves: PERRLA, EOMI, face symmetric, with normal strength and sensation, tongue and palate midline, SCM 5/5 bilaterally  Motor:                           RIGHT  Delt 5/5   Bic 5/5  Tri 5/5   HI 5/5    5/5  IP 5/5   Quad 5/5   Ham 5/5   AT 5/5   EHL 5/5 Sterling 5/5                          LEFT    Delt 5/5   Bic 5/5  Tri 5/5   HI 5/5    5/5  IP 5/5   Quad 5/5   Ham 5/5   AT 5/5   EHL 5/5 Sterling 5/5            No pronator drift  Tone: Normal  Atrophy/Fasciculations: None  Sensation: Normal to light touch, symmetric, no neglect  Cerebellar: Normal finger nose finger  Gait: Normal, nondistressed heel toe tandem gait      Reflexes: 2+ throughout, symmetric, no Lily's    IMAGING:  No new neurosurgical imaging for review     ASSESSMENT:  Ms. Miles is now status post MIS left L5-S1 decompression on 4/2/2024 for management of a lumbar radiculopathy.  The lumbar radiculopathy symptoms have improved to an  extent, with the exception of when she participates in heavy physical activity but this is vastly improved compared to preop.  Unfortunately, she is now dealing with bilateral lower extremity swelling that seems out of proportion.  She denies having had any ultrasounds of her lower extremities to rule out DVT given her recent surgery and trips to [place].  Therefore, we will be obtaining these in short order to rule out DVT.  I have instructed her that if DVTs are present she should present to the emergency room for expedited treatment.    Plan:  - RTC 3 months (6 months postop) as well as ultrasounds of the lower extremities are negative    Total Time    Post-Operative Patient Total Time       30  minutes.      Activities       Preparing to see the patient (chart/tests/imaging review).       Obtaining and/or reviewing separately obtained history.       Performing a medically appropriate examination and/or evaluation.       Counseling and educating the patient/family/caregiver.       Ordering medications, tests, or procedures.       Referring and communicating with other health care professionals (when not separately reported).       Documenting clincal information in the electronic or other health record.       Independently interpreting results (not separately reported).    Communicating results to the patient/family/caregiver.    Care coordination (not separately reported).

## 2024-06-27 NOTE — PROGRESS NOTES
Last procedure: 4/2/24  Last office visit: 5/20/24  Pain Level: 0/10.   Numbness / Tingling: Patient denies numbness and tingling.   Physical Therapy / Injections: Patient denies completing any recent Physical Therapy / Injections.

## 2024-07-05 ENCOUNTER — TELEPHONE (OUTPATIENT)
Dept: SURGERY | Facility: CLINIC | Age: 54
End: 2024-07-05

## 2024-07-05 NOTE — TELEPHONE ENCOUNTER
Spoke to patient and notified of normal ultrasound, no clots.  Advised patient that she should see PCP for swelling she is having. Patient acknowledged.

## 2024-08-15 ENCOUNTER — HOSPITAL ENCOUNTER (OUTPATIENT)
Age: 54
Discharge: HOME | End: 2024-08-15
Payer: COMMERCIAL

## 2024-08-15 VITALS
OXYGEN SATURATION: 100 % | TEMPERATURE: 98 F | SYSTOLIC BLOOD PRESSURE: 122 MMHG | RESPIRATION RATE: 20 BRPM | HEART RATE: 82 BPM | DIASTOLIC BLOOD PRESSURE: 79 MMHG

## 2024-08-15 DIAGNOSIS — H60.392 OTHER INFECTIVE ACUTE OTITIS EXTERNA OF LEFT EAR: Primary | ICD-10-CM

## 2024-08-15 PROCEDURE — 99213 OFFICE O/P EST LOW 20 MIN: CPT | Performed by: NURSE PRACTITIONER

## 2024-08-15 RX ORDER — OFLOXACIN 3 MG/ML
5 SOLUTION AURICULAR (OTIC) DAILY
Qty: 5 ML | Refills: 0 | Status: SHIPPED | OUTPATIENT
Start: 2024-08-15 | End: 2024-08-22

## 2024-08-15 NOTE — ED INITIAL ASSESSMENT (HPI)
Pt Romanian speaking , here with complaints of ringing to her left ear that started 1 day ago , pt stases hearing is muffled to left ear, pt denies any fevers

## 2024-08-15 NOTE — ED PROVIDER NOTES
Patient Seen in: [facility] [place]    Patient is Cymro only speaking.  All communication was facilitated by online .    History     Chief Complaint   Patient presents with    Ear Problem Pain     Stated Complaint: L ear pain    Subjective:   HPI  Patient is a 53-year-old female who presents to the [facility] care center with concern for diminished, muffled hearing from the left ear with ringing that has been persistent since yesterday.  She has had recurrent, similar symptoms for the last 5 months.  She has not been evaluated for this before today.  Denies recent illness; denies recent injury.          Objective:   Past Medical History:    Esophageal reflux    High cholesterol    Lumbar spondylosis    Migraines    PONV (postoperative nausea and vomiting)    Screen for colon cancer    repeat CLN in 10 years              Past Surgical History:   Procedure Laterality Date    Colonoscopy screening - referral N/A 7/5/2022    Procedure: COLONOSCOPY-SCREENING;  Surgeon: CB Gann MD;  Location: [facility] ENDOSCOPY    Hysterectomy  2017                Social History     Socioeconomic History    Marital status:    Tobacco Use    Smoking status: Never    Smokeless tobacco: Never   Vaping Use    Vaping status: Never Used   Substance and Sexual Activity    Alcohol use: Never    Drug use: Never   Other Topics Concern    Caffeine Concern Yes     Comment: coffee daily    Exercise Yes     Comment: cardio   Social History Narrative    The patient does not use an assistive device..      The patient does live in a home with stairs.                  Review of Systems   Constitutional:  Negative for activity change, appetite change and fever.   HENT:  Positive for hearing loss. Negative for congestion, ear discharge and sore throat.    Respiratory:  Negative for cough.    Skin:  Negative for rash.   Neurological:  Negative for weakness and numbness.       Positive for stated Chief Complaint: Ear Problem  Pain    Other systems are as noted in HPI.  Constitutional and vital signs reviewed.      All other systems reviewed and negative except as noted above.    Physical Exam     ED Triage Vitals [08/15/24 1154]   /79   Pulse 82   Resp 20   Temp 97.8 °F (36.6 °C)   Temp src Temporal   SpO2 100 %   O2 Device None (Room air)       Current Vitals:   Vital Signs  BP: 122/79  Pulse: 82  Resp: 20  Temp: 97.8 °F (36.6 °C)  Temp src: Temporal    Oxygen Therapy  SpO2: 100 %  O2 Device: None (Room air)            Physical Exam  Vitals and nursing note reviewed.   Constitutional:       General: She is not in acute distress.     Appearance: She is not ill-appearing.   HENT:      Ears:      Comments: There is moderate amount of cerumen in the right tympanic membrane (her unaffected side).    The left distal auditory canal is mostly occluded, it is difficult to fully distinguish the reason for this.     Nose: Nose normal.   Eyes:      Conjunctiva/sclera: Conjunctivae normal.   Pulmonary:      Effort: Pulmonary effort is normal. No respiratory distress.   Musculoskeletal:      Cervical back: Normal range of motion and neck supple.   Skin:     General: Skin is warm and dry.   Neurological:      Mental Status: She is alert and oriented to person, place, and time.   Psychiatric:         Behavior: Behavior normal.               ED Course   Labs Reviewed - No data to display  Patient was offered irrigation of the left ear as the right ear clearly has cerumen impaction.  She states that she does not have time for this procedure today as she has an appointment with a podiatrist and needs to leave here promptly.  Patient was informed that we will treat today with topical antibiotics to the ear as this is potentially a deep cute otitis externa.  She was informed that if she does not have marked improvement in the next couple of days, she should have close follow-up with otolaryngology (resource phone numbers provided).  She states  understanding and agrees with plan.               MDM                                         Medical Decision Making  Differential diagnoses considered include, but are not exclusive of: Acute otitis media, suppurative; acute otitis externa; acute otitis media, serous; ruptured tympanic membrane; mastoiditis.      Problems Addressed:  Other infective acute otitis externa of left ear: self-limited or minor problem    Risk  Prescription drug management.        Disposition and Plan     Clinical Impression:  1. Other infective acute otitis externa of left ear         Disposition:  Discharge  8/15/2024 12:50 pm    Follow-up:  Mark Malin DO  [address] [place] [id number]  [phone]    Schedule an appointment as soon as possible for a visit in 1 week  As needed          Medications Prescribed:  Discharge Medication List as of 8/15/2024 12:50 PM        START taking these medications    Details   ofloxacin 0.3 % Otic Solution Place 5 drops into the left ear daily for 7 days., Normal, Disp-5 mL, R-0

## 2024-08-26 ENCOUNTER — OFFICE VISIT (OUTPATIENT)
Dept: AUDIOLOGY | Facility: CLINIC | Age: 54
End: 2024-08-26

## 2024-08-26 ENCOUNTER — OFFICE VISIT (OUTPATIENT)
Dept: OTOLARYNGOLOGY | Facility: CLINIC | Age: 54
End: 2024-08-26
Payer: COMMERCIAL

## 2024-08-26 VITALS — BODY MASS INDEX: 35.12 KG/M2 | WEIGHT: 186 LBS | HEIGHT: 61 IN

## 2024-08-26 DIAGNOSIS — H93.12 RINGING IN EAR, LEFT: Primary | ICD-10-CM

## 2024-08-26 DIAGNOSIS — H93.12 TINNITUS, LEFT: ICD-10-CM

## 2024-08-26 DIAGNOSIS — H93.12 TINNITUS, LEFT: Primary | ICD-10-CM

## 2024-08-26 PROCEDURE — 92557 COMPREHENSIVE HEARING TEST: CPT | Performed by: AUDIOLOGIST

## 2024-08-26 PROCEDURE — 92567 TYMPANOMETRY: CPT | Performed by: AUDIOLOGIST

## 2024-08-26 PROCEDURE — 99203 OFFICE O/P NEW LOW 30 MIN: CPT | Performed by: STUDENT IN AN ORGANIZED HEALTH CARE EDUCATION/TRAINING PROGRAM

## 2024-08-26 PROCEDURE — 92504 EAR MICROSCOPY EXAMINATION: CPT | Performed by: STUDENT IN AN ORGANIZED HEALTH CARE EDUCATION/TRAINING PROGRAM

## 2024-08-26 PROCEDURE — 3008F BODY MASS INDEX DOCD: CPT | Performed by: STUDENT IN AN ORGANIZED HEALTH CARE EDUCATION/TRAINING PROGRAM

## 2024-08-26 RX ORDER — ERGOCALCIFEROL 1.25 MG/1
CAPSULE, LIQUID FILLED ORAL
COMMUNITY
Start: 2024-06-14

## 2024-08-26 RX ORDER — FUROSEMIDE 20 MG
TABLET ORAL
COMMUNITY
Start: 2024-06-10

## 2024-08-26 RX ORDER — FUROSEMIDE 40 MG
TABLET ORAL
COMMUNITY
Start: 2024-06-20

## 2024-08-26 RX ORDER — DICLOFENAC SODIUM 75 MG/1
TABLET, DELAYED RELEASE ORAL
COMMUNITY
Start: 2024-07-11

## 2024-08-26 RX ORDER — OFLOXACIN 3 MG/ML
SOLUTION AURICULAR (OTIC)
COMMUNITY
Start: 2024-08-15

## 2024-08-26 RX ORDER — GABAPENTIN 300 MG/1
CAPSULE ORAL
COMMUNITY
Start: 2024-07-11

## 2024-08-26 RX ORDER — POTASSIUM CHLORIDE 1500 MG/1
TABLET, EXTENDED RELEASE ORAL
COMMUNITY
Start: 2024-06-10

## 2024-08-26 RX ORDER — LEVOTHYROXINE SODIUM 50 UG/1
TABLET ORAL
COMMUNITY
Start: 2024-06-20

## 2024-10-03 ENCOUNTER — OFFICE VISIT (OUTPATIENT)
Dept: SURGERY | Facility: CLINIC | Age: 54
End: 2024-10-03
Payer: COMMERCIAL

## 2024-10-03 VITALS
DIASTOLIC BLOOD PRESSURE: 80 MMHG | SYSTOLIC BLOOD PRESSURE: 118 MMHG | HEART RATE: 91 BPM | WEIGHT: 186 LBS | BODY MASS INDEX: 35.12 KG/M2 | HEIGHT: 61 IN

## 2024-10-03 DIAGNOSIS — M51.16 LUMBAR DISC HERNIATION WITH RADICULOPATHY: ICD-10-CM

## 2024-10-03 DIAGNOSIS — M47.816 FACET SYNDROME, LUMBAR: ICD-10-CM

## 2024-10-03 DIAGNOSIS — M48.061 LUMBAR FORAMINAL STENOSIS: ICD-10-CM

## 2024-10-03 DIAGNOSIS — M51.369 BULGE OF LUMBAR DISC WITHOUT MYELOPATHY: ICD-10-CM

## 2024-10-03 DIAGNOSIS — R20.2 BILATERAL LEG PARESTHESIA: ICD-10-CM

## 2024-10-03 DIAGNOSIS — M54.16 LUMBAR RADICULITIS: ICD-10-CM

## 2024-10-03 DIAGNOSIS — M47.816 LUMBAR SPONDYLOSIS: ICD-10-CM

## 2024-10-03 DIAGNOSIS — Z98.890 S/P LUMBAR SPINE OPERATION: ICD-10-CM

## 2024-10-03 DIAGNOSIS — M54.59 LUMBAR TRIGGER POINT SYNDROME: ICD-10-CM

## 2024-10-03 DIAGNOSIS — Z98.890 POSTOPERATIVE STATE: Primary | ICD-10-CM

## 2024-10-03 DIAGNOSIS — M54.16 LUMBAR RADICULOPATHY: ICD-10-CM

## 2024-10-03 NOTE — PROGRESS NOTES
[facility]  Neurological Surgery Established Patient Clinic Note    Beryl Miles  9/17/1970  [id number]  PCP: Gissell Molina  Referring Provider: Alex Behar, MD     REASON FOR VISIT:  Low back pain with radiation     NEUROSURGICAL PROCEDURES TO DATE:  4/2/2024 - Left minimally invasive Lumbar 5 to Sacral 1 laminoforaminotomy, partial medial facetectomy     HISTORY OF PRESENT ILLNESS 1/8/2024:  Beryl Miles is a(n) 54 year old female  with hyperlipidemia, GERD who is referred for evaluation of low back pain with radiation.  She reports atraumatic onset of low back pain radiating to the left leg approximately 3 years ago.  She has been dealing with this conservatively.  She describes her pain as intermittently radiating down the left lateral aspect of her thigh, calf, and lateral foot.  Any movement seems to exacerbate this.  Leaning forward certainly exacerbates this as well.  Approximately 1 year ago she had a left interlaminar epidural steroid injection by Dr. Behar which gave her great relief.  Most recently she had a right interlaminal epidural steroid injection that provided her with no relief.  She has had physical therapy in the past, with continuing pain.  She denies any balance difficulties, loss of dexterity, significant bilateral lower extremity weakness, or any red flags for myelopathy or cauda equina syndrome.      INTERVAL HISTORY 2/1/2024:  Beryl Miles returns to clinic today for continued spinal follow-up.  She was last seen on 1/8/2024.  She reports stable, persistent left-sided radiating pain.  It continues to affect her posterior thigh, calf, and plantar aspect of her foot.  She has associated numbness and tingling of her foot.  This is exacerbated by standing, and activity.  She had flexion-extension x-rays as well as an MRI of the lumbar spine with sedation. She is here to review those results.     INTERVAL HISTORY  3/21/2024:  Beryl Miles returns to clinic today for preoperative discussions.  She continues to endorse left-sided radicular pain.  No new symptoms or red flags.  She is having her preoperative labs drawn today.     INTERVAL HISTORY 4/14/2024 (from Aron Herrera PA-C):  Beryl Miles is a pleasant 53 year old female who returns to the neurosurgery clinic today approximately 2 weeks s/p left minimally invasive L5-S1 laminoforaminotomy and partial medial facetectomy by Dr. Sampson on 4/2/2024.  The patient reports that she experienced some discomfort immediately postop, but has continued to improve.  Today, she denies any pain.  She notes complete resolution of her preoperative lower extremity symptoms.  No new neurologic complaints.  She is getting around well.  Denies any constitutional symptoms or symptoms concerning for sepsis.  She has not had any issues with her incision site.  Her  is with her today.      INTERVAL HISTORY 5/20/2024 (from Aron Herrera PA-C):  Beryl Miles is a pleasant 53 year old female who returns to the neurosurgery clinic today approximately 4 weeks s/p left minimally invasive L5-S1 laminoforaminotomy and medial facetectomy by Dr. Sampson on 4/2/2024.  Overall, the patient is doing well.  She will experience intermittent left buttock and calf pain, similar to the distribution of her symptoms prior to surgery.  This pain, however, is less in severity than it was preop.  She takes Tylenol with good relief.  She has no new neurologic complaints.  No constitutional symptoms or symptoms concerning for sepsis.  She states that her incision is healing well.  She has been more active in her daily life.     INTERVAL HISTORY 6/27/2024:  Beryl Miles returns to clinic today for 12-week postop follow-up.  Radiculopathies much improved with the exception of 1 she overdoes activity, she has some lingering pains and paresthesias.  This is not comparable to her  preop pain.  However, as of late she is having a myriad of other medical issues.  She reports swelling of the bilateral lower extremities, left greater than right starting around April.  She normally had a history of surgery but also to travel to [place] where she was on a plane for about 4 hours.  She was evaluated by her primary who prescribed her diuretics for fluid overload.  She is now experiencing paresthesias in her lower extremities.    INTERVAL HISTORY 10/3/2024:  Beryl Miles returns to clinic today for continued postoperative follow up. Her radiculopathies remain resolved. However, she continues to have some lower extremity swelling and paresthesias associated with these. She had negative DVTs in 6/27/2024    PAST MEDICAL HISTORY:  Past Medical History:    Esophageal reflux    High cholesterol    Lumbar spondylosis    Migraines    PONV (postoperative nausea and vomiting)    Screen for colon cancer    repeat CLN in 10 years     PAST SURGICAL HISTORY:  Past Surgical History:   Procedure Laterality Date    Colonoscopy screening - referral N/A 7/5/2022    Procedure: COLONOSCOPY-SCREENING;  Surgeon: CB Gann MD;  Location: [facility] ENDOSCOPY    Hysterectomy  2017     FAMILY HISTORY:  family history includes No Known Problems in her father and mother.    SOCIAL HISTORY:   reports that she has never smoked. She has never used smokeless tobacco. She reports that she does not drink alcohol and does not use drugs.    ALLERGIES:  Allergies   Allergen Reactions    Cortisone SWELLING     Patient reports swelling occur.     MEDICATIONS:  Current Outpatient Medications on File Prior to Visit   Medication Sig Dispense Refill    diclofenac 75 MG Oral Tab EC       ergocalciferol 1.25 MG (11787 UT) Oral Cap       furosemide 20 MG Oral Tab       furosemide 40 MG Oral Tab       gabapentin 300 MG Oral Cap       levothyroxine 50 MCG Oral Tab       metFORMIN 500 MG Oral Tab       ofloxacin 0.3 % Otic Solution        Potassium Chloride ER 20 MEQ Oral Tab CR       azelastine 0.1 % Nasal Solution 1 spray by Nasal route 2 (two) times daily. 1 each 1    methylPREDNISolone (MEDROL) 4 MG Oral Tablet Therapy Pack Dosepack: take as directed 1 each 0    acetaminophen 500 MG Oral Tab Take 1 tablet (500 mg total) by mouth every 4 (four) hours as needed. 120 tablet 0    oxyCODONE 5 MG Oral Tab Take 1 tablet (5 mg total) by mouth every 4 (four) hours as needed. 30 tablet 0    methocarbamol 500 MG Oral Tab Take 1 tablet (500 mg total) by mouth 3 (three) times daily as needed. 60 tablet 0    omeprazole 20 MG Oral Capsule Delayed Release Take 1 capsule (20 mg total) by mouth daily. 90 capsule 1    rosuvastatin (CRESTOR) 40 MG Oral Tab Take 1 tablet (40 mg total) by mouth nightly. 90 tablet 1    Naloxone HCl 4 MG/0.1ML Nasal Liquid 4 mg by Nasal route as needed. If patient remains unresponsive, repeat dose in other nostril 2-5 minutes after first dose. (Patient not taking: Reported on 10/3/2024) 1 kit 0     No current facility-administered medications on file prior to visit.     REVIEW OF SYSTEMS:  All other systems were reviewed and were negative except for those previously mentioned in the HPI    PHYSICAL EXAMINATION:  General: No acute distress.  Respiratory: Non-labored respirations bilaterally. No audible wheezing  Cardiovascular: Extremities warm and well-perfused.  Abdomen: Soft, nontender, nondistended.   Musculoskeletal: Moves all extremities well, symmetrically.  Extremities: No edema.     NEUROLOGIC EXAMINATION:  Mental status: Alert and oriented x 3  Speech: Clear, fluent  Cranial nerves: PERRLA, EOMI, face symmetric, with normal strength and sensation, tongue and palate midline, SCM 5/5 bilaterally  Motor:                           RIGHT  Delt 5/5   Bic 5/5  Tri 5/5   HI 5/5    5/5  IP 5/5   Quad 5/5   Ham 5/5   AT 5/5   EHL 5/5 Sterling 5/5                          LEFT    Delt 5/5   Bic 5/5  Tri 5/5   HI 5/5    5/5  IP 5/5    Quad 5/5   Ham 5/5   AT 5/5   EHL 5/5 Sterling 5/5            No pronator drift  Tone: Normal  Atrophy/Fasciculations: None  Sensation: Normal to light touch, symmetric, no neglect  Cerebellar: Normal finger nose finger  Gait: Normal, nondistressed heel toe tandem gait      Reflexes: 2+ throughout, symmetric, no Lily's    IMAGING:  No new neurosurgical imaging for review    ASSESSMENT:  Ms. Miles is now status post MIS left L5-S1 decompression on 4/2/2024 for management of a lumbar radiculopathy.  The lumbar radiculopathy symptoms remain improved, with the exception of when she participates in heavy physical activity but this is vastly improved compared to preop. Unfortunately, she continues dealing with bilateral lower extremity swelling that seems out of proportion. I advised her to discuss with her primary.     Plan:  - RTC 6 months given pain with activity and swelling in BLE, MARLY Sampson MD  Neurological Surgery    [address], [address]  [place] [id number] [phone]  Pager [phone]  10/3/2024 2:48 PM      This note was created using a voice-recognition transcribing system. Incorrect words or phrases may have been missed during proofreading. Please interpret accordingly.    Total Time    Established Patient Total Time       30  minutes.      Activities       Preparing to see the patient (chart/tests/imaging review).       Obtaining and/or reviewing separately obtained history.       Performing a medically appropriate examination and/or evaluation.       Counseling and educating the patient/family/caregiver.       Ordering medications, tests, or procedures.       Referring and communicating with other health care professionals (when not separately reported).       Documenting clincal information in the electronic or other health record.       Independently interpreting results (not separately reported).    Communicating results to the  patient/family/caregiver.    Care coordination (not separately reported).

## 2024-10-03 NOTE — PATIENT INSTRUCTIONS
Refill policies:    Allow 2-3 business days for refills; controlled substances may take longer.  Contact your pharmacy at least 5 days prior to running out of medication and have them send an electronic request or submit request through the “request refill” option in your [employer] account.  Refills are not addressed on weekends; covering physicians do not authorize routine medications on weekends.  No narcotics or controlled substances are refilled after noon on Fridays or by on call physicians.  By law, narcotics must be electronically prescribed.  A 30 day supply with no refills is the maximum allowed.  If your prescription is due for a refill, you may be due for a follow up appointment.  To best provide you care, patients receiving routine medications need to be seen at least once a year.  Patients receiving narcotic/controlled substance medications need to be seen at least once every 3 months.  In the event that your preferred pharmacy does not have the requested medication in stock (e.g. Backordered), it is your responsibility to find another pharmacy that has the requested medication available.  We will gladly send a new prescription to that pharmacy at your request.    Scheduling Tests:    If your physician has ordered radiology tests such as MRI or CT scans, please contact Central Scheduling at [phone] right away to schedule the test.  Once scheduled, the [facility] Centralized Referral Team will work with your insurance carrier to obtain pre-certification or prior authorization.  Depending on your insurance carrier, approval may take 3-10 days.  It is highly recommended patients assure they have received an authorization before having a test performed.  If test is done without insurance authorization, patient may be responsible for the entire amount billed.      Precertification and Prior Authorizations:  If your physician has recommended that you have a procedure or additional testing performed the [facility]  Centralized Referral Team will contact your insurance carrier to obtain pre-certification or prior authorization.    You are strongly encouraged to contact your insurance carrier to verify that your procedure/test has been approved and is a COVERED benefit.  Although the [facility] Centralized Referral Team does its due diligence, the insurance carrier gives the disclaimer that \"Although the procedure is authorized, this does not guarantee payment.\"    Ultimately the patient is responsible for payment.   Thank you for your understanding in this matter.  Paperwork Completion:  If you require FMLA or disability paperwork for your recovery, please make sure to either drop it off or have it faxed to our office at [phone]. Be sure the form has your name and date of birth on it.  The form will be faxed to our Forms Department and they will complete it for you.  There is a 25$ fee for all forms that need to be filled out.  Please be aware there is a 10-14 day turnaround time.  You will need to sign a release of information (JANINE) form if your paperwork does not come with one.  You may call the Forms Department with any questions at [phone].  Their fax number is [phone].

## 2024-10-03 NOTE — PROGRESS NOTES
Established patient:  Reason for follow up: 3 month   Left minimally invasive Lumbar 5 to Sacral 1 laminoforaminotomy, partial medial facetectomy Left       Numeric Rating Scale:         Pain at Present:  0/10       Distribution of Pain:    left    Most recent treatments for Current Pain Condition:   Physical Therapy and Surgery  Response to treatment: some relief

## 2024-10-31 ENCOUNTER — OFFICE VISIT (OUTPATIENT)
Dept: SURGERY | Facility: CLINIC | Age: 54
End: 2024-10-31
Payer: COMMERCIAL

## 2024-10-31 VITALS
DIASTOLIC BLOOD PRESSURE: 72 MMHG | HEIGHT: 61 IN | SYSTOLIC BLOOD PRESSURE: 112 MMHG | BODY MASS INDEX: 35.12 KG/M2 | WEIGHT: 186 LBS | HEART RATE: 89 BPM

## 2024-10-31 DIAGNOSIS — M47.816 LUMBAR SPONDYLOSIS: ICD-10-CM

## 2024-10-31 DIAGNOSIS — M54.59 LUMBAR TRIGGER POINT SYNDROME: ICD-10-CM

## 2024-10-31 DIAGNOSIS — M54.16 LUMBAR RADICULITIS: ICD-10-CM

## 2024-10-31 DIAGNOSIS — M47.816 FACET SYNDROME, LUMBAR: ICD-10-CM

## 2024-10-31 DIAGNOSIS — Z98.890 S/P LUMBAR SPINE OPERATION: Primary | ICD-10-CM

## 2024-10-31 DIAGNOSIS — M51.16 LUMBAR DISC HERNIATION WITH RADICULOPATHY: ICD-10-CM

## 2024-10-31 DIAGNOSIS — M51.369 BULGE OF LUMBAR DISC WITHOUT MYELOPATHY: ICD-10-CM

## 2024-10-31 DIAGNOSIS — M54.16 LUMBAR RADICULOPATHY: ICD-10-CM

## 2024-10-31 DIAGNOSIS — M48.061 LUMBAR FORAMINAL STENOSIS: ICD-10-CM

## 2024-10-31 DIAGNOSIS — R20.2 BILATERAL LEG PARESTHESIA: ICD-10-CM

## 2024-10-31 PROCEDURE — 3008F BODY MASS INDEX DOCD: CPT | Performed by: STUDENT IN AN ORGANIZED HEALTH CARE EDUCATION/TRAINING PROGRAM

## 2024-10-31 PROCEDURE — 3074F SYST BP LT 130 MM HG: CPT | Performed by: STUDENT IN AN ORGANIZED HEALTH CARE EDUCATION/TRAINING PROGRAM

## 2024-10-31 PROCEDURE — 99214 OFFICE O/P EST MOD 30 MIN: CPT | Performed by: STUDENT IN AN ORGANIZED HEALTH CARE EDUCATION/TRAINING PROGRAM

## 2024-10-31 PROCEDURE — 3078F DIAST BP <80 MM HG: CPT | Performed by: STUDENT IN AN ORGANIZED HEALTH CARE EDUCATION/TRAINING PROGRAM

## 2024-10-31 NOTE — PROGRESS NOTES
Established patient:  Reason for follow up: Left minimally invasive Lumbar 5 to Sacral 1 laminoforaminotomy, partial medial facetectomy Left     Numeric Rating Scale:       Pain at Present:  5/10       Distribution of Pain:    left    Most recent treatments for Current Pain Condition:   Physical Therapy and Surgery

## 2024-10-31 NOTE — PROGRESS NOTES
[facility]  Neurological Surgery Established Patient Clinic Note    Beryl Miles  9/17/1970  [id number]  PCP: Gissell Molina  Referring Provider: Alex Behar, MD     REASON FOR VISIT:  Low back pain with radiation     NEUROSURGICAL PROCEDURES TO DATE:  4/2/2024 - Left minimally invasive Lumbar 5 to Sacral 1 laminoforaminotomy, partial medial facetectomy     HISTORY OF PRESENT ILLNESS 1/8/2024:  Beryl Miles is a(n) 54 year old female  with hyperlipidemia, GERD who is referred for evaluation of low back pain with radiation.  She reports atraumatic onset of low back pain radiating to the left leg approximately 3 years ago.  She has been dealing with this conservatively.  She describes her pain as intermittently radiating down the left lateral aspect of her thigh, calf, and lateral foot.  Any movement seems to exacerbate this.  Leaning forward certainly exacerbates this as well.  Approximately 1 year ago she had a left interlaminar epidural steroid injection by Dr. Behar which gave her great relief.  Most recently she had a right interlaminal epidural steroid injection that provided her with no relief.  She has had physical therapy in the past, with continuing pain.  She denies any balance difficulties, loss of dexterity, significant bilateral lower extremity weakness, or any red flags for myelopathy or cauda equina syndrome.      INTERVAL HISTORY 2/1/2024:  Beryl Miles returns to clinic today for continued spinal follow-up.  She was last seen on 1/8/2024.  She reports stable, persistent left-sided radiating pain.  It continues to affect her posterior thigh, calf, and plantar aspect of her foot.  She has associated numbness and tingling of her foot.  This is exacerbated by standing, and activity.  She had flexion-extension x-rays as well as an MRI of the lumbar spine with sedation. She is here to review those results.     INTERVAL HISTORY  3/21/2024:  Beryl Miles returns to clinic today for preoperative discussions.  She continues to endorse left-sided radicular pain.  No new symptoms or red flags.  She is having her preoperative labs drawn today.     INTERVAL HISTORY 4/14/2024 (from Aron Herrera PA-C):  Beryl Miles is a pleasant 53 year old female who returns to the neurosurgery clinic today approximately 2 weeks s/p left minimally invasive L5-S1 laminoforaminotomy and partial medial facetectomy by Dr. Sampson on 4/2/2024.  The patient reports that she experienced some discomfort immediately postop, but has continued to improve.  Today, she denies any pain.  She notes complete resolution of her preoperative lower extremity symptoms.  No new neurologic complaints.  She is getting around well.  Denies any constitutional symptoms or symptoms concerning for sepsis.  She has not had any issues with her incision site.  Her  is with her today.      INTERVAL HISTORY 5/20/2024 (from Aron Herrera PA-C):  Beryl Miles is a pleasant 53 year old female who returns to the neurosurgery clinic today approximately 4 weeks s/p left minimally invasive L5-S1 laminoforaminotomy and medial facetectomy by Dr. Sampson on 4/2/2024.  Overall, the patient is doing well.  She will experience intermittent left buttock and calf pain, similar to the distribution of her symptoms prior to surgery.  This pain, however, is less in severity than it was preop.  She takes Tylenol with good relief.  She has no new neurologic complaints.  No constitutional symptoms or symptoms concerning for sepsis.  She states that her incision is healing well.  She has been more active in her daily life.     INTERVAL HISTORY 6/27/2024:  Beryl Miles returns to clinic today for 12-week postop follow-up.  Radiculopathies much improved with the exception of 1 she overdoes activity, she has some lingering pains and paresthesias.  This is not comparable to her  preop pain.  However, as of late she is having a myriad of other medical issues.  She reports swelling of the bilateral lower extremities, left greater than right starting around April.  She normally had a history of surgery but also to travel to [place] where she was on a plane for about 4 hours.  She was evaluated by her primary who prescribed her diuretics for fluid overload.  She is now experiencing paresthesias in her lower extremities.    INTERVAL HISTORY 10/3/2024:  Beryl Mlies returns to clinic today for continued postoperative follow up. Her radiculopathies remain resolved. However, she continues to have some lower extremity swelling and paresthesias associated with these. She had negative DVTs in 6/27/2024    INTERVAL HISTORY 10/31/2024:  Beryl Miles returns to clinic today early for continued postoperative follow-up as she has continued to have pain involving her left lower back, buttock, posterolateral thigh, and calf.  She is exquisitely tender to palpation her musculature.  She also describes a deep aching pain radiating to her left groin.    PAST MEDICAL HISTORY:  Past Medical History:    Esophageal reflux    High cholesterol    Lumbar spondylosis    Migraines    PONV (postoperative nausea and vomiting)    Screen for colon cancer    repeat CLN in 10 years     PAST SURGICAL HISTORY:  Past Surgical History:   Procedure Laterality Date    Colonoscopy screening - referral N/A 7/5/2022    Procedure: COLONOSCOPY-SCREENING;  Surgeon: CB Gann MD;  Location: [facility] ENDOSCOPY    Hysterectomy  2017     FAMILY HISTORY:  family history includes No Known Problems in her father and mother.    SOCIAL HISTORY:   reports that she has never smoked. She has never used smokeless tobacco. She reports that she does not drink alcohol and does not use drugs.    ALLERGIES:  Allergies   Allergen Reactions    Cortisone SWELLING     Patient reports swelling occur.     MEDICATIONS:  Current Outpatient  Medications on File Prior to Visit   Medication Sig Dispense Refill    diclofenac 75 MG Oral Tab EC       ergocalciferol 1.25 MG (85386 UT) Oral Cap       furosemide 20 MG Oral Tab       furosemide 40 MG Oral Tab       gabapentin 300 MG Oral Cap       levothyroxine 50 MCG Oral Tab       metFORMIN 500 MG Oral Tab       ofloxacin 0.3 % Otic Solution       Potassium Chloride ER 20 MEQ Oral Tab CR       azelastine 0.1 % Nasal Solution 1 spray by Nasal route 2 (two) times daily. 1 each 1    methylPREDNISolone (MEDROL) 4 MG Oral Tablet Therapy Pack Dosepack: take as directed 1 each 0    acetaminophen 500 MG Oral Tab Take 1 tablet (500 mg total) by mouth every 4 (four) hours as needed. 120 tablet 0    oxyCODONE 5 MG Oral Tab Take 1 tablet (5 mg total) by mouth every 4 (four) hours as needed. 30 tablet 0    methocarbamol 500 MG Oral Tab Take 1 tablet (500 mg total) by mouth 3 (three) times daily as needed. 60 tablet 0    Naloxone HCl 4 MG/0.1ML Nasal Liquid 4 mg by Nasal route as needed. If patient remains unresponsive, repeat dose in other nostril 2-5 minutes after first dose. (Patient not taking: Reported on 10/3/2024) 1 kit 0    omeprazole 20 MG Oral Capsule Delayed Release Take 1 capsule (20 mg total) by mouth daily. 90 capsule 1    rosuvastatin (CRESTOR) 40 MG Oral Tab Take 1 tablet (40 mg total) by mouth nightly. 90 tablet 1     No current facility-administered medications on file prior to visit.     REVIEW OF SYSTEMS:  All other systems were reviewed and were negative except for those previously mentioned in the HPI    PHYSICAL EXAMINATION:  General: No acute distress.  Respiratory: Non-labored respirations bilaterally. No audible wheezing  Cardiovascular: Extremities warm and well-perfused.  Abdomen: Soft, nontender, nondistended.   Musculoskeletal: Moves all extremities well, symmetrically.  Extremities: No edema.     NEUROLOGIC EXAMINATION:  Mental status: Alert and oriented x 3  Speech: Clear, fluent  Cranial  nerves: PERRLA, EOMI, face symmetric, with normal strength and sensation, tongue and palate midline, SCM 5/5 bilaterally  Motor:                           RIGHT  Delt 5/5   Bic 5/5  Tri 5/5   HI 5/5    5/5  IP 5/5   Quad 5/5   Ham 5/5   AT 5/5   EHL 5/5 Sterling 5/5                          LEFT    Delt 5/5   Bic 5/5  Tri 5/5   HI 5/5    5/5  IP 5/5   Quad 5/5   Ham 5/5   AT 5/5   EHL 5/5 Sterling 5/5            No pronator drift  Tone: Normal  Atrophy/Fasciculations: None  Sensation: Normal to light touch, symmetric, no neglect  Cerebellar: Normal finger nose finger  Gait: Normal, nondistressed heel toe tandem gait      Reflexes: 2+ throughout, symmetric, no Lily's    IMAGING:  No new neurosurgical imaging for review    ASSESSMENT:  Ms. Miles is now status post MIS left L5-S1 decompression on 4/2/2024 for management of a lumbar radiculopathy.  At first, her radiculopathies were improved, however, she gradually evolved to have more musculoskeletal pain.  Today, she is exquisitely tender in all of her left-sided posterior chain musculature.  Her pain is reproducible with palpation.  She also has a deep aching pain radiating to her left groin.  At this time, I am concerned about potential recurrence of nerve root compression although I think this is less likely.  I am also concerned about unrecognized left hip pathology.  I have ordered an MRI of the lumbar spine as well as an MRI of the left hip.  She will return after the studies to review.  In the meantime, I recommend that she do therapy as this will help alleviate musculoskeletal pain.    Plan:  -Left hip MRI  -MR lumbar with contrast to rule out restenosis or neural element compromise.    Braulio Sampson MD  Neurological Surgery    [address], [address]  [place], [place] [id number] [phone]  Pager [phone]  10/31/2024 3:56 PM      This note was created using a voice-recognition transcribing system.  Incorrect words or phrases may have been missed during proofreading. Please interpret accordingly.    Total Time    Established Patient Total Time       30  minutes.      Activities       Preparing to see the patient (chart/tests/imaging review).       Obtaining and/or reviewing separately obtained history.       Performing a medically appropriate examination and/or evaluation.       Counseling and educating the patient/family/caregiver.       Ordering medications, tests, or procedures.       Referring and communicating with other health care professionals (when not separately reported).       Documenting clincal information in the electronic or other health record.       Independently interpreting results (not separately reported).    Communicating results to the patient/family/caregiver.    Care coordination (not separately reported).

## 2024-12-05 ENCOUNTER — HOSPITAL ENCOUNTER (OUTPATIENT)
Dept: MRI IMAGING | Facility: HOSPITAL | Age: 54
Discharge: HOME | End: 2024-12-05
Attending: STUDENT IN AN ORGANIZED HEALTH CARE EDUCATION/TRAINING PROGRAM
Payer: MEDICAID

## 2024-12-05 DIAGNOSIS — M47.816 LUMBAR SPONDYLOSIS: ICD-10-CM

## 2024-12-05 DIAGNOSIS — M48.061 LUMBAR FORAMINAL STENOSIS: ICD-10-CM

## 2024-12-05 DIAGNOSIS — Z98.890 S/P LUMBAR SPINE OPERATION: ICD-10-CM

## 2024-12-05 DIAGNOSIS — M47.816 FACET SYNDROME, LUMBAR: ICD-10-CM

## 2024-12-05 DIAGNOSIS — M51.369 BULGE OF LUMBAR DISC WITHOUT MYELOPATHY: ICD-10-CM

## 2024-12-05 DIAGNOSIS — R20.2 BILATERAL LEG PARESTHESIA: ICD-10-CM

## 2024-12-05 DIAGNOSIS — M51.16 LUMBAR DISC HERNIATION WITH RADICULOPATHY: ICD-10-CM

## 2024-12-05 DIAGNOSIS — M54.16 LUMBAR RADICULOPATHY: ICD-10-CM

## 2024-12-05 DIAGNOSIS — M54.16 LUMBAR RADICULITIS: ICD-10-CM

## 2024-12-05 DIAGNOSIS — M54.59 LUMBAR TRIGGER POINT SYNDROME: ICD-10-CM

## 2024-12-05 PROCEDURE — A9575 INJ GADOTERATE MEGLUMI 0.1ML: HCPCS | Performed by: STUDENT IN AN ORGANIZED HEALTH CARE EDUCATION/TRAINING PROGRAM

## 2024-12-05 PROCEDURE — 72158 MRI LUMBAR SPINE W/O & W/DYE: CPT | Performed by: STUDENT IN AN ORGANIZED HEALTH CARE EDUCATION/TRAINING PROGRAM

## 2024-12-05 PROCEDURE — 73723 MRI JOINT LWR EXTR W/O&W/DYE: CPT | Performed by: STUDENT IN AN ORGANIZED HEALTH CARE EDUCATION/TRAINING PROGRAM

## 2024-12-05 RX ORDER — GADOTERATE MEGLUMINE 376.9 MG/ML
20 INJECTION INTRAVENOUS
Status: COMPLETED | OUTPATIENT
Start: 2024-12-05 | End: 2024-12-05

## 2024-12-05 RX ADMIN — GADOTERATE MEGLUMINE 18 ML: 376.9 INJECTION INTRAVENOUS at 09:45:00

## 2024-12-06 RX ORDER — ALPRAZOLAM 0.25 MG/1
TABLET ORAL
COMMUNITY
Start: 2024-11-06

## 2024-12-06 NOTE — PATIENT INSTRUCTIONS
Refill policies:    Allow 2-3 business days for refills; controlled substances may take longer.  Contact your pharmacy at least 5 days prior to running out of medication and have them send an electronic request or submit request through the “request refill” option in your [employer] account.  Refills are not addressed on weekends; covering physicians do not authorize routine medications on weekends.  No narcotics or controlled substances are refilled after noon on Fridays or by on call physicians.  By law, narcotics must be electronically prescribed.  A 30 day supply with no refills is the maximum allowed.  If your prescription is due for a refill, you may be due for a follow up appointment.  To best provide you care, patients receiving routine medications need to be seen at least once a year.  Patients receiving narcotic/controlled substance medications need to be seen at least once every 3 months.  In the event that your preferred pharmacy does not have the requested medication in stock (e.g. Backordered), it is your responsibility to find another pharmacy that has the requested medication available.  We will gladly send a new prescription to that pharmacy at your request.    Scheduling Tests:    If your physician has ordered radiology tests such as MRI or CT scans, please contact Central Scheduling at [phone] right away to schedule the test.  Once scheduled, the [facility] Centralized Referral Team will work with your insurance carrier to obtain pre-certification or prior authorization.  Depending on your insurance carrier, approval may take 3-10 days.  It is highly recommended patients assure they have received an authorization before having a test performed.  If test is done without insurance authorization, patient may be responsible for the entire amount billed.      Precertification and Prior Authorizations:  If your physician has recommended that you have a procedure or additional testing performed the [facility]  Centralized Referral Team will contact your insurance carrier to obtain pre-certification or prior authorization.    You are strongly encouraged to contact your insurance carrier to verify that your procedure/test has been approved and is a COVERED benefit.  Although the [facility] Centralized Referral Team does its due diligence, the insurance carrier gives the disclaimer that \"Although the procedure is authorized, this does not guarantee payment.\"    Ultimately the patient is responsible for payment.   Thank you for your understanding in this matter.  Paperwork Completion:  If you require FMLA or disability paperwork for your recovery, please make sure to either drop it off or have it faxed to our office at [phone]. Be sure the form has your name and date of birth on it.  The form will be faxed to our Forms Department and they will complete it for you.  There is a 25$ fee for all forms that need to be filled out.  Please be aware there is a 10-14 day turnaround time.  You will need to sign a release of information (JANINE) form if your paperwork does not come with one.  You may call the Forms Department with any questions at [phone].  Their fax number is [phone].

## 2024-12-09 ENCOUNTER — HOSPITAL ENCOUNTER (OUTPATIENT)
Dept: GENERAL RADIOLOGY | Facility: HOSPITAL | Age: 54
Discharge: HOME | End: 2024-12-09
Attending: STUDENT IN AN ORGANIZED HEALTH CARE EDUCATION/TRAINING PROGRAM
Payer: MEDICAID

## 2024-12-09 ENCOUNTER — OFFICE VISIT (OUTPATIENT)
Dept: SURGERY | Facility: CLINIC | Age: 54
End: 2024-12-09
Payer: MEDICAID

## 2024-12-09 VITALS — SYSTOLIC BLOOD PRESSURE: 115 MMHG | HEART RATE: 89 BPM | DIASTOLIC BLOOD PRESSURE: 74 MMHG | OXYGEN SATURATION: 99 %

## 2024-12-09 DIAGNOSIS — M54.59 MECHANICAL LOW BACK PAIN: ICD-10-CM

## 2024-12-09 DIAGNOSIS — M47.816 LUMBAR SPONDYLOSIS: ICD-10-CM

## 2024-12-09 DIAGNOSIS — M54.16 LUMBAR RADICULOPATHY: ICD-10-CM

## 2024-12-09 DIAGNOSIS — M51.369 BULGE OF LUMBAR DISC WITHOUT MYELOPATHY: ICD-10-CM

## 2024-12-09 DIAGNOSIS — Z98.890 S/P LUMBAR SPINE OPERATION: ICD-10-CM

## 2024-12-09 DIAGNOSIS — M47.816 FACET SYNDROME, LUMBAR: ICD-10-CM

## 2024-12-09 DIAGNOSIS — M54.16 LUMBAR RADICULITIS: ICD-10-CM

## 2024-12-09 DIAGNOSIS — M51.16 LUMBAR DISC HERNIATION WITH RADICULOPATHY: ICD-10-CM

## 2024-12-09 DIAGNOSIS — M48.061 LUMBAR FORAMINAL STENOSIS: ICD-10-CM

## 2024-12-09 DIAGNOSIS — Z98.890 S/P LUMBAR SPINE OPERATION: Primary | ICD-10-CM

## 2024-12-09 PROCEDURE — 72082 X-RAY EXAM ENTIRE SPI 2/3 VW: CPT | Performed by: STUDENT IN AN ORGANIZED HEALTH CARE EDUCATION/TRAINING PROGRAM

## 2024-12-09 PROCEDURE — 72110 X-RAY EXAM L-2 SPINE 4/>VWS: CPT | Performed by: STUDENT IN AN ORGANIZED HEALTH CARE EDUCATION/TRAINING PROGRAM

## 2024-12-09 PROCEDURE — 99215 OFFICE O/P EST HI 40 MIN: CPT | Performed by: STUDENT IN AN ORGANIZED HEALTH CARE EDUCATION/TRAINING PROGRAM

## 2024-12-09 NOTE — PROGRESS NOTES
[facility]  Neurological Surgery Established Patient Clinic Note    Beryl Miles  9/17/1970  [id number]  PCP: Gissell Molina  Referring Provider: Alex Behar, MD     REASON FOR VISIT:  Low back pain with radiation     NEUROSURGICAL PROCEDURES TO DATE:  4/2/2024 - Left minimally invasive Lumbar 5 to Sacral 1 laminoforaminotomy, partial medial facetectomy     HISTORY OF PRESENT ILLNESS 1/8/2024:  Beryl Miles is a(n) 54 year old female  with hyperlipidemia, GERD who is referred for evaluation of low back pain with radiation.  She reports atraumatic onset of low back pain radiating to the left leg approximately 3 years ago.  She has been dealing with this conservatively.  She describes her pain as intermittently radiating down the left lateral aspect of her thigh, calf, and lateral foot.  Any movement seems to exacerbate this.  Leaning forward certainly exacerbates this as well.  Approximately 1 year ago she had a left interlaminar epidural steroid injection by Dr. Behar which gave her great relief.  Most recently she had a right interlaminal epidural steroid injection that provided her with no relief.  She has had physical therapy in the past, with continuing pain.  She denies any balance difficulties, loss of dexterity, significant bilateral lower extremity weakness, or any red flags for myelopathy or cauda equina syndrome.      INTERVAL HISTORY 2/1/2024:  Beryl Miles returns to clinic today for continued spinal follow-up.  She was last seen on 1/8/2024.  She reports stable, persistent left-sided radiating pain.  It continues to affect her posterior thigh, calf, and plantar aspect of her foot.  She has associated numbness and tingling of her foot.  This is exacerbated by standing, and activity.  She had flexion-extension x-rays as well as an MRI of the lumbar spine with sedation. She is here to review those results.     INTERVAL HISTORY  3/21/2024:  Beryl Miles returns to clinic today for preoperative discussions.  She continues to endorse left-sided radicular pain.  No new symptoms or red flags.  She is having her preoperative labs drawn today.     INTERVAL HISTORY 4/14/2024 (from Aron Herrera PA-C):  Beryl Miles is a pleasant 53 year old female who returns to the neurosurgery clinic today approximately 2 weeks s/p left minimally invasive L5-S1 laminoforaminotomy and partial medial facetectomy by Dr. Sampson on 4/2/2024.  The patient reports that she experienced some discomfort immediately postop, but has continued to improve.  Today, she denies any pain.  She notes complete resolution of her preoperative lower extremity symptoms.  No new neurologic complaints.  She is getting around well.  Denies any constitutional symptoms or symptoms concerning for sepsis.  She has not had any issues with her incision site.  Her  is with her today.      INTERVAL HISTORY 5/20/2024 (from Aron Herrera PA-C):  Beryl Miles is a pleasant 53 year old female who returns to the neurosurgery clinic today approximately 4 weeks s/p left minimally invasive L5-S1 laminoforaminotomy and medial facetectomy by Dr. Sampson on 4/2/2024.  Overall, the patient is doing well.  She will experience intermittent left buttock and calf pain, similar to the distribution of her symptoms prior to surgery.  This pain, however, is less in severity than it was preop.  She takes Tylenol with good relief.  She has no new neurologic complaints.  No constitutional symptoms or symptoms concerning for sepsis.  She states that her incision is healing well.  She has been more active in her daily life.     INTERVAL HISTORY 6/27/2024:  Beryl Miles returns to clinic today for 12-week postop follow-up.  Radiculopathies much improved with the exception of 1 she overdoes activity, she has some lingering pains and paresthesias.  This is not comparable to her  preop pain.  However, as of late she is having a myriad of other medical issues.  She reports swelling of the bilateral lower extremities, left greater than right starting around April.  She normally had a history of surgery but also to travel to [place] where she was on a plane for about 4 hours.  She was evaluated by her primary who prescribed her diuretics for fluid overload.  She is now experiencing paresthesias in her lower extremities.    INTERVAL HISTORY 10/3/2024:  Beryl Miles returns to clinic today for continued postoperative follow up. Her radiculopathies remain resolved. However, she continues to have some lower extremity swelling and paresthesias associated with these. She had negative DVTs in 6/27/2024    INTERVAL HISTORY 10/31/2024:  Beryl Miles returns to clinic today early for continued postoperative follow-up as she has continued to have pain involving her left lower back, buttock, posterolateral thigh, and calf.  She is exquisitely tender to palpation her musculature.  She also describes a deep aching pain radiating to her left groin.    INTERVAL HISTORY 12/9/2024:  Since the patient’s last postoperative visit, her initially improved radicular symptoms following the left L5-S1 laminoforaminotomy have gradually recurred. She reports persistent left-sided lower back and buttock pain radiating into the thigh and calf, consistent with her preoperative distribution. She also has focal left hip and buttock pain, which is new for her. Standing, walking, all exacerbate the pain. Recent imaging (MRI of the lumbar spine) demonstrates progressive degenerative changes at L5-S1 with notable left foraminal narrowing, correlating with her recurrent symptoms. A negative left hip MRI has ruled out a hip joint etiology. Currently, her pain is persistent enough to warrant consideration of additional surgical intervention focused on restoring foraminal height and nerve decompression.    PAST  MEDICAL HISTORY:  Past Medical History:    Esophageal reflux    High cholesterol    Lumbar spondylosis    Migraines    PONV (postoperative nausea and vomiting)    Screen for colon cancer    repeat CLN in 10 years     PAST SURGICAL HISTORY:  Past Surgical History:   Procedure Laterality Date    Colonoscopy screening - referral N/A 7/5/2022    Procedure: COLONOSCOPY-SCREENING;  Surgeon: CB Gann MD;  Location: [facility] ENDOSCOPY    Hysterectomy  2017     FAMILY HISTORY:  family history includes No Known Problems in her father and mother.    SOCIAL HISTORY:   reports that she has never smoked. She has never used smokeless tobacco. She reports that she does not drink alcohol and does not use drugs.    ALLERGIES:  Allergies   Allergen Reactions    Cortisone SWELLING     Patient reports swelling occur.     MEDICATIONS:  Current Outpatient Medications on File Prior to Visit   Medication Sig Dispense Refill    ALPRAZolam 0.25 MG Oral Tab       diclofenac 75 MG Oral Tab EC       ergocalciferol 1.25 MG (68398 UT) Oral Cap       furosemide 20 MG Oral Tab       furosemide 40 MG Oral Tab       gabapentin 300 MG Oral Cap  (Patient not taking: Reported on 10/31/2024)      levothyroxine 50 MCG Oral Tab       metFORMIN 500 MG Oral Tab       ofloxacin 0.3 % Otic Solution       Potassium Chloride ER 20 MEQ Oral Tab CR       azelastine 0.1 % Nasal Solution 1 spray by Nasal route 2 (two) times daily. 1 each 1    methylPREDNISolone (MEDROL) 4 MG Oral Tablet Therapy Pack Dosepack: take as directed 1 each 0    acetaminophen 500 MG Oral Tab Take 1 tablet (500 mg total) by mouth every 4 (four) hours as needed. 120 tablet 0    oxyCODONE 5 MG Oral Tab Take 1 tablet (5 mg total) by mouth every 4 (four) hours as needed. (Patient not taking: Reported on 10/31/2024) 30 tablet 0    methocarbamol 500 MG Oral Tab Take 1 tablet (500 mg total) by mouth 3 (three) times daily as needed. 60 tablet 0    Naloxone HCl 4 MG/0.1ML Nasal Liquid 4 mg by Nasal  route as needed. If patient remains unresponsive, repeat dose in other nostril 2-5 minutes after first dose. (Patient not taking: Reported on 10/31/2024) 1 kit 0    omeprazole 20 MG Oral Capsule Delayed Release Take 1 capsule (20 mg total) by mouth daily. 90 capsule 1    rosuvastatin (CRESTOR) 40 MG Oral Tab Take 1 tablet (40 mg total) by mouth nightly. 90 tablet 1     No current facility-administered medications on file prior to visit.     REVIEW OF SYSTEMS:  All other systems were reviewed and were negative except for those previously mentioned in the HPI    PHYSICAL EXAMINATION:  General: No acute distress.  Respiratory: Non-labored respirations bilaterally. No audible wheezing  Cardiovascular: Extremities warm and well-perfused.  Abdomen: Soft, nontender, nondistended.   Musculoskeletal: Moves all extremities well, symmetrically.  Extremities: No edema.     NEUROLOGIC EXAMINATION:  Mental status: Alert and oriented x 3  Speech: Clear, fluent  Cranial nerves: PERRLA, EOMI, face symmetric, with normal strength and sensation, tongue and palate midline, SCM 5/5 bilaterally  Motor:                           RIGHT  Delt 5/5   Bic 5/5  Tri 5/5   HI 5/5    5/5  IP 5/5   Quad 5/5   Ham 5/5   AT 5/5   EHL 5/5 Sterling 5/5                          LEFT    Delt 5/5   Bic 5/5  Tri 5/5   HI 5/5    5/5  IP 5/5   Quad 5/5   Ham 5/5   AT 5/5   EHL 5/5 Sterling 5/5            No pronator drift  Tone: Normal  Atrophy/Fasciculations: None  Sensation: Normal to light touch, symmetric, no neglect  Cerebellar: Normal finger nose finger  Gait: Normal, nondistressed heel toe tandem gait      Reflexes: 2+ throughout, symmetric, no Lily's    IMAGING:  MRI lumbar with and without 12/5/2024: Status post left MIS L5-S1 laminoforaminotomy.  No central stenosis.  However, his previous there has been progression of spondylotic disease at L5-S1 with no left L5-S1 foraminal collapse and compression of the exiting L5 nerve  root.      ASSESSMENT:  Ms Miles’s recurrent left lower extremity radicular symptoms and imaging findings suggest that progressive foraminal stenosis at L5-S1, likely exacerbated by progressive degenerative changes, is causing recurrent nerve root compression. Given further decompression would require facetectomy for definitive treatment, which would destabilize the aforementioned spinal segment, a fusion procedure is indicated to maintain stability and preserve nerve root freedom. An L5-S1 transforaminal lumbar interbody fusion (TLIF) is under strong consideration, although an anterior lumbar interbody fusion (ALIF) approach may also be discussed depending on alignment, bony anatomy, and patient-specific factors. Preoperative imaging with a lumbar CT, flexion-extension lumbar X-rays, and scoliosis films will help delineate spinal alignment, instability, and feasibility of achieving adequate foraminal decompression and fusion.    Plan:  - Obtain CT scan of the lumbar spine, lumbar flexion-extension X-rays, and full-length scoliosis films for comprehensive pre-surgical planning.  - Upon review of the additional imaging, determine the optimal surgical approach (TLIF vs. ALIF) to achieve adequate decompression and stabilization of the L5-S1 segment.  - Continue conservative management measures, including analgesics and activity modification, until surgical plans are finalized.  - Schedule a follow-up appointment to review the imaging results and proceed with preoperative counseling, if indicated, for fusion surgery aimed at alleviating the patient’s foraminal stenosis and radicular symptoms.    Braulio Sampson MD  Neurological Surgery    [facility] [address], [address]  [place] [address]  [phone]  Pager [phone]  12/9/2024 3:35 PM      This note was created using a voice-recognition transcribing system. Incorrect words or phrases may have been missed during  proofreading. Please interpret accordingly.    Total Time    Established Patient Total Time       30  minutes.      Activities       Preparing to see the patient (chart/tests/imaging review).       Obtaining and/or reviewing separately obtained history.       Performing a medically appropriate examination and/or evaluation.       Counseling and educating the patient/family/caregiver.       Ordering medications, tests, or procedures.       Referring and communicating with other health care professionals (when not separately reported).       Documenting clincal information in the electronic or other health record.       Independently interpreting results (not separately reported).    Communicating results to the patient/family/caregiver.    Care coordination (not separately reported).

## 2024-12-13 ENCOUNTER — HOSPITAL ENCOUNTER (OUTPATIENT)
Dept: CT IMAGING | Facility: HOSPITAL | Age: 54
Discharge: HOME | End: 2024-12-13
Attending: STUDENT IN AN ORGANIZED HEALTH CARE EDUCATION/TRAINING PROGRAM
Payer: MEDICAID

## 2024-12-13 DIAGNOSIS — M51.16 LUMBAR DISC HERNIATION WITH RADICULOPATHY: ICD-10-CM

## 2024-12-13 DIAGNOSIS — M51.369 BULGE OF LUMBAR DISC WITHOUT MYELOPATHY: ICD-10-CM

## 2024-12-13 DIAGNOSIS — M54.16 LUMBAR RADICULITIS: ICD-10-CM

## 2024-12-13 DIAGNOSIS — M47.816 FACET SYNDROME, LUMBAR: ICD-10-CM

## 2024-12-13 DIAGNOSIS — M54.16 LUMBAR RADICULOPATHY: ICD-10-CM

## 2024-12-13 DIAGNOSIS — Z98.890 S/P LUMBAR SPINE OPERATION: ICD-10-CM

## 2024-12-13 DIAGNOSIS — M47.816 LUMBAR SPONDYLOSIS: ICD-10-CM

## 2024-12-13 DIAGNOSIS — M54.59 MECHANICAL LOW BACK PAIN: ICD-10-CM

## 2024-12-13 DIAGNOSIS — M48.061 LUMBAR FORAMINAL STENOSIS: ICD-10-CM

## 2024-12-13 PROCEDURE — 72131 CT LUMBAR SPINE W/O DYE: CPT | Performed by: STUDENT IN AN ORGANIZED HEALTH CARE EDUCATION/TRAINING PROGRAM

## 2024-12-27 ENCOUNTER — TELEPHONE (OUTPATIENT)
Dept: SURGERY | Facility: CLINIC | Age: 54
End: 2024-12-27

## 2024-12-27 NOTE — TELEPHONE ENCOUNTER
Called with  Fernando, ID # [id number].     Patient had surgery in April by Dr. Sampson for Left minimally invasive Lumbar 5 to Sacral 1 laminoforaminotomy, partial medial facetectomy      For the last month the patient has been having pain in neck and head bilaterally. Patient not able to move her head, she does not know how to describe the pain. Patient states when she lays down the headache improves but does not go entirely away,  and she has the pain when she is up walking around. Patient rates pain as an 8/10. Pain is not relieved by tylenol or ibuprofen.     Also has pain around waist comes in the evenings usually and makes it difficult to get out of bed. Left side of waist. No pain, tingling or numbness down legs. Patient does note loss of urine control that she has had since before surgery but states it is worse now. Urinary incontence happens when patient coughs, sneezes or laughs. She does not spontaneously loose urine.     Advised patient that we will see what recommendations the doctor has and call her back on Monday. Advised that if pain is unmanageable over the weekend patient should proceed to the ED for evaluation. Patient appreciative of call.

## 2025-01-03 NOTE — TELEPHONE ENCOUNTER
Message below noted.    Per PA patient to follow-up with Dr. Sampson for evaluation and to discuss symptoms.    Routed to  to assist patient with scheduling.

## 2025-01-03 NOTE — TELEPHONE ENCOUNTER
Future Appointments   Date Time Provider Department Center   1/6/2025  3:30 PM Braulio Sampson MD [facility]

## 2025-01-03 NOTE — TELEPHONE ENCOUNTER
Hi I have been out of town and am just now receiving this message. I would recommend the patient come in for evaluation given her new neck complaints, which would not be related to her surgery on her low back in April. She was also recommended to follow up with Dr. Sampson after she completed her lumbar imaging for surgical discussion, which is done. Can we please get her an appointment with Dr. Sampson to discuss further?

## 2025-01-08 ENCOUNTER — OFFICE VISIT (OUTPATIENT)
Dept: SURGERY | Facility: CLINIC | Age: 55
End: 2025-01-08
Payer: MEDICAID

## 2025-01-08 DIAGNOSIS — M48.061 LUMBAR FORAMINAL STENOSIS: ICD-10-CM

## 2025-01-08 DIAGNOSIS — M54.16 LUMBAR RADICULITIS: ICD-10-CM

## 2025-01-08 DIAGNOSIS — M79.89 LEG SWELLING: ICD-10-CM

## 2025-01-08 DIAGNOSIS — M54.59 LUMBAR TRIGGER POINT SYNDROME: ICD-10-CM

## 2025-01-08 DIAGNOSIS — M47.816 LUMBAR SPONDYLOSIS: ICD-10-CM

## 2025-01-08 DIAGNOSIS — M54.59 MECHANICAL LOW BACK PAIN: ICD-10-CM

## 2025-01-08 DIAGNOSIS — M47.816 FACET SYNDROME, LUMBAR: ICD-10-CM

## 2025-01-08 DIAGNOSIS — Z98.890 S/P LUMBAR SPINE OPERATION: Primary | ICD-10-CM

## 2025-01-08 DIAGNOSIS — M54.16 LUMBAR RADICULOPATHY: ICD-10-CM

## 2025-01-08 DIAGNOSIS — M51.16 LUMBAR DISC HERNIATION WITH RADICULOPATHY: ICD-10-CM

## 2025-01-08 DIAGNOSIS — M51.369 BULGE OF LUMBAR DISC WITHOUT MYELOPATHY: ICD-10-CM

## 2025-01-08 DIAGNOSIS — R20.2 BILATERAL LEG PARESTHESIA: ICD-10-CM

## 2025-01-08 NOTE — PROGRESS NOTES
The following individuals verbally consented to be recorded using ambient AI listening technology and understand that they can each withdraw their consent to this listening technology at any point by asking the clinician to turn off or pause the recording: Beryl Miles and Asya Miles agreed       Established patient:  Reason for follow up: follow Left minimally invasive Lumbar 5 to Sacral 1 laminoforaminotomy, partial medial facetectomy Left     Numeric Rating Scale:      Pain at Present:  8/10       Distribution of Pain:    left    Most recent treatments for Current Pain Condition:   Physical Therapy and Surgery  Response to treatment: worsening states she has a shooting pain on the left side in her buttocks. This happens when she is standing up and sitting down            no

## 2025-01-08 NOTE — PROGRESS NOTES
[facility]  Neurological Surgery Established Patient Clinic Note    Beryl Miles  9/17/1970  [id number]  PCP: Gissell Molina  Referring Provider: Alex Behar, MD     REASON FOR VISIT:  Low back pain with radiation     NEUROSURGICAL PROCEDURES TO DATE:  4/2/2024 - Left minimally invasive Lumbar 5 to Sacral 1 laminoforaminotomy, partial medial facetectomy     HISTORY OF PRESENT ILLNESS 1/8/2024:  Beryl Miles is a(n) 54 year old female  with hyperlipidemia, GERD who is referred for evaluation of low back pain with radiation.  She reports atraumatic onset of low back pain radiating to the left leg approximately 3 years ago.  She has been dealing with this conservatively.  She describes her pain as intermittently radiating down the left lateral aspect of her thigh, calf, and lateral foot.  Any movement seems to exacerbate this.  Leaning forward certainly exacerbates this as well.  Approximately 1 year ago she had a left interlaminar epidural steroid injection by Dr. Behar which gave her great relief.  Most recently she had a right interlaminal epidural steroid injection that provided her with no relief.  She has had physical therapy in the past, with continuing pain.  She denies any balance difficulties, loss of dexterity, significant bilateral lower extremity weakness, or any red flags for myelopathy or cauda equina syndrome.      INTERVAL HISTORY 2/1/2024:  Beryl Miles returns to clinic today for continued spinal follow-up.  She was last seen on 1/8/2024.  She reports stable, persistent left-sided radiating pain.  It continues to affect her posterior thigh, calf, and plantar aspect of her foot.  She has associated numbness and tingling of her foot.  This is exacerbated by standing, and activity.  She had flexion-extension x-rays as well as an MRI of the lumbar spine with sedation. She is here to review those results.     INTERVAL HISTORY  3/21/2024:  Beryl Miles returns to clinic today for preoperative discussions.  She continues to endorse left-sided radicular pain.  No new symptoms or red flags.  She is having her preoperative labs drawn today.     INTERVAL HISTORY 4/14/2024 (from Aron Herrera PA-C):  Beryl Miles is a pleasant 53 year old female who returns to the neurosurgery clinic today approximately 2 weeks s/p left minimally invasive L5-S1 laminoforaminotomy and partial medial facetectomy by Dr. Sampson on 4/2/2024.  The patient reports that she experienced some discomfort immediately postop, but has continued to improve.  Today, she denies any pain.  She notes complete resolution of her preoperative lower extremity symptoms.  No new neurologic complaints.  She is getting around well.  Denies any constitutional symptoms or symptoms concerning for sepsis.  She has not had any issues with her incision site.  Her  is with her today.      INTERVAL HISTORY 5/20/2024 (from Aron Herrera PA-C):  Beryl Miles is a pleasant 53 year old female who returns to the neurosurgery clinic today approximately 4 weeks s/p left minimally invasive L5-S1 laminoforaminotomy and medial facetectomy by Dr. Sampson on 4/2/2024.  Overall, the patient is doing well.  She will experience intermittent left buttock and calf pain, similar to the distribution of her symptoms prior to surgery.  This pain, however, is less in severity than it was preop.  She takes Tylenol with good relief.  She has no new neurologic complaints.  No constitutional symptoms or symptoms concerning for sepsis.  She states that her incision is healing well.  She has been more active in her daily life.     INTERVAL HISTORY 6/27/2024:  Beryl Miles returns to clinic today for 12-week postop follow-up.  Radiculopathies much improved with the exception of 1 she overdoes activity, she has some lingering pains and paresthesias.  This is not comparable to her  preop pain.  However, as of late she is having a myriad of other medical issues.  She reports swelling of the bilateral lower extremities, left greater than right starting around April.  She normally had a history of surgery but also to travel to [place] where she was on a plane for about 4 hours.  She was evaluated by her primary who prescribed her diuretics for fluid overload.  She is now experiencing paresthesias in her lower extremities.    INTERVAL HISTORY 10/3/2024:  Beryl Miles returns to clinic today for continued postoperative follow up. Her radiculopathies remain resolved. However, she continues to have some lower extremity swelling and paresthesias associated with these. She had negative DVTs in 6/27/2024    INTERVAL HISTORY 10/31/2024:  Beryl Miles returns to clinic today early for continued postoperative follow-up as she has continued to have pain involving her left lower back, buttock, posterolateral thigh, and calf.  She is exquisitely tender to palpation her musculature.  She also describes a deep aching pain radiating to her left groin.    INTERVAL HISTORY 12/9/2024:  Since the patient’s last postoperative visit, her initially improved radicular symptoms following the left L5-S1 laminoforaminotomy have gradually recurred. She reports persistent left-sided lower back and buttock pain radiating into the thigh and calf, consistent with her preoperative distribution. She also has focal left hip and buttock pain, which is new for her. Standing, walking, all exacerbate the pain. Recent imaging (MRI of the lumbar spine) demonstrates progressive degenerative changes at L5-S1 with notable left foraminal narrowing, correlating with her recurrent symptoms. A negative left hip MRI has ruled out a hip joint etiology. Currently, her pain is persistent enough to warrant consideration of additional surgical intervention focused on restoring foraminal height and nerve decompression.    INTERVAL  HISTORY 1/8/2025:  Sasha Miles returns today reporting persistent and progressively worsening low back pain, particularly with transitions from sitting to standing. She also notes left lower extremity (LE) discomfort and intermittent swelling bilaterally, more pronounced on the left. She has a history of a left minimally invasive L5-S1 laminoforaminotomy and partial medial facetectomy on April 2, 2024, which initially provided symptom relief but now the pain has recurred. Current imaging (including CT, flexion-extension X-rays, and scoliosis films) demonstrates signs of left-sided facet degenerative changes at L5-S1 and foraminal narrowing with apparent joint instability, suggesting that further decompression without fusion would risk significant destabilization.    PAST MEDICAL HISTORY:  Past Medical History:    Esophageal reflux    High cholesterol    Lumbar spondylosis    Migraines    PONV (postoperative nausea and vomiting)    Screen for colon cancer    repeat CLN in 10 years     PAST SURGICAL HISTORY:  Past Surgical History:   Procedure Laterality Date    Colonoscopy screening - referral N/A 7/5/2022    Procedure: COLONOSCOPY-SCREENING;  Surgeon: CB Gann MD;  Location: [facility] ENDOSCOPY    Hysterectomy  2017     FAMILY HISTORY:  family history includes No Known Problems in her father and mother.    SOCIAL HISTORY:   reports that she has never smoked. She has never used smokeless tobacco. She reports that she does not drink alcohol and does not use drugs.    ALLERGIES:  Allergies   Allergen Reactions    Cortisone SWELLING     Patient reports swelling occur.     MEDICATIONS:  Current Outpatient Medications on File Prior to Visit   Medication Sig Dispense Refill    baclofen 10 MG Oral Tab       ALPRAZolam 0.25 MG Oral Tab       diclofenac 75 MG Oral Tab EC       ergocalciferol 1.25 MG (77614 UT) Oral Cap       furosemide 20 MG Oral Tab       furosemide 40 MG Oral Tab       levothyroxine 50 MCG Oral  Tab       metFORMIN 500 MG Oral Tab       ofloxacin 0.3 % Otic Solution       Potassium Chloride ER 20 MEQ Oral Tab CR       azelastine 0.1 % Nasal Solution 1 spray by Nasal route 2 (two) times daily. 1 each 1    acetaminophen 500 MG Oral Tab Take 1 tablet (500 mg total) by mouth every 4 (four) hours as needed. 120 tablet 0    omeprazole 20 MG Oral Capsule Delayed Release Take 1 capsule (20 mg total) by mouth daily. 90 capsule 1    rosuvastatin (CRESTOR) 40 MG Oral Tab Take 1 tablet (40 mg total) by mouth nightly. 90 tablet 1    gabapentin 300 MG Oral Cap  (Patient not taking: Reported on 1/8/2025)      methylPREDNISolone (MEDROL) 4 MG Oral Tablet Therapy Pack Dosepack: take as directed (Patient not taking: Reported on 1/8/2025) 1 each 0    oxyCODONE 5 MG Oral Tab Take 1 tablet (5 mg total) by mouth every 4 (four) hours as needed. (Patient not taking: Reported on 1/8/2025) 30 tablet 0    methocarbamol 500 MG Oral Tab Take 1 tablet (500 mg total) by mouth 3 (three) times daily as needed. (Patient not taking: Reported on 1/8/2025) 60 tablet 0    Naloxone HCl 4 MG/0.1ML Nasal Liquid 4 mg by Nasal route as needed. If patient remains unresponsive, repeat dose in other nostril 2-5 minutes after first dose. (Patient not taking: Reported on 10/3/2024) 1 kit 0     No current facility-administered medications on file prior to visit.     REVIEW OF SYSTEMS:  All other systems were reviewed and were negative except for those previously mentioned in the HPI    PHYSICAL EXAMINATION:  General: No acute distress.  Respiratory: Non-labored respirations bilaterally. No audible wheezing  Cardiovascular: Extremities warm and well-perfused.  Abdomen: Soft, nontender, nondistended.   Musculoskeletal: Moves all extremities well, symmetrically.  Extremities: No edema.     NEUROLOGIC EXAMINATION:  Mental status: Alert and oriented x 3  Speech: Clear, fluent  Cranial nerves: PERRLA, EOMI, face symmetric, with normal strength and sensation,  tongue and palate midline, SCM 5/5 bilaterally  Motor:                           RIGHT  Delt 5/5   Bic 5/5  Tri 5/5   HI 5/5    5/5  IP 5/5   Quad 5/5   Ham 5/5   AT 5/5   EHL 5/5 [place] 5/5                          LEFT    Delt 5/5   Bic 5/5  Tri 5/5   HI 5/5    5/5  IP 5/5   Quad 5/5   Ham 5/5   AT 5/5   EHL 5/5 [place] 5/5            No pronator drift  Tone: Normal  Atrophy/Fasciculations: None  Sensation: Normal to light touch, symmetric, no neglect  Cerebellar: Normal finger nose finger  Gait: Normal, nondistressed heel toe tandem gait      Reflexes: 2+ throughout, symmetric, no Lily's    IMAGING:  MRI lumbar with and without 12/5/2024: Status post left MIS L5-S1 laminoforaminotomy.  No central stenosis.  However, his previous there has been progression of spondylotic disease at L5-S1 with no left L5-S1 foraminal collapse and compression of the exiting L5 nerve root.    XR lumbar flexion-extension 12/9/2024: Degenerative changes throughout the lumbar spine particularly L5-S1 with no evidence of dynamic instabilities.    XR scoliosis 12/9/2024: No significant hip or leg length discrepancy identified.  Mild levoscoliosis with an apex around L4. On sagittal views there is a slightly positive SVA of about 5 cm.  However, no significant PI to lumbar lordosis mismatch.  Sacral slope is 38 degrees.    CT lumbar 12/13/2024: Good bone density per Hounsfield units.  There is progressive degenerative disease affecting the left L5-S1 facet with apparent widening compared to the right. This suggests unilateral facet instability. No fractures identified.        ASSESSMENT:  Ms. Miles has recurrent left-sided radicular symptoms likely secondary to progressive foraminal collapse at L5-S1 and left facet arthropathy. Redo decompression would require extensive resection of the facet joint, leading to further instability; thus, a fusion procedure is necessary to stabilize the segment and prevent ongoing collapse. Because  bone quality is adequate and there is no prohibitive comorbidity on imaging, surgical stabilization with instrumentation and interbody fusion (e.g., TLIF) is a viable option. However, before proceeding with definitive surgery, a confirmatory diagnostic injection may be beneficial given her reported history of swelling with prior corticosteroid use; a lidocaine-based injection (without steroid) at the suspected pain generator can bolster confidence in surgical planning.    PLAN:  - Refer for a left L5-S1 nerve root diagnostic injection, preferably avoiding steroid as she experienced adverse effects from cortisone in the past. If the injection significantly relieves her characteristic pain temporarily, this will confirm the pain generator.  - If diagnostic injection confirms left L5-S1 as the culprit, proceed with plans for an instrumented fusion (e.g., TLIF) to stabilize the segment and decompress the nerve root.  - Reviewed operative risks, including screw loosening, infection, neurologic injury, and potential future adjacent-level disease. Emphasized that hardware placement is necessary due to existing segmental instability.  - Continue advising weight reduction, as feasible, to decrease axial load on the spine and facilitate postoperative recovery.  - Encouraged low-impact activities and gentle strengthening exercises (as tolerated) to maintain or improve paraspinal and core muscle support.    Braulio Sampson MD  Neurological Surgery    [address], [address]  [place], [place] [id number] [phone]  Pager [phone]  1/8/2025 5:14 PM      This note was created using a voice-recognition transcribing system. Incorrect words or phrases may have been missed during proofreading. Please interpret accordingly.      Total Time    Established Patient Total Time       30  minutes.      Activities       Preparing to see the patient (chart/tests/imaging review).        Obtaining and/or reviewing separately obtained history.       Performing a medically appropriate examination and/or evaluation.       Counseling and educating the patient/family/caregiver.       Ordering medications, tests, or procedures.       Referring and communicating with other health care professionals (when not separately reported).       Documenting clincal information in the electronic or other health record.       Independently interpreting results (not separately reported).    Communicating results to the patient/family/caregiver.    Care coordination (not separately reported).

## 2025-01-13 ENCOUNTER — OFFICE VISIT (OUTPATIENT)
Dept: PHYSICAL MEDICINE AND REHAB | Facility: CLINIC | Age: 55
End: 2025-01-13
Payer: MEDICAID

## 2025-01-13 ENCOUNTER — TELEPHONE (OUTPATIENT)
Dept: PHYSICAL MEDICINE AND REHAB | Facility: CLINIC | Age: 55
End: 2025-01-13

## 2025-01-13 DIAGNOSIS — M54.16 LUMBAR RADICULOPATHY: ICD-10-CM

## 2025-01-13 DIAGNOSIS — M47.816 FACET SYNDROME, LUMBAR: ICD-10-CM

## 2025-01-13 DIAGNOSIS — M47.816 LUMBAR SPONDYLOSIS: ICD-10-CM

## 2025-01-13 DIAGNOSIS — I10 ESSENTIAL HYPERTENSION: ICD-10-CM

## 2025-01-13 DIAGNOSIS — E78.5 HYPERLIPIDEMIA, UNSPECIFIED HYPERLIPIDEMIA TYPE: ICD-10-CM

## 2025-01-13 DIAGNOSIS — M54.59 MECHANICAL LOW BACK PAIN: Primary | ICD-10-CM

## 2025-01-13 DIAGNOSIS — M99.9 BIOMECHANICAL LESION: ICD-10-CM

## 2025-01-13 DIAGNOSIS — M79.10 MYALGIA: ICD-10-CM

## 2025-01-13 DIAGNOSIS — M51.372 DEGENERATION OF INTERVERTEBRAL DISC OF LUMBOSACRAL REGION WITH DISCOGENIC BACK PAIN AND LOWER EXTREMITY PAIN: ICD-10-CM

## 2025-01-13 DIAGNOSIS — M51.16 LUMBAR DISC HERNIATION WITH RADICULOPATHY: ICD-10-CM

## 2025-01-13 DIAGNOSIS — M48.061 LUMBAR FORAMINAL STENOSIS: ICD-10-CM

## 2025-01-13 DIAGNOSIS — M51.369 BULGE OF LUMBAR DISC WITHOUT MYELOPATHY: ICD-10-CM

## 2025-01-13 NOTE — PATIENT INSTRUCTIONS
1) My office will call you to schedule the LEFT L5 SNRB under local anesthesia once the procedure is approved by your insurance carrier.        To ensure the accuracy of condition updates after your procedure, Dr. Behar would like for you to keep a written record of your pain for up to 12 hours after your injection. When you provide office staff with your condition update post injection - please refer back to this document for ease of communication.     Pain level prior to procedure: 0  1   2   3   4   5   6   7   8   9   10  (No Pain) 0  to  10 (Worst Pain); Please [place] corresponding number above.       Pain level immediately following procedure: 0  1   2   3   4   5   6   7   8   9   10  (No Pain) 0  to  10 (Worst Pain); Please [place] corresponding number above.           Please keep a close record of your pain for the next 12 hours to refer back to when speaking with office staff.      Percentage (%) of Relief:   0% - 100%  (0% = No Relief; 100% = Total Relief)   2 Hours After Procedure:     4 Hours After Procedure:     6 Hours After Procedure:     8 Hours After Procedure:     12 Hours After Procedure:

## 2025-01-13 NOTE — PROGRESS NOTES
[facility]  Progress Note    CHIEF COMPLAINT:    Chief Complaint   Patient presents with    Low Back Pain     LOV: 12/19/2023 pt comes in with L buttock pain. Pain can radiates down L leg. Denies T/N. Denies weakness. Rates pain 5/10. Takes Tylenol PRN. She had a partial medial facetectomy done on 4/2/2024 with Dr. Sampson.       History of Present Illness:  The patient is a 54 year old RIGHT-handed female with significant past medical history of hyperlipidemia and GERD who presents for follow up with complaints of bilateral low back pain with radiation into the left buttock.  She is status post minimally invasive left L5-S1 laminoforaminotomy with partial medial facetectomy by Dr. Sampson on 4/2/2024.  She rates her pain today a 5 out of 10 with radiation to the buttock.  Her symptoms are aggravated by standing for long periods of time as well as sitting.  She denies any further radicular symptoms down the leg as she feels the surgery was very helpful for this.  She has been taking Tylenol as needed for pain.  She denies any weakness.  She denies any numbness or tingling.    PAST MEDICAL HISTORY:  Past Medical History:    Esophageal reflux    High cholesterol    Lumbar spondylosis    Migraines    PONV (postoperative nausea and vomiting)    Screen for colon cancer    repeat CLN in 10 years       SURGICAL HISTORY:  Past Surgical History:   Procedure Laterality Date    Colonoscopy screening - referral N/A 7/5/2022    Procedure: COLONOSCOPY-SCREENING;  Surgeon: CB Gann MD;  Location: [facility] ENDOSCOPY    Hysterectomy  2017       SOCIAL HISTORY:   Social History     Occupational History    Not on file   Tobacco Use    Smoking status: Never    Smokeless tobacco: Never   Vaping Use    Vaping status: Never Used   Substance and Sexual Activity    Alcohol use: Never    Drug use: Never    Sexual activity: Not on file       FAMILY HISTORY:   Family History   Problem  Relation Age of Onset    No Known Problems Father     No Known Problems Mother     Breast Cancer Neg        CURRENT MEDICATIONS:   Current Outpatient Medications   Medication Sig Dispense Refill    baclofen 10 MG Oral Tab       ALPRAZolam 0.25 MG Oral Tab       diclofenac 75 MG Oral Tab EC       ergocalciferol 1.25 MG (34524 UT) Oral Cap       furosemide 20 MG Oral Tab       furosemide 40 MG Oral Tab       gabapentin 300 MG Oral Cap  (Patient not taking: Reported on 1/8/2025)      levothyroxine 50 MCG Oral Tab       metFORMIN 500 MG Oral Tab       ofloxacin 0.3 % Otic Solution       Potassium Chloride ER 20 MEQ Oral Tab CR       azelastine 0.1 % Nasal Solution 1 spray by Nasal route 2 (two) times daily. 1 each 1    methylPREDNISolone (MEDROL) 4 MG Oral Tablet Therapy Pack Dosepack: take as directed (Patient not taking: Reported on 1/8/2025) 1 each 0    acetaminophen 500 MG Oral Tab Take 1 tablet (500 mg total) by mouth every 4 (four) hours as needed. 120 tablet 0    oxyCODONE 5 MG Oral Tab Take 1 tablet (5 mg total) by mouth every 4 (four) hours as needed. (Patient not taking: Reported on 1/8/2025) 30 tablet 0    methocarbamol 500 MG Oral Tab Take 1 tablet (500 mg total) by mouth 3 (three) times daily as needed. (Patient not taking: Reported on 1/8/2025) 60 tablet 0    Naloxone HCl 4 MG/0.1ML Nasal Liquid 4 mg by Nasal route as needed. If patient remains unresponsive, repeat dose in other nostril 2-5 minutes after first dose. (Patient not taking: Reported on 10/3/2024) 1 kit 0    omeprazole 20 MG Oral Capsule Delayed Release Take 1 capsule (20 mg total) by mouth daily. 90 capsule 1    rosuvastatin (CRESTOR) 40 MG Oral Tab Take 1 tablet (40 mg total) by mouth nightly. 90 tablet 1       ALLERGIES:   Allergies[1]    REVIEW OF SYSTEMS:   Review of Systems   Constitutional: Negative.    HENT: Negative.    Eyes: Negative.    Respiratory: Negative.    Cardiovascular: Negative.    Gastrointestinal: Negative.     Genitourinary: Negative.    Musculoskeletal: As per HPI  Skin: Negative.    Neurological: As per HPI  Endo/Heme/Allergies: Negative.    Psychiatric/Behavioral: Negative.      All other systems reviewed and are negative. Pertinent positives and negatives noted in the HPI.        PHYSICAL EXAM:   There were no vitals taken for this visit.    There is no height or weight on file to calculate BMI.      General: No immediate distress  Head: Normocephalic/ Atraumatic  Eyes: Extra-occular movements intact.   Ears: No auricular hematoma or deformities  Mouth: No lesions or ulcerations  Heart: peripheral pulses intact. Normal capillary refill.   Lungs: Non-labored respirations  Abdomen: No abdominal guarding  Extremities: No lower extremity edema bilaterally   Skin: No lesions noted.   Cognition: alert & oriented x 3, attentive, able to follow 2 step commands, comprehention intact, spontaneous speech intact  Motor:    Musculoskeletal:        Data  No visits with results within 6 Month(s) from this visit.   Latest known visit with results is:   Office Visit on 05/21/2024   Component Date Value Ref Range Status    Cholesterol, Total 05/21/2024 133  <200 mg/dL Final    HDL Cholesterol 05/21/2024 46  40 - 59 mg/dL Final    Triglycerides 05/21/2024 196 (H)  30 - 149 mg/dL Final    LDL Cholesterol 05/21/2024 55  <100 mg/dL Final    VLDL 05/21/2024 28  0 - 30 mg/dL Final    Non HDL Chol 05/21/2024 87  <130 mg/dL Final    Patient Fasting for Lipid? 05/21/2024 Yes   Final   ]      Radiology Imaging:  I reviewed with the patient her CT and MRI of the lumbar spine   PROCEDURE: MRI SPINE LUMBAR (W+WO) (CPT=72158)     COMPARISON: [facility], MRI SPINE LUMBAR (CPT=72148), 1/29/2024, 1:55 PM.     INDICATIONS: R20.2 Bilateral leg paresthesia M54.59 Lumbar trigger point syndrome M47.816 Facet syndrome, lumbar M51.16 Lumbar disc herniation with radiculopathy M51.369 Bu*     TECHNIQUE: A comprehensive examination was  performed utilizing a variety of imaging planes and imaging parameters to optimize visualization of suspected pathology.  Images were performed before and after the administration of intravenous gadolinium  contrast.       FINDINGS:     SOFT TISSUES: Normal with no visible mass.    ALIGMENT: No significant listhesis.  BONES: No acute fracture.  No suspicious osseous lesion.  Small multilevel osteophytes.  Mildly heterogeneous appearance of the bone marrow similar to the prior exam.  SACROILIAC JOINTS: No acute abnormality.  CORD/CAUDA EQUINA: Normal caliber, contour, and signal intensity.  No intrathecal mass or abnormal enhancement.       LUMBAR DISC LEVELS:  L1-L2: No spinal canal or foraminal stenosis.  L2-L3: Minimal disc bulge with no spinal canal or foraminal stenosis.  L3-L4: Minimal disc bulge and facet arthropathy.  No significant spinal canal stenosis.  Mild right foraminal stenosis.  L4-L5: Mild disc bulge and facet arthropathy.  Mild spinal canal stenosis and mild bilateral foraminal stenosis.  L5-S1: Mild disc bulge and facet arthropathy.  No significant spinal canal stenosis.  Moderate left foraminal stenosis.                  Impression   CONCLUSION:     Multifactorial degenerative changes result in moderate left foraminal stenosis at L5-S1.  Additional levels of mild spinal canal and foraminal stenosis are described in the body of this report.     The spinal cord and cauda equina have normal signal and caliber.  No intrathecal mass or abnormal enhancement.           Dictated by (CST): Shaan Lindo MD on 12/06/2024 at 1:29 PM      Finalized by (CST): Shaan Lindo MD on 12/06/2024 at 1:34 PM         CT SPINE LUMBAR (CPT=72131)  Narrative: PROCEDURE: CT SPINE LUMBAR (CPT=72131)     COMPARISON: [facility], XR LUMBAR SPINE AP LAT FLEX EXT (CPT=72110), 12/09/2024, 3:58 PM.  [facility], MRI SPINE LUMBAR (W+WO) (CPT=72158), 12/05/2024, 8:20 AM.      INDICATIONS: M54.59 Mechanical low back pain M47.816 Facet syndrome, lumbar M51.16 Lumbar disc herniation with radiculopathy M51.369 Bulge of lumbar disc without myelopathy*     TECHNIQUE:   Multi-planar CT images were obtained without intravenous contrast material.  Automated exposure control for dose reduction was used. Adjustment of the mA and/or kV was done based on the patient's size. Use of iterative reconstruction   technique for dose reduction was used.  Dose information is transmitted to the ACR (American College of Radiology) NRDR (National Radiology Data Registry) which includes the Dose Index Registry.        FINDINGS:   PARASPINAL AREA: No visible mass.  BONES:   No acute fracture or malalignment.  There is multilevel degenerative change with mild marginal osteophyte formation and facet arthropathy.  Status post L5-S1 posterior decompression/laminectomies.  ALIGNMENT:   Normal.    LUMBAR DISC LEVELS: Posterior postsurgical decompression L5-S1 noted without significant central canal narrowing.  Additional multilevel degenerative change in the lumbar spine unchanged since the prior MRI 12/05/2024.  OTHER:   Negative.                Impression: CONCLUSION:      Multilevel lumbar spine degenerative change status post laminectomy L5-S1.  Lumbar spine degenerative change otherwise as described in detail on prior MRI lumbar spine 12/05/2024.              Dictated by (CST): Bronson Ruffin MD on 12/16/2024 at 1:55 PM       Finalized by (CST): Bronson Ruffin MD on 12/16/2024 at 2:02 PM              ASSESSMENT AND PLAN:  Beryl is a pleasant 54-year-old female presents for complaints of left-sided low back and buttock pain with radiation only to the buttock.  I have discussed the case with Dr. Sampson who performed a minimally invasive left laminoforaminotomy and partial medial facetectomy in April 2024.  We have discussed performing a left L5 selective nerve root block to determine if her symptoms are  stemming from the foraminal stenosis compressing the L5 nerve root as it exits the foramen.  She will follow-up with me about 2 days after the procedure.  In the meantime, she should continue using Tylenol for pain.  I will report her findings to Dr. Sampson       RTC in as needed  Discharge Instructions were provided as documented in AVS summary.  The patient was in agreement with the assessment and plan.  All questions were answered.  There were no barriers to learning.         1. Mechanical low back pain    2. Lumbar spondylosis    3. Bulge of lumbar disc without myelopathy    4. Lumbar foraminal stenosis    5. Myalgia    6. Lumbar radiculopathy    7. Degeneration of intervertebral disc of lumbosacral region with discogenic back pain and lower extremity pain    8. Biomechanical lesion    9. Facet syndrome, lumbar    10. Hyperlipidemia, unspecified hyperlipidemia type    11. Essential hypertension    12. Lumbar disc herniation with radiculopathy        Alex B. Behar MD  Physical Medicine and Rehabilitation/Sports Medicine  [facility]         [1]   Allergies  Allergen Reactions    Cortisone SWELLING     Patient reports swelling occur.

## 2025-01-13 NOTE — TELEPHONE ENCOUNTER
Initiated authorization for LEFT L5 SNRB. CPT/HCPCS 68206-WR dx:M54.16 with Medicaid    The office is currently closed.  I will re-attempt contact at a later time to submit.

## 2025-03-07 ENCOUNTER — TELEPHONE (OUTPATIENT)
Dept: SURGERY | Facility: CLINIC | Age: 55
End: 2025-03-07

## 2025-03-07 NOTE — TELEPHONE ENCOUNTER
Patient called to let Dr. Sampson know could not get Nerve root block injection  as schedule due to insurance issues. She wants to know what to do in the mean time? She is currently has neck pain,headaches.  currently taking prednisone prescribed by PCP, and Ibuprofen 800 mg.

## 2025-03-07 NOTE — TELEPHONE ENCOUNTER
Patient could not get nerve root injections with Dr. Behar as he is out of network. Patient needs to contact insurance to get list of in network providers to go to.     Do you want patient to proceed to get injections with an in network provider or would you like to see patient back for surgical planning? See patient message.     Patient last seen by Dr Sampson on 12/9/24, assessment and plan below.     ASSESSMENT:  Ms Miles’s recurrent left lower extremity radicular symptoms and imaging findings suggest that progressive foraminal stenosis at L5-S1, likely exacerbated by progressive degenerative changes, is causing recurrent nerve root compression. Given further decompression would require facetectomy for definitive treatment, which would destabilize the aforementioned spinal segment, a fusion procedure is indicated to maintain stability and preserve nerve root freedom. An L5-S1 transforaminal lumbar interbody fusion (TLIF) is under strong consideration, although an anterior lumbar interbody fusion (ALIF) approach may also be discussed depending on alignment, bony anatomy, and patient-specific factors. Preoperative imaging with a lumbar CT, flexion-extension lumbar X-rays, and scoliosis films will help delineate spinal alignment, instability, and feasibility of achieving adequate foraminal decompression and fusion.    Plan:  - Obtain CT scan of the lumbar spine, lumbar flexion-extension X-rays, and full-length scoliosis films for comprehensive pre-surgical planning.  - Upon review of the additional imaging, determine the optimal surgical approach (TLIF vs. ALIF) to achieve adequate decompression and stabilization of the L5-S1 segment.  - Continue conservative management measures, including analgesics and activity modification, until surgical plans are finalized.  - Schedule a follow-up appointment to review the imaging results and proceed with preoperative counseling, if indicated, for fusion surgery aimed at  alleviating the patient’s foraminal stenosis and radicular symptoms.

## 2025-03-12 NOTE — TELEPHONE ENCOUNTER
Message below noted.    Patient can be seen by NSGY to review films and discuss surgery in the meantime.    Routed to  to assist patient with scheduling.

## 2025-03-17 ENCOUNTER — OFFICE VISIT (OUTPATIENT)
Dept: SURGERY | Facility: CLINIC | Age: 55
End: 2025-03-17
Payer: MEDICAID

## 2025-03-17 VITALS — WEIGHT: 185 LBS | HEIGHT: 61 IN | BODY MASS INDEX: 34.93 KG/M2

## 2025-03-17 DIAGNOSIS — M54.16 LUMBAR RADICULOPATHY: ICD-10-CM

## 2025-03-17 DIAGNOSIS — R20.2 BILATERAL LEG PARESTHESIA: ICD-10-CM

## 2025-03-17 DIAGNOSIS — M54.59 LUMBAR TRIGGER POINT SYNDROME: ICD-10-CM

## 2025-03-17 DIAGNOSIS — M47.816 FACET SYNDROME, LUMBAR: ICD-10-CM

## 2025-03-17 DIAGNOSIS — M51.369 BULGE OF LUMBAR DISC WITHOUT MYELOPATHY: ICD-10-CM

## 2025-03-17 DIAGNOSIS — M54.59 MECHANICAL LOW BACK PAIN: ICD-10-CM

## 2025-03-17 DIAGNOSIS — M48.061 LUMBAR FORAMINAL STENOSIS: ICD-10-CM

## 2025-03-17 DIAGNOSIS — M51.16 LUMBAR DISC HERNIATION WITH RADICULOPATHY: ICD-10-CM

## 2025-03-17 DIAGNOSIS — M79.89 LEG SWELLING: ICD-10-CM

## 2025-03-17 DIAGNOSIS — M47.816 LUMBAR SPONDYLOSIS: ICD-10-CM

## 2025-03-17 DIAGNOSIS — M54.16 LUMBAR RADICULITIS: ICD-10-CM

## 2025-03-17 DIAGNOSIS — Z98.890 S/P LUMBAR SPINE OPERATION: Primary | ICD-10-CM

## 2025-03-17 RX ORDER — SUMATRIPTAN SUCCINATE 25 MG/1
TABLET ORAL
COMMUNITY
Start: 2025-02-19

## 2025-03-17 RX ORDER — IBUPROFEN 400 MG/1
TABLET, FILM COATED ORAL
COMMUNITY
Start: 2025-02-13

## 2025-03-17 RX ORDER — CYANOCOBALAMIN 1000 UG/ML
INJECTION, SOLUTION INTRAMUSCULAR; SUBCUTANEOUS
COMMUNITY
Start: 2025-03-14

## 2025-03-17 RX ORDER — NEOMYCIN SULFATE, POLYMYXIN B SULFATE, AND DEXAMETHASONE 3.5; 10000; 1 MG/G; [USP'U]/G; MG/G
OINTMENT OPHTHALMIC
COMMUNITY
Start: 2025-02-13

## 2025-03-17 RX ORDER — DULOXETIN HYDROCHLORIDE 20 MG/1
CAPSULE, DELAYED RELEASE ORAL
COMMUNITY
Start: 2025-03-14

## 2025-03-17 RX ORDER — SODIUM CHLORIDE 0.65 %
AEROSOL, SPRAY (ML) NASAL
COMMUNITY
Start: 2025-02-13

## 2025-03-17 RX ORDER — PREDNISONE 20 MG/1
TABLET ORAL
COMMUNITY
Start: 2025-02-19

## 2025-03-17 NOTE — PROGRESS NOTES
The following individual(s) verbally consented to be recorded using ambient AI listening technology and understand that they can each withdraw their consent to this listening technology at any point by asking the clinician to turn off or pause the recording:    Patient name: Beryl Miles  Additional names:  Dimitri Miles      Established patient presents to clinic to review imaging results and she is here to discuss recommended L5-S1 transforaminal lumbar interbody fusion (TLIF)  surgery, although an anterior lumbar interbody fusion (ALIF) approach  may be considered. She denies pain at the moment.

## 2025-03-17 NOTE — PROGRESS NOTES
[facility]  Neurological Surgery Established Patient Clinic Note    Beryl Miles  9/17/1970  [id number]  PCP: Gissell Molina  Referring Provider: Alex Behar, MD     REASON FOR VISIT:  Low back pain with radiation     NEUROSURGICAL PROCEDURES TO DATE:  4/2/2024 - Left MIS L5-S1 laminoforaminotomy, partial medial facetectomy     HISTORY OF PRESENT ILLNESS 1/8/2024:  Beryl Miles is a(n) 54 year old female  with hyperlipidemia, GERD who is referred for evaluation of low back pain with radiation.  She reports atraumatic onset of low back pain radiating to the left leg approximately 3 years ago.  She has been dealing with this conservatively.  She describes her pain as intermittently radiating down the left lateral aspect of her thigh, calf, and lateral foot.  Any movement seems to exacerbate this.  Leaning forward certainly exacerbates this as well.  Approximately 1 year ago she had a left interlaminar epidural steroid injection by Dr. Behar which gave her great relief.  Most recently she had a right interlaminal epidural steroid injection that provided her with no relief.  She has had physical therapy in the past, with continuing pain.  She denies any balance difficulties, loss of dexterity, significant bilateral lower extremity weakness, or any red flags for myelopathy or cauda equina syndrome.      INTERVAL HISTORY 2/1/2024:  Beryl Miles returns to clinic today for continued spinal follow-up.  She was last seen on 1/8/2024.  She reports stable, persistent left-sided radiating pain.  It continues to affect her posterior thigh, calf, and plantar aspect of her foot.  She has associated numbness and tingling of her foot.  This is exacerbated by standing, and activity.  She had flexion-extension x-rays as well as an MRI of the lumbar spine with sedation. She is here to review those results.     INTERVAL HISTORY 3/21/2024:  Beryl Miles returns  to clinic today for preoperative discussions.  She continues to endorse left-sided radicular pain.  No new symptoms or red flags.  She is having her preoperative labs drawn today.     INTERVAL HISTORY 4/14/2024 (from Aron Herrera PA-C):  Beryl Miles is a pleasant 53 year old female who returns to the neurosurgery clinic today approximately 2 weeks s/p left minimally invasive L5-S1 laminoforaminotomy and partial medial facetectomy by Dr. Sampson on 4/2/2024.  The patient reports that she experienced some discomfort immediately postop, but has continued to improve.  Today, she denies any pain.  She notes complete resolution of her preoperative lower extremity symptoms.  No new neurologic complaints.  She is getting around well.  Denies any constitutional symptoms or symptoms concerning for sepsis.  She has not had any issues with her incision site.  Her  is with her today.      INTERVAL HISTORY 5/20/2024 (from Aron Herrera PA-C):  Beryl Miles is a pleasant 53 year old female who returns to the neurosurgery clinic today approximately 4 weeks s/p left minimally invasive L5-S1 laminoforaminotomy and medial facetectomy by Dr. Sampson on 4/2/2024.  Overall, the patient is doing well.  She will experience intermittent left buttock and calf pain, similar to the distribution of her symptoms prior to surgery.  This pain, however, is less in severity than it was preop.  She takes Tylenol with good relief.  She has no new neurologic complaints.  No constitutional symptoms or symptoms concerning for sepsis.  She states that her incision is healing well.  She has been more active in her daily life.     INTERVAL HISTORY 6/27/2024:  Beryl Miles returns to clinic today for 12-week postop follow-up.  Radiculopathies much improved with the exception of 1 she overdoes activity, she has some lingering pains and paresthesias.  This is not comparable to her preop pain.  However, as of late she is  having a myriad of other medical issues.  She reports swelling of the bilateral lower extremities, left greater than right starting around April.  She normally had a history of surgery but also to travel to [place] where she was on a plane for about 4 hours.  She was evaluated by her primary who prescribed her diuretics for fluid overload.  She is now experiencing paresthesias in her lower extremities.    INTERVAL HISTORY 10/3/2024:  Beryl Miles returns to clinic today for continued postoperative follow up. Her radiculopathies remain resolved. However, she continues to have some lower extremity swelling and paresthesias associated with these. She had negative DVTs in 6/27/2024    INTERVAL HISTORY 10/31/2024:  Beryl Miles returns to clinic today early for continued postoperative follow-up as she has continued to have pain involving her left lower back, buttock, posterolateral thigh, and calf.  She is exquisitely tender to palpation her musculature.  She also describes a deep aching pain radiating to her left groin.    INTERVAL HISTORY 12/9/2024:  Since the patient’s last postoperative visit, her initially improved radicular symptoms following the left L5-S1 laminoforaminotomy have gradually recurred. She reports persistent left-sided lower back and buttock pain radiating into the thigh and calf, consistent with her preoperative distribution. She also has focal left hip and buttock pain, which is new for her. Standing, walking, all exacerbate the pain. Recent imaging (MRI of the lumbar spine) demonstrates progressive degenerative changes at L5-S1 with notable left foraminal narrowing, correlating with her recurrent symptoms. A negative left hip MRI has ruled out a hip joint etiology. Currently, her pain is persistent enough to warrant consideration of additional surgical intervention focused on restoring foraminal height and nerve decompression.    INTERVAL HISTORY 1/8/2025:  Sasha James  Ginger returns today reporting persistent and progressively worsening low back pain, particularly with transitions from sitting to standing. She also notes left lower extremity (LE) discomfort and intermittent swelling bilaterally, more pronounced on the left. She has a history of a left minimally invasive L5-S1 laminoforaminotomy and partial medial facetectomy on April 2, 2024, which initially provided symptom relief but now the pain has recurred. Current imaging (including CT, flexion-extension X-rays, and scoliosis films) demonstrates signs of left-sided facet degenerative changes at L5-S1 and foraminal narrowing with apparent joint instability, suggesting that further decompression without fusion would risk significant destabilization.    INTERVAL HISTORY 3/17/2025:  Beryl Miles returns today approximately 11 months after her left MIS L5-S1 laminoforaminotomy and partial medial facetectomy (performed on 4/2/2024). Since her last visit, she reports intermittent episodes of left-sided low back and buttock pain radiating into her thigh and calf, similar to her preoperative distribution but somewhat less intense than the original pain. She has used intermittent oral steroids prescribed by her primary provider, which temporarily alleviated symptoms. However, the pain continues to recur, especially after prolonged standing and walking. She also describes muscular tension around her left hip and buttock region. No new red flag symptoms are reported. She remains interested in definitive options for persistent foraminal narrowing at L5-S1, although she is contemplating the timing of possible surgical intervention around upcoming travel in the fall.    PAST MEDICAL HISTORY:  Past Medical History:    Back problem    Esophageal reflux    High cholesterol    Lumbar spondylosis    Migraines    PONV (postoperative nausea and vomiting)    Prediabetes    Screen for colon cancer    repeat CLN in 10 years     PAST  SURGICAL HISTORY:  Past Surgical History:   Procedure Laterality Date    Colonoscopy screening - referral N/A 7/5/2022    Procedure: COLONOSCOPY-SCREENING;  Surgeon: CB Gann MD;  Location: [facility] ENDOSCOPY    Hysterectomy  2017     FAMILY HISTORY:  family history includes No Known Problems in her father and mother.    SOCIAL HISTORY:   reports that she has never smoked. She has never used smokeless tobacco. She reports that she does not drink alcohol and does not use drugs.    ALLERGIES:  Allergies   Allergen Reactions    Cortisone SWELLING     Patient reports swelling occur.     MEDICATIONS:  Current Outpatient Medications on File Prior to Visit   Medication Sig Dispense Refill    baclofen 10 MG Oral Tab       ALPRAZolam 0.25 MG Oral Tab       diclofenac 75 MG Oral Tab EC       ergocalciferol 1.25 MG (47198 UT) Oral Cap once a week.      furosemide 20 MG Oral Tab       furosemide 40 MG Oral Tab       gabapentin 300 MG Oral Cap  (Patient not taking: Reported on 10/31/2024)      levothyroxine 50 MCG Oral Tab       metFORMIN 500 MG Oral Tab Take 1 tablet (500 mg total) by mouth daily with breakfast.      ofloxacin 0.3 % Otic Solution       Potassium Chloride ER 20 MEQ Oral Tab CR       azelastine 0.1 % Nasal Solution 1 spray by Nasal route 2 (two) times daily. 1 each 1    methylPREDNISolone (MEDROL) 4 MG Oral Tablet Therapy Pack Dosepack: take as directed (Patient not taking: Reported on 1/8/2025) 1 each 0    acetaminophen 500 MG Oral Tab Take 1 tablet (500 mg total) by mouth every 4 (four) hours as needed. 120 tablet 0    oxyCODONE 5 MG Oral Tab Take 1 tablet (5 mg total) by mouth every 4 (four) hours as needed. (Patient not taking: Reported on 10/31/2024) 30 tablet 0    methocarbamol 500 MG Oral Tab Take 1 tablet (500 mg total) by mouth 3 (three) times daily as needed. (Patient not taking: Reported on 1/8/2025) 60 tablet 0    Naloxone HCl 4 MG/0.1ML Nasal Liquid 4 mg by Nasal route as needed. If patient  remains unresponsive, repeat dose in other nostril 2-5 minutes after first dose. (Patient not taking: Reported on 10/3/2024) 1 kit 0    omeprazole 20 MG Oral Capsule Delayed Release Take 1 capsule (20 mg total) by mouth daily. (Patient taking differently: Take 2 capsules (40 mg total) by mouth daily.) 90 capsule 1    rosuvastatin (CRESTOR) 40 MG Oral Tab Take 1 tablet (40 mg total) by mouth nightly. (Patient taking differently: Take 0.5 tablets (20 mg total) by mouth nightly.) 90 tablet 1     No current facility-administered medications on file prior to visit.     REVIEW OF SYSTEMS:  All other systems were reviewed and were negative except for those previously mentioned in the HPI    PHYSICAL EXAMINATION:  General: No acute distress.  Respiratory: Non-labored respirations bilaterally. No audible wheezing  Cardiovascular: Extremities warm and well-perfused.  Abdomen: Soft, nontender, nondistended.   Musculoskeletal: Moves all extremities well, symmetrically.  Extremities: No edema.     NEUROLOGIC EXAMINATION:  Mental status: Alert and oriented x 3  Speech: Clear, fluent  Cranial nerves: PERRLA, EOMI, face symmetric, with normal strength and sensation, tongue and palate midline, SCM 5/5 bilaterally  Motor:                           RIGHT  Delt 5/5   Bic 5/5  Tri 5/5   HI 5/5    5/5  IP 5/5   Quad 5/5   Ham 5/5   AT 5/5   EHL 5/5 Sterling 5/5                          LEFT    Delt 5/5   Bic 5/5  Tri 5/5   HI 5/5    5/5  IP 5/5   Quad 5/5   Ham 5/5   AT 5/5   EHL 5/5 Sterling 5/5            No pronator drift  Tone: Normal  Atrophy/Fasciculations: None  Sensation: Normal to light touch, symmetric, no neglect  Cerebellar: Normal finger nose finger  Gait: Normal, nondistressed heel toe tandem gait      Reflexes: 2+ throughout, symmetric, no Lily's    IMAGING:  MRI lumbar with and without 12/5/2024: Status post left MIS L5-S1 laminoforaminotomy.  No central stenosis.  However, his previous there has been progression of  spondylotic disease at L5-S1 with no left L5-S1 foraminal collapse and compression of the exiting L5 nerve root.    XR lumbar flexion-extension 12/9/2024: Degenerative changes throughout the lumbar spine particularly L5-S1 with no evidence of dynamic instabilities.    XR scoliosis 12/9/2024: No significant hip or leg length discrepancy identified.  Mild levoscoliosis with an apex around L4. On sagittal views there is a slightly positive SVA of about 5 cm.  However, no significant PI to lumbar lordosis mismatch.  Sacral slope is 38 degrees.    CT lumbar 12/13/2024: Good bone density per Hounsfield units.  There is progressive degenerative disease affecting the left L5-S1 facet with apparent widening compared to the right. This suggests unilateral facet instability. No fractures identified.        No new imaging has been performed since December of 2024. I reviewed the prior studies with the patient.    ASSESSMENT:  Ms. Miles has recurrent radicular symptoms predominantly in the left lower extremity, correlating with known foraminal collapse and facet arthropathy at L5-S1. She initially experienced good relief after her April 2024 decompression; however, progressive foraminal narrowing and facet degenerative changes appear to be causing repeat nerve root irritation. Because a redo decompression alone would require extensive joint resection and risk further destabilization, a fusion procedure remains the most definitive option to stabilize the segment and relieve nerve compression. She is considering her surgical timing, factoring in upcoming travel plans and her desire to optimize muscle conditioning before any further procedure.    PLAN:  - Recommended an eventual left L5-S1 transforaminal lumbar interbody fusion (TLIF), given recurrent foraminal stenosis and progressive facet changes.  - Continue conservative management (oral analgesics or brief steroid taper as needed) until she decides on surgical timing;  encouraged low-impact exercise and core strengthening.  - Discussed with the patient that surgery could be scheduled before or after her planned travel, taking into account the typical three- to six-month postoperative recovery before unrestricted activity.  - Advised weight optimization and ongoing physical activity to preserve muscle mass and facilitate postoperative rehabilitation.    Braulio Sampson MD  Neurological Surgery    [facility]  [address], [address]  [place], [place] [id number] [phone]  Pager [phone]  3/17/2025      This note was created using a voice-recognition transcribing system. Incorrect words or phrases may have been missed during proofreading. Please interpret accordingly.    Total Time    Established Patient Total Time       30  minutes.      Activities       Preparing to see the patient (chart/tests/imaging review).       Obtaining and/or reviewing separately obtained history.       Performing a medically appropriate examination and/or evaluation.       Counseling and educating the patient/family/caregiver.       Ordering medications, tests, or procedures.       Referring and communicating with other health care professionals (when not separately reported).       Documenting clincal information in the electronic or other health record.       Independently interpreting results (not separately reported).    Communicating results to the patient/family/caregiver.    Care coordination (not separately reported).

## 2025-03-17 NOTE — PATIENT INSTRUCTIONS
Refill policies:    Allow 2-3 business days for refills; controlled substances may take longer.  Contact your pharmacy at least 5 days prior to running out of medication and have them send an electronic request or submit request through the “request refill” option in your [employer] account.  Refills are not addressed on weekends; covering physicians do not authorize routine medications on weekends.  No narcotics or controlled substances are refilled after noon on Fridays or by on call physicians.  By law, narcotics must be electronically prescribed.  A 30 day supply with no refills is the maximum allowed.  If your prescription is due for a refill, you may be due for a follow up appointment.  To best provide you care, patients receiving routine medications need to be seen at least once a year.  Patients receiving narcotic/controlled substance medications need to be seen at least once every 3 months.  In the event that your preferred pharmacy does not have the requested medication in stock (e.g. Backordered), it is your responsibility to find another pharmacy that has the requested medication available.  We will gladly send a new prescription to that pharmacy at your request.    Scheduling Tests:    If your physician has ordered radiology tests such as MRI or CT scans, please contact Central Scheduling at [phone] right away to schedule the test.  Once scheduled, the [facility] Centralized Referral Team will work with your insurance carrier to obtain pre-certification or prior authorization.  Depending on your insurance carrier, approval may take 3-10 days.  It is highly recommended patients assure they have received an authorization before having a test performed.  If test is done without insurance authorization, patient may be responsible for the entire amount billed.      Precertification and Prior Authorizations:  If your physician has recommended that you have a procedure or additional testing performed the [facility]  Centralized Referral Team will contact your insurance carrier to obtain pre-certification or prior authorization.    You are strongly encouraged to contact your insurance carrier to verify that your procedure/test has been approved and is a COVERED benefit.  Although the [facility] Centralized Referral Team does its due diligence, the insurance carrier gives the disclaimer that \"Although the procedure is authorized, this does not guarantee payment.\"    Ultimately the patient is responsible for payment.   Thank you for your understanding in this matter.  Paperwork Completion:  If you require FMLA or disability paperwork for your recovery, please make sure to either drop it off or have it faxed to our office at [phone]. Be sure the form has your name and date of birth on it.  The form will be faxed to our Forms Department and they will complete it for you.  There is a 25$ fee for all forms that need to be filled out.  Please be aware there is a 10-14 day turnaround time.  You will need to sign a release of information (JANINE) form if your paperwork does not come with one.  You may call the Forms Department with any questions at [phone].  Their fax number is [phone].

## 2025-08-18 ENCOUNTER — TELEPHONE (OUTPATIENT)
Dept: NEUROLOGY | Facility: CLINIC | Age: 55
End: 2025-08-18

## 2025-08-18 ENCOUNTER — HOSPITAL ENCOUNTER (OUTPATIENT)
Dept: GENERAL RADIOLOGY | Facility: HOSPITAL | Age: 55
Discharge: HOME | End: 2025-08-18
Attending: STUDENT IN AN ORGANIZED HEALTH CARE EDUCATION/TRAINING PROGRAM

## 2025-08-18 ENCOUNTER — OFFICE VISIT (OUTPATIENT)
Dept: SURGERY | Facility: CLINIC | Age: 55
End: 2025-08-18

## 2025-08-18 VITALS
OXYGEN SATURATION: 98 % | WEIGHT: 185 LBS | SYSTOLIC BLOOD PRESSURE: 119 MMHG | HEIGHT: 61 IN | HEART RATE: 89 BPM | BODY MASS INDEX: 34.93 KG/M2 | DIASTOLIC BLOOD PRESSURE: 72 MMHG

## 2025-08-18 DIAGNOSIS — M54.59 LUMBAR TRIGGER POINT SYNDROME: ICD-10-CM

## 2025-08-18 DIAGNOSIS — R20.2 BILATERAL LEG PARESTHESIA: ICD-10-CM

## 2025-08-18 DIAGNOSIS — M51.16 LUMBAR DISC HERNIATION WITH RADICULOPATHY: ICD-10-CM

## 2025-08-18 DIAGNOSIS — M54.16 LUMBAR RADICULITIS: ICD-10-CM

## 2025-08-18 DIAGNOSIS — M79.89 LEG SWELLING: ICD-10-CM

## 2025-08-18 DIAGNOSIS — M54.59 MECHANICAL LOW BACK PAIN: ICD-10-CM

## 2025-08-18 DIAGNOSIS — M47.816 FACET SYNDROME, LUMBAR: ICD-10-CM

## 2025-08-18 DIAGNOSIS — M47.816 LUMBAR SPONDYLOSIS: ICD-10-CM

## 2025-08-18 DIAGNOSIS — M53.3 SACROILIAC PAIN: ICD-10-CM

## 2025-08-18 DIAGNOSIS — M54.9 MUSCULOSKELETAL BACK PAIN: ICD-10-CM

## 2025-08-18 DIAGNOSIS — M51.369 BULGE OF LUMBAR DISC WITHOUT MYELOPATHY: ICD-10-CM

## 2025-08-18 DIAGNOSIS — M54.16 LUMBAR RADICULOPATHY: ICD-10-CM

## 2025-08-18 DIAGNOSIS — Z98.890 S/P LUMBAR SPINE OPERATION: ICD-10-CM

## 2025-08-18 DIAGNOSIS — M21.70 LEG LENGTH DISCREPANCY: Primary | ICD-10-CM

## 2025-08-18 DIAGNOSIS — M48.061 LUMBAR FORAMINAL STENOSIS: ICD-10-CM

## 2025-08-18 DIAGNOSIS — M76.32 IT BAND SYNDROME, LEFT: ICD-10-CM

## 2025-08-18 DIAGNOSIS — M95.5 PELVIC OBLIQUITY: ICD-10-CM

## 2025-08-18 DIAGNOSIS — Z98.890 S/P LUMBAR SPINE OPERATION: Primary | ICD-10-CM

## 2025-08-18 PROCEDURE — 77073 BONE LENGTH STUDIES: CPT | Performed by: STUDENT IN AN ORGANIZED HEALTH CARE EDUCATION/TRAINING PROGRAM

## 2025-08-18 PROCEDURE — 72082 X-RAY EXAM ENTIRE SPI 2/3 VW: CPT | Performed by: STUDENT IN AN ORGANIZED HEALTH CARE EDUCATION/TRAINING PROGRAM

## 2025-08-18 RX ORDER — SUCRALFATE ORAL 1 G/10ML
1 SUSPENSION ORAL
COMMUNITY
Start: 2025-04-17

## 2025-08-18 RX ORDER — IBUPROFEN 800 MG/1
TABLET, FILM COATED ORAL
COMMUNITY
Start: 2025-07-07

## 2025-08-18 RX ORDER — DOCUSATE SODIUM 100 MG/1
CAPSULE, LIQUID FILLED ORAL
COMMUNITY
Start: 2025-06-23

## 2025-08-18 RX ORDER — RABEPRAZOLE SODIUM 20 MG/1
20 TABLET, DELAYED RELEASE ORAL DAILY
COMMUNITY
Start: 2025-05-09

## (undated) DIAGNOSIS — Z12.11 COLON CANCER SCREENING: Primary | ICD-10-CM

## (undated) DEVICE — TUBING MEGADYNE SPECULUM

## (undated) DEVICE — SHEET,DRAPE,53X77,STERILE: Brand: MEDLINE

## (undated) DEVICE — KIT CLEAN ENDOKIT 1.1OZ GOWNX2

## (undated) DEVICE — GLOVE SUR 8 SENSICARE PI MIC PIP CRM PWD F

## (undated) DEVICE — KIT HEMSTAT MTRX 8ML PORCINE GEL HUM THROM

## (undated) DEVICE — INTENDED USE FOR SURGICAL MARKING ON INTACT SKIN, ALSO PROVIDES A PERMANENT METHOD OF IDENTIFYING OBJECTS IN THE OPERATING ROOM: Brand: WRITESITE® PLUS MINI PREP RESISTANT MARKER

## (undated) DEVICE — LINE MNTR ADLT SET O2 INTMD

## (undated) DEVICE — E-Z CLEAN, NON-STICK, PTFE COATED, ELECTROSURGICAL BLADE ELECTRODE, MODIFIED EXTENDED INSULATION, 6.5 INCH (16.5 CM): Brand: MEGADYNE

## (undated) DEVICE — GLOVE SUR 7.5 SENSICARE PI MIC PIP CRM PWD F

## (undated) DEVICE — SUT ETHLN 2-0 18IN FS NABSRB BLK 26MM 3/8 CIR

## (undated) DEVICE — GLOVE SUR 8 SENSICARE NEOPR PWD F

## (undated) DEVICE — PACK CDS LAMINECTOMY

## (undated) DEVICE — SPK10329 JACKSON KIT: Brand: SPK10329 JACKSON KIT

## (undated) DEVICE — FRAZIER SUCTION INSTRUMENT 12 FR W/CONTROL VENT & OBTURATOR: Brand: FRAZIER

## (undated) DEVICE — MEDI-VAC NON-CONDUCTIVE SUCTION TUBING 6MM X 1.8M (6FT.) L: Brand: CARDINAL HEALTH

## (undated) DEVICE — ELECTRODE ES 2.75IN PTFE BLDE MOD E-Z CLN

## (undated) DEVICE — PENCIL ES BTTN SWCH W/ TIP HOLSTER E-Z CLN

## (undated) DEVICE — PROXIMATE SKIN STAPLERS (35 WIDE) CONTAINS 35 STAINLESS STEEL STAPLES (FIXED HEAD): Brand: PROXIMATE

## (undated) DEVICE — SPONGE GZ 4XL4IN 100% COT 12 PLY TYP VII WVN

## (undated) DEVICE — PACK DRP UNIV W/ BK TBL MAYO STD BTM TOP SIDE

## (undated) DEVICE — VIOLET BRAIDED (POLYGLACTIN 910), SYNTHETIC ABSORBABLE SUTURE: Brand: COATED VICRYL

## (undated) DEVICE — C-ARMOR C-ARM EQUIPMENT COVERS CLEAR STERILE UNIVERSAL FIT 12 PER CASE: Brand: C-ARMOR

## (undated) DEVICE — KIT ENDO ORCAPOD 160/180/190

## (undated) DEVICE — MASK PROC W/VISOR ANTIGLARE

## (undated) NOTE — LETTER
AUTHORIZATION FOR SURGICAL OPERATION OR OTHER PROCEDURE    1. I hereby authorize Dr. Francisco Javier Mccarty and the [facility] staff assigned to my case to perform the following operation and/or procedure at the [facility]:    RIGHT medial epicondyle CSI under ultrasound guidance    2. My physician has explained the nature and purpose of the operation or other procedure, possible alternative methods of treatment, the risks involved, and the possibility of complication to me. I acknowledge that no guarantee has been made as to the result that may be obtained. 3.  I recognize that, during the course of this operation, or other procedure, unforseen conditions may necessitate additional or different procedure than those listed above. I, therefore, further authorize and request that the above named physician, his/her physician assistants or designees perform such procedures as are, in his/her professional opinion, necessary and desirable. 4.  Any tissue or organs removed in the operation or other procedure may be disposed of by and at the discretion of the [facility] staff and [facility]. 5.  I understand that in the event of a medical emergency, I will be transported by local paramedics to [facility] or other [facility] emergency department. 6.  I certify that I have read and fully understand the above consent to operation and/or other procedure. 7.  I acknowledge that my physician has explained sedation/analgesia administration to me including the risks and benefits. I consent to the administration of sedation/analgesia as may be necessary or desirable in the judgement of my physician. Witness signature: ___________________________________________________ Date:  ______/______/_____                    Time:  ________ A. M.  P.M.        Patient Name:  Ruma Bhatia  9/17/1970  [id number]       Patient signature: ___________________________________________________                 Statement of Physician  My signature below affirms that prior to the time of the procedure, I have explained to the patient and/or his/her guardian, the risks and benefits involved in the proposed treatment and any reasonable alternative to the proposed treatment. I have also explained the risks and benefits involved in the refusal of the proposed treatment and have answered the patient's questions.                         Date:  ______/______/_______  Provider                      Signature:  __________________________________________________________       Time:  ___________ AFAROOQ WOODARD

## (undated) NOTE — LETTER
AUTHORIZATION FOR SURGICAL OPERATION OR OTHER PROCEDURE    1. I hereby authorize Dr. Alli Erickson and the [facility] staff assigned to my case to perform the following operation and/or procedure at the [facility]:    LEFT medical epicondyle and common flexor tendon PRP under ultrasound guidance CPT 0232T     2. My physician has explained the nature and purpose of the operation or other procedure, possible alternative methods of treatment, the risks involved, and the possibility of complication to me. I acknowledge that no guarantee has been made as to the result that may be obtained. 3.  I recognize that, during the course of this operation, or other procedure, unforseen conditions may necessitate additional or different procedure than those listed above. I, therefore, further authorize and request that the above named physician, his/her physician assistants or designees perform such procedures as are, in his/her professional opinion, necessary and desirable. 4.  Any tissue or organs removed in the operation or other procedure may be disposed of by and at the discretion of the [facility] staff and [facility] AT [facility]. 5.  I understand that in the event of a medical emergency, I will be transported by local paramedics to [facility] or other hospital emergency department. 6.  I certify that I have read and fully understand the above consent to operation and/or other procedure. 7.  I acknowledge that my physician has explained sedation/analgesia administration to me including the risks and benefits. I consent to the administration of sedation/analgesia as may be necessary or desirable in the judgement of my physician. Witness signature: ___________________________________________________ Date:  ______/______/_____                    Time:  ________ A. M.  P.M.        Patient Name:  Camila Bergeron  9/17/1970  [id number]         Patient signature: ___________________________________________________                 Statement of Physician  My signature below affirms that prior to the time of the procedure, I have explained to the patient and/or his/her guardian, the risks and benefits involved in the proposed treatment and any reasonable alternative to the proposed treatment. I have also explained the risks and benefits involved in the refusal of the proposed treatment and have answered the patient's questions.                         Date:  ______/______/_______  Provider                      Signature:  __________________________________________________________       Time:  ___________ AFAROOQ WOODARD

## (undated) NOTE — LETTER
AUTHORIZATION FOR SURGICAL OPERATION OR OTHER PROCEDURE    1.  I hereby authorize Dr. Love Davies and the [facility] staff assigned to my case to perform the following operation and/or procedure at the [facility]:     LEFT medial epicondyle CSI under ultrasound g ___________________________________________________               Statement of Physician  My signature below affirms that prior to the time of the procedure, I have explained to the patient and/or his/her guardian, the risks and benefits involved in the pr

## (undated) NOTE — LETTER
[address], [place]  Authorization for Invasive Procedures  1. I hereby authorize Dr. Bhavesh Cottrell , my physician and whomever may be designated as the doctor's assistant, to perform the following operation and/or procedure:  Colonoscopy on Loulou Carcamo at [facility].    2. My physician has explained to me the nature and purpose of the operation or other procedure, possible alternative methods of treatment, the risks involved and the possibility of complications to me. I understand the probable consequences of declining the recommended procedure and the alternative methods of treatment. I acknowledge that no guarantee has been made as to the result that may be obtained. 3. I recognize that during the course of this operation or other procedure, unforeseen conditions may necessitate additional or different procedures than those listed above. I, therefore, further authorize and request that the above-named physician, his/her physician assistants, or designees perform such procedures as are, in his/her professional opinion, necessary and desirable. If I have a Do Not Attempt Resuscitation (DNAR) order in place, that status will be suspended while in the operating room, procedural suite, and during the recovery period unless otherwise explicitly stated by me (or a person authorized to consent on my behalf). The surgeon or my attending physician will determine when the applicable recovery period ends for purposes of reinstating the DNAR order. 4. Should the need arise during my operation or immediate post-operative period; I also consent to the administration of blood and/or blood products.  Further, I understand that despite careful testing and screening of blood and blood products, I may still be subject to ill effects as a result of recieving a blood transfusion an/or blood producst. The following are some, but not all, of the potential risks that can occur: fever and allergic reactions, hemolytic reactions, transmission of disease such as hepatitis, AIDS, cytomegalovirus (CMV), and flluid overload. In the event that I wish to have autologous transfusions of my own blood, or a directed donor transfusion, I will discuss this with my physician. 5. I consent to the photographing of the operations or procedures to be performed for the purposes of advancing medicine, science, and/or education, provided my identity is not revealed. If the procedure has been videotaped, the physician/surgeon will obtain the original videotape. The hospital will not be responsible for storage or maintenance of this tape. 6. I consent to the presence of a  or observer as deemed necessary by my physician or his designee. 7. Any tissues or organs removed in the operation or other procedure may be disposed of by and at the discretion of [facility].    8. I understand that the physician and his/her physician assistants may not be employees or agents of [facility], [facility], [facility], but are independent medical practitioners who have been permitted to use its facilities for the care and treatment of their patients. 9. Patients having a sterilization procedure: I understand that if the procedure is successful the results will be permanent and it will therefore be impossible for me to inseminate, conceive or bear children. I also understand that the procedure is intended to result in sterility, although the result has not been guaranteed. 10. I CERTIFY THAT I HAVE READ AND FULLY UNDERSTAND THE ABOVE CONSENT TO OPERATION and/or OTHER PROCEDURE. 11. I acknowledge that my physician has explained sedation/analgesia administration to me including the risks and benefits. I consent to the administration of sedation/analgesia as may be necessary or desirable in the judgment of my physician.      Signature of Patient:  ________________________________________________ Date: _________Time: _________    Responsible person in case of minor or unconscious: _____________________________Relationship: ____________     Witness Signature: ____________________________________________ Date: __________ Time: ___________    Statement of Physician  My signature below affirms that prior to the time of the procedure, I have explained to the patient and/or her legal representative, the risks and benefits involved in the proposed treatment and any reasonable alternative to the proposed treatment. I have also explained the risks and benefits involved in the refusal of the proposed treatment and have answered the patient's questions. If I have a significant financial interest in this procedure/surgery, I have disclosed this and had a discussion with my patient.     Signature of Physician:   ________________________________________Date: _________Time:_______ Patient Name: Dany Ovalle  : 1970   Printed: 2022    Medical Record #: [id number]

## (undated) NOTE — LETTER
24  RE: Beryl Miles     : 1970    Dear Dr. Corona,    This letter is to inform you that your patient has been scheduled for surgery with Dr. Sampson on 24 at [facility]. Pre-operative clearance has been requested within 30 days of surgery.  We have asked the patient to contact your office to schedule a pre-operative visit.     Diagnosis: lumbar spondylosis, lumbar disc herniation with radiculopathy  Procedure: LEFT MINIMALLY INVASIVE LUMBAR 5 TO SACRAL 1 LAMINOFORAMINOTOMY, PARTIAL MEDIAL FACETECTOMY      A Pre-operative History & Physical is needed for medical clearance within 30 days of surgery. Please address patient's active problems and potential risks of having surgery considering their medical history.  Pre-op labs are scheduled through the [facility] department. If any labs/testing are being done through the PCP office, then results should be faxed to the pre-admission testing department at [facility] at [phone]. Our pre-operative lab orders are located in our surgery order, if the patient would like these done through your office, you will need to place separate orders.     Please fax clearance letter/office visit note to our office at fax #: [phone]. Your office note must clearly indicate if the patient is medically cleared for surgery or not.    The following orders will be placed by pre-admission testing:  CBC  CMP  Type and Screen   PT/PTT/INR  MRSA/MSSA Nasal Swab  (*And any other pertinent testing based on patient's current clinical condition.)    If you have any questions, you may contact our office at [phone], option # 2.    Thank you,  Yajaira ANGUIANO RN, BSN  Clinical Nurse Lead  [facility]

## (undated) NOTE — LETTER
Date: 2023      Patient Name: Ramos Hooker      : 1970        Thank you for choosing [facility] as your health care provider. Your physician has deemed the following medical service(s) necessary. However, your insurance plan may not pay for all of your health care and costs and may deny payment for this service. The fact that your insurance plan does not pay for an item or service does not mean you should not receive it. The purpose of this form is to help you make an informed decision about whether or not you want to receive this service(s) that may not be paid for by your insurance plan. CPT Code Description     Cost     LEFT medical epicondyle and common flexor tendon PRP under ultrasound guidance CPT 0232T   $705.00      I understand that the above mentioned service(s) or supply may not be covered by my insurance company.  I agree to be financially responsible for the cost of this service or supply in the event of my insurance denies payment as a non-covered benefit.        ______________________________________________________________________  Signature of Patient or Patient's Representative  Relationship  Date    ______________________________________________________________________  Signature of Witness to signing of form   Printed Name